# Patient Record
Sex: FEMALE | Race: WHITE | NOT HISPANIC OR LATINO | Employment: OTHER | URBAN - METROPOLITAN AREA
[De-identification: names, ages, dates, MRNs, and addresses within clinical notes are randomized per-mention and may not be internally consistent; named-entity substitution may affect disease eponyms.]

---

## 2017-06-01 ENCOUNTER — APPOINTMENT (OUTPATIENT)
Dept: ULTRASOUND IMAGING | Facility: HOSPITAL | Age: 67
End: 2017-06-01
Attending: INTERNAL MEDICINE

## 2017-06-05 ENCOUNTER — HOSPITAL ENCOUNTER (OUTPATIENT)
Dept: GENERAL RADIOLOGY | Facility: HOSPITAL | Age: 67
Discharge: HOME | End: 2017-06-05
Attending: ORTHOPAEDIC SURGERY | Admitting: ORTHOPAEDIC SURGERY

## 2017-06-05 DIAGNOSIS — M48.061 LUMBAR STENOSIS: ICD-10-CM

## 2017-06-05 PROCEDURE — 71020 HC CHEST PA AND LATERAL: CPT

## 2017-06-08 RX ORDER — CARVEDILOL 25 MG/1
25 TABLET ORAL
COMMUNITY

## 2017-06-08 RX ORDER — AMLODIPINE BESYLATE AND BENAZEPRIL HYDROCHLORIDE 10; 40 MG/1; MG/1
1 CAPSULE ORAL
COMMUNITY

## 2017-06-08 RX ORDER — HYDROCHLOROTHIAZIDE 25 MG/1
25 TABLET ORAL
COMMUNITY

## 2017-06-08 RX ORDER — HYDROCODONE BITARTRATE AND ACETAMINOPHEN 5; 325 MG/1; MG/1
1 TABLET ORAL
COMMUNITY
End: 2017-12-14 | Stop reason: SDUPTHER

## 2017-06-08 RX ORDER — GLIMEPIRIDE 2 MG/1
2 TABLET ORAL
COMMUNITY

## 2017-06-08 RX ORDER — GARLIC 400 MG
1 TABLET ORAL
COMMUNITY

## 2017-06-08 RX ORDER — SIMVASTATIN 20 MG
20 TABLET ORAL
COMMUNITY

## 2017-06-09 ENCOUNTER — APPOINTMENT (OUTPATIENT)
Dept: GENERAL RADIOLOGY | Facility: HOSPITAL | Age: 67
End: 2017-06-09

## 2017-06-09 ENCOUNTER — ANESTHESIA EVENT (OUTPATIENT)
Dept: PERIOP | Facility: HOSPITAL | Age: 67
End: 2017-06-09

## 2017-06-09 ENCOUNTER — HOSPITAL ENCOUNTER (OUTPATIENT)
Facility: HOSPITAL | Age: 67
Setting detail: HOSPITAL OUTPATIENT SURGERY
Discharge: HOME | End: 2017-06-09
Attending: ORTHOPAEDIC SURGERY | Admitting: ORTHOPAEDIC SURGERY

## 2017-06-09 ENCOUNTER — ANESTHESIA (OUTPATIENT)
Dept: PERIOP | Facility: HOSPITAL | Age: 67
End: 2017-06-09

## 2017-06-09 VITALS
TEMPERATURE: 98.1 F | OXYGEN SATURATION: 92 % | HEART RATE: 64 BPM | SYSTOLIC BLOOD PRESSURE: 129 MMHG | BODY MASS INDEX: 29.27 KG/M2 | RESPIRATION RATE: 16 BRPM | HEIGHT: 67 IN | DIASTOLIC BLOOD PRESSURE: 76 MMHG | WEIGHT: 186.5 LBS

## 2017-06-09 DIAGNOSIS — M71.38 SYNOVIAL CYST OF LUMBAR FACET JOINT: ICD-10-CM

## 2017-06-09 LAB
ABO GROUP BLD: NORMAL
BLD GP AB SCN SERPL QL: NEGATIVE
GLUCOSE BLDC GLUCOMTR-MCNC: 140 MG/DL (ref 70–130)
RH BLD: POSITIVE

## 2017-06-09 PROCEDURE — 25010000002 ONDANSETRON PER 1 MG: Performed by: ANESTHESIOLOGY

## 2017-06-09 PROCEDURE — 25010000002 MIDAZOLAM PER 1 MG: Performed by: ANESTHESIOLOGY

## 2017-06-09 PROCEDURE — 25010000003 CEFAZOLIN IN DEXTROSE 2-4 GM/100ML-% SOLUTION: Performed by: ORTHOPAEDIC SURGERY

## 2017-06-09 PROCEDURE — 25010000002 FENTANYL CITRATE (PF) 100 MCG/2ML SOLUTION: Performed by: NURSE ANESTHETIST, CERTIFIED REGISTERED

## 2017-06-09 PROCEDURE — 25010000002 HYDROMORPHONE PER 4 MG: Performed by: NURSE ANESTHETIST, CERTIFIED REGISTERED

## 2017-06-09 PROCEDURE — 82962 GLUCOSE BLOOD TEST: CPT

## 2017-06-09 PROCEDURE — 88304 TISSUE EXAM BY PATHOLOGIST: CPT | Performed by: ORTHOPAEDIC SURGERY

## 2017-06-09 PROCEDURE — 25010000002 DEXAMETHASONE PER 1 MG: Performed by: NURSE ANESTHETIST, CERTIFIED REGISTERED

## 2017-06-09 PROCEDURE — 25010000002 FENTANYL CITRATE (PF) 100 MCG/2ML SOLUTION: Performed by: ANESTHESIOLOGY

## 2017-06-09 PROCEDURE — 25010000002 NEOSTIGMINE PER 0.5 MG: Performed by: NURSE ANESTHETIST, CERTIFIED REGISTERED

## 2017-06-09 PROCEDURE — 86901 BLOOD TYPING SEROLOGIC RH(D): CPT | Performed by: ORTHOPAEDIC SURGERY

## 2017-06-09 PROCEDURE — 25010000002 PROPOFOL 10 MG/ML EMULSION: Performed by: NURSE ANESTHETIST, CERTIFIED REGISTERED

## 2017-06-09 PROCEDURE — 25010000002 ONDANSETRON PER 1 MG: Performed by: NURSE ANESTHETIST, CERTIFIED REGISTERED

## 2017-06-09 PROCEDURE — 86900 BLOOD TYPING SEROLOGIC ABO: CPT | Performed by: ORTHOPAEDIC SURGERY

## 2017-06-09 PROCEDURE — 76000 FLUOROSCOPY <1 HR PHYS/QHP: CPT

## 2017-06-09 PROCEDURE — 86850 RBC ANTIBODY SCREEN: CPT | Performed by: ORTHOPAEDIC SURGERY

## 2017-06-09 PROCEDURE — 72020 X-RAY EXAM OF SPINE 1 VIEW: CPT

## 2017-06-09 RX ORDER — OXYCODONE AND ACETAMINOPHEN 7.5; 325 MG/1; MG/1
1 TABLET ORAL ONCE AS NEEDED
Status: DISCONTINUED | OUTPATIENT
Start: 2017-06-09 | End: 2017-06-09 | Stop reason: HOSPADM

## 2017-06-09 RX ORDER — ONDANSETRON 2 MG/ML
4 INJECTION INTRAMUSCULAR; INTRAVENOUS ONCE AS NEEDED
Status: DISCONTINUED | OUTPATIENT
Start: 2017-06-09 | End: 2017-06-09 | Stop reason: HOSPADM

## 2017-06-09 RX ORDER — ONDANSETRON 2 MG/ML
4 INJECTION INTRAMUSCULAR; INTRAVENOUS ONCE AS NEEDED
Status: COMPLETED | OUTPATIENT
Start: 2017-06-09 | End: 2017-06-09

## 2017-06-09 RX ORDER — HYDROMORPHONE HYDROCHLORIDE 1 MG/ML
0.5 INJECTION, SOLUTION INTRAMUSCULAR; INTRAVENOUS; SUBCUTANEOUS
Status: DISCONTINUED | OUTPATIENT
Start: 2017-06-09 | End: 2017-06-09 | Stop reason: HOSPADM

## 2017-06-09 RX ORDER — FENTANYL CITRATE 50 UG/ML
50 INJECTION, SOLUTION INTRAMUSCULAR; INTRAVENOUS
Status: DISCONTINUED | OUTPATIENT
Start: 2017-06-09 | End: 2017-06-09 | Stop reason: HOSPADM

## 2017-06-09 RX ORDER — DIPHENHYDRAMINE HYDROCHLORIDE 50 MG/ML
12.5 INJECTION INTRAMUSCULAR; INTRAVENOUS
Status: DISCONTINUED | OUTPATIENT
Start: 2017-06-09 | End: 2017-06-09 | Stop reason: HOSPADM

## 2017-06-09 RX ORDER — LABETALOL HYDROCHLORIDE 5 MG/ML
5 INJECTION, SOLUTION INTRAVENOUS
Status: DISCONTINUED | OUTPATIENT
Start: 2017-06-09 | End: 2017-06-09 | Stop reason: HOSPADM

## 2017-06-09 RX ORDER — GLYCOPYRROLATE 0.2 MG/ML
INJECTION INTRAMUSCULAR; INTRAVENOUS AS NEEDED
Status: DISCONTINUED | OUTPATIENT
Start: 2017-06-09 | End: 2017-06-09 | Stop reason: SURG

## 2017-06-09 RX ORDER — EPHEDRINE SULFATE 50 MG/ML
INJECTION, SOLUTION INTRAVENOUS AS NEEDED
Status: DISCONTINUED | OUTPATIENT
Start: 2017-06-09 | End: 2017-06-09 | Stop reason: SURG

## 2017-06-09 RX ORDER — SODIUM CHLORIDE 0.9 % (FLUSH) 0.9 %
1-10 SYRINGE (ML) INJECTION AS NEEDED
Status: DISCONTINUED | OUTPATIENT
Start: 2017-06-09 | End: 2017-06-09 | Stop reason: HOSPADM

## 2017-06-09 RX ORDER — FLUMAZENIL 0.1 MG/ML
0.2 INJECTION INTRAVENOUS AS NEEDED
Status: DISCONTINUED | OUTPATIENT
Start: 2017-06-09 | End: 2017-06-09 | Stop reason: HOSPADM

## 2017-06-09 RX ORDER — ROCURONIUM BROMIDE 10 MG/ML
INJECTION, SOLUTION INTRAVENOUS AS NEEDED
Status: DISCONTINUED | OUTPATIENT
Start: 2017-06-09 | End: 2017-06-09 | Stop reason: SURG

## 2017-06-09 RX ORDER — SCOLOPAMINE TRANSDERMAL SYSTEM 1 MG/1
1 PATCH, EXTENDED RELEASE TRANSDERMAL ONCE
Status: DISCONTINUED | OUTPATIENT
Start: 2017-06-09 | End: 2017-06-09 | Stop reason: HOSPADM

## 2017-06-09 RX ORDER — SODIUM CHLORIDE, SODIUM LACTATE, POTASSIUM CHLORIDE, CALCIUM CHLORIDE 600; 310; 30; 20 MG/100ML; MG/100ML; MG/100ML; MG/100ML
9 INJECTION, SOLUTION INTRAVENOUS CONTINUOUS
Status: DISCONTINUED | OUTPATIENT
Start: 2017-06-09 | End: 2017-06-09 | Stop reason: HOSPADM

## 2017-06-09 RX ORDER — ONDANSETRON 2 MG/ML
INJECTION INTRAMUSCULAR; INTRAVENOUS AS NEEDED
Status: DISCONTINUED | OUTPATIENT
Start: 2017-06-09 | End: 2017-06-09 | Stop reason: SURG

## 2017-06-09 RX ORDER — NALOXONE HCL 0.4 MG/ML
0.2 VIAL (ML) INJECTION AS NEEDED
Status: DISCONTINUED | OUTPATIENT
Start: 2017-06-09 | End: 2017-06-09 | Stop reason: HOSPADM

## 2017-06-09 RX ORDER — CEFAZOLIN SODIUM 2 G/100ML
2 INJECTION, SOLUTION INTRAVENOUS ONCE
Status: COMPLETED | OUTPATIENT
Start: 2017-06-09 | End: 2017-06-09

## 2017-06-09 RX ORDER — PROMETHAZINE HYDROCHLORIDE 25 MG/1
25 TABLET ORAL ONCE AS NEEDED
Status: DISCONTINUED | OUTPATIENT
Start: 2017-06-09 | End: 2017-06-09 | Stop reason: HOSPADM

## 2017-06-09 RX ORDER — MIDAZOLAM HYDROCHLORIDE 1 MG/ML
1 INJECTION INTRAMUSCULAR; INTRAVENOUS
Status: DISCONTINUED | OUTPATIENT
Start: 2017-06-09 | End: 2017-06-09 | Stop reason: HOSPADM

## 2017-06-09 RX ORDER — PROMETHAZINE HYDROCHLORIDE 25 MG/ML
12.5 INJECTION, SOLUTION INTRAMUSCULAR; INTRAVENOUS ONCE AS NEEDED
Status: DISCONTINUED | OUTPATIENT
Start: 2017-06-09 | End: 2017-06-09 | Stop reason: HOSPADM

## 2017-06-09 RX ORDER — PROPOFOL 10 MG/ML
VIAL (ML) INTRAVENOUS AS NEEDED
Status: DISCONTINUED | OUTPATIENT
Start: 2017-06-09 | End: 2017-06-09 | Stop reason: SURG

## 2017-06-09 RX ORDER — HYDROCODONE BITARTRATE AND ACETAMINOPHEN 5; 325 MG/1; MG/1
1 TABLET ORAL EVERY 6 HOURS PRN
Qty: 40 TABLET | Refills: 0 | Status: SHIPPED | OUTPATIENT
Start: 2017-06-09

## 2017-06-09 RX ORDER — HYDRALAZINE HYDROCHLORIDE 20 MG/ML
5 INJECTION INTRAMUSCULAR; INTRAVENOUS
Status: DISCONTINUED | OUTPATIENT
Start: 2017-06-09 | End: 2017-06-09 | Stop reason: HOSPADM

## 2017-06-09 RX ORDER — BUPIVACAINE HYDROCHLORIDE AND EPINEPHRINE 5; 5 MG/ML; UG/ML
INJECTION, SOLUTION PERINEURAL AS NEEDED
Status: DISCONTINUED | OUTPATIENT
Start: 2017-06-09 | End: 2017-06-09 | Stop reason: HOSPADM

## 2017-06-09 RX ORDER — PROMETHAZINE HYDROCHLORIDE 25 MG/1
25 SUPPOSITORY RECTAL ONCE AS NEEDED
Status: DISCONTINUED | OUTPATIENT
Start: 2017-06-09 | End: 2017-06-09 | Stop reason: HOSPADM

## 2017-06-09 RX ORDER — MIDAZOLAM HYDROCHLORIDE 1 MG/ML
2 INJECTION INTRAMUSCULAR; INTRAVENOUS
Status: DISCONTINUED | OUTPATIENT
Start: 2017-06-09 | End: 2017-06-09 | Stop reason: HOSPADM

## 2017-06-09 RX ORDER — HYDROCODONE BITARTRATE AND ACETAMINOPHEN 7.5; 325 MG/1; MG/1
1 TABLET ORAL ONCE AS NEEDED
Status: COMPLETED | OUTPATIENT
Start: 2017-06-09 | End: 2017-06-09

## 2017-06-09 RX ORDER — DEXAMETHASONE SODIUM PHOSPHATE 10 MG/ML
INJECTION INTRAMUSCULAR; INTRAVENOUS AS NEEDED
Status: DISCONTINUED | OUTPATIENT
Start: 2017-06-09 | End: 2017-06-09 | Stop reason: SURG

## 2017-06-09 RX ORDER — EPHEDRINE SULFATE 50 MG/ML
5 INJECTION, SOLUTION INTRAVENOUS ONCE AS NEEDED
Status: DISCONTINUED | OUTPATIENT
Start: 2017-06-09 | End: 2017-06-09 | Stop reason: HOSPADM

## 2017-06-09 RX ORDER — FAMOTIDINE 10 MG/ML
20 INJECTION, SOLUTION INTRAVENOUS ONCE
Status: COMPLETED | OUTPATIENT
Start: 2017-06-09 | End: 2017-06-09

## 2017-06-09 RX ORDER — PROMETHAZINE HYDROCHLORIDE 25 MG/1
12.5 TABLET ORAL ONCE AS NEEDED
Status: DISCONTINUED | OUTPATIENT
Start: 2017-06-09 | End: 2017-06-09 | Stop reason: HOSPADM

## 2017-06-09 RX ADMIN — FENTANYL CITRATE 100 MCG: 50 INJECTION INTRAMUSCULAR; INTRAVENOUS at 08:56

## 2017-06-09 RX ADMIN — ONDANSETRON 4 MG: 2 INJECTION INTRAMUSCULAR; INTRAVENOUS at 10:26

## 2017-06-09 RX ADMIN — HYDROCODONE BITARTRATE AND ACETAMINOPHEN 1 TABLET: 7.5; 325 TABLET ORAL at 11:43

## 2017-06-09 RX ADMIN — DEXAMETHASONE SODIUM PHOSPHATE 6 MG: 10 INJECTION INTRAMUSCULAR; INTRAVENOUS at 09:20

## 2017-06-09 RX ADMIN — FENTANYL CITRATE 50 MCG: 50 INJECTION INTRAMUSCULAR; INTRAVENOUS at 11:27

## 2017-06-09 RX ADMIN — EPHEDRINE SULFATE 10 MG: 50 INJECTION INTRAMUSCULAR; INTRAVENOUS; SUBCUTANEOUS at 09:38

## 2017-06-09 RX ADMIN — EPHEDRINE SULFATE 10 MG: 50 INJECTION INTRAMUSCULAR; INTRAVENOUS; SUBCUTANEOUS at 09:22

## 2017-06-09 RX ADMIN — NEOSTIGMINE METHYLSULFATE 1 MG: 1 INJECTION INTRAMUSCULAR; INTRAVENOUS; SUBCUTANEOUS at 10:38

## 2017-06-09 RX ADMIN — MIDAZOLAM 1 MG: 1 INJECTION INTRAMUSCULAR; INTRAVENOUS at 08:41

## 2017-06-09 RX ADMIN — CEFAZOLIN SODIUM 2 G: 2 INJECTION, SOLUTION INTRAVENOUS at 09:05

## 2017-06-09 RX ADMIN — ROCURONIUM BROMIDE 40 MG: 10 INJECTION INTRAVENOUS at 08:56

## 2017-06-09 RX ADMIN — SODIUM CHLORIDE, POTASSIUM CHLORIDE, SODIUM LACTATE AND CALCIUM CHLORIDE: 600; 310; 30; 20 INJECTION, SOLUTION INTRAVENOUS at 09:40

## 2017-06-09 RX ADMIN — SCOPALAMINE 1 PATCH: 1 PATCH, EXTENDED RELEASE TRANSDERMAL at 08:41

## 2017-06-09 RX ADMIN — FENTANYL CITRATE 50 MCG: 50 INJECTION INTRAMUSCULAR; INTRAVENOUS at 11:15

## 2017-06-09 RX ADMIN — SODIUM CHLORIDE, POTASSIUM CHLORIDE, SODIUM LACTATE AND CALCIUM CHLORIDE 9 ML/HR: 600; 310; 30; 20 INJECTION, SOLUTION INTRAVENOUS at 08:41

## 2017-06-09 RX ADMIN — PROPOFOL 150 MG: 10 INJECTION, EMULSION INTRAVENOUS at 08:56

## 2017-06-09 RX ADMIN — ONDANSETRON 4 MG: 2 INJECTION INTRAMUSCULAR; INTRAVENOUS at 08:41

## 2017-06-09 RX ADMIN — HYDROMORPHONE HYDROCHLORIDE 0.5 MG: 1 INJECTION, SOLUTION INTRAMUSCULAR; INTRAVENOUS; SUBCUTANEOUS at 11:52

## 2017-06-09 RX ADMIN — EPHEDRINE SULFATE 10 MG: 50 INJECTION INTRAMUSCULAR; INTRAVENOUS; SUBCUTANEOUS at 09:27

## 2017-06-09 RX ADMIN — GLYCOPYRROLATE 0.4 MG: 0.2 INJECTION INTRAMUSCULAR; INTRAVENOUS at 10:26

## 2017-06-09 RX ADMIN — FAMOTIDINE 20 MG: 10 INJECTION, SOLUTION INTRAVENOUS at 08:41

## 2017-06-09 RX ADMIN — EPHEDRINE SULFATE 10 MG: 50 INJECTION INTRAMUSCULAR; INTRAVENOUS; SUBCUTANEOUS at 09:11

## 2017-06-09 RX ADMIN — SUGAMMADEX 200 MG: 100 INJECTION, SOLUTION INTRAVENOUS at 10:42

## 2017-06-09 RX ADMIN — NEOSTIGMINE METHYLSULFATE 3 MG: 1 INJECTION INTRAMUSCULAR; INTRAVENOUS; SUBCUTANEOUS at 10:26

## 2017-06-09 NOTE — OP NOTE
PREOPERATIVE DIAGNOSES:   1. Lumbar synovial facet cyst, right L4-L5.   2. Severe spinal stenosis.   3. Lumbar radiculopathy.   POSTOPERATIVE DIAGNOSES:   1. Lumbar synovial facet cyst, right  L4-L5.   2. Severe spinal stenosis.   3. Right lumbar radiculopathy.   PROCEDURES PERFORMED:   1. Microlaminectomy for excision of facet cyst, right  L4-L5.   2. Use of operative microscope for microsurgical technique and dissection.   SURGEON: Negrito Oconnell DO  ASSISTANT: Shannan Morel PA-C. Please note as part of the procedure I utilized the services of an assistant, specifically, MYLES Arreguin. Shannan Morel participated in crucial portions of the operation. The use of Shannan Morel greatly reduced overall operative time, thereby reducing overall morbidity for the patient.   ANESTHESIA: General.   ESTIMATED BLOOD LOSS: Less than 50 mL.  COMPLICATIONS: None apparent.   SPECIMENS: Cyst was sent to pathology for permanent section.   DISPOSITION: Patient to the recovery room in stable condition.   INDICATIONS: The patient is a 67 y.o. year-old who presented with severe right  leg pain and had imaging showing a large synovial facet cyst occupying the spinal canal at L4-L5. The patient had tried and failed conservative treatment. I recommended microlaminectomy for excision of the cyst. I explained the risks of the surgery including, but not limited to, bleeding, infection, risk of anesthesia, blood clots, pulmonary embolus, myocardial infarction, stroke, nerve root damage causing complete or partial paralysis, dural tear causing spinal headache, risk of recurrent cyst formation, risk of postlaminectomy syndrome, need for further surgery, lack of guarantee, continued pain, and continued numbness.  The patient had many question about these risks, all of which are answered to the best of my ability in a language which she could understand. Informed consent was obtained. The patient presents today for microlaminectomy for  excision of facet cyst.   DESCRIPTION OF PROCEDURE: After I identified the patient in the preoperative holding area, all labs and consent were found to be in order. TRAN hose and SCDs were applied for thromboembolic prophylaxis.  The patient's back was marked with an indelible marker and    was transferred to the operative suite. In the OR, the patient   was given the benefit of general anesthesia. The patient was carefully positioned prone on the Hieu table. All bony prominences were padded.  The patient's back was then prepped and draped in the usual sterile fashion. Then 2 g of Kefzol were given prior to incision. A timeout was performed. This was followed by needle localization of the L4-L5 interspace. I then marked this on the Ioban and then made a 1.5 cm incision with a #10 blade. I then dissected with the Bovie the fascia, which I incised in line with the incision. I then placed METRx dilators and placed a 5 x 18 mm METRx tube and affixed it to the table. I then placed a Barnes probe for localization at L4-L5 and took an x-ray to confirm. I then brought in the operating microscope for microsurgical technique and dissection. I used a curette to release the ligamentum flavum from the lamina and then used a Kerrison punch to excise the lamina and the ligamentum. As I did this in the midline spinal canal the cyst came into view. I then carefully developed a plane between the cyst capsule and the dura and then started to remove the cyst and sent this as specimen to pathology. The cyst was quite large measuring over 1 cm in cranial cephalad dimension. It was occupying the lateral recess causing severe stenosis. It looked atypical with a yellow appearance.  I was carefulley dissected of the dura. Once the nerve sheath was completely decompressed I assessed freedom of the nerves with a Rafa probe. I then irrigated, achieved hemostasis, removed the retractor and closed the wound in a layered fashion. Sterile  dressings were applied. The patient was awakened from their  general anesthesia, transferred to the hospital bed and taken to recovery in stable condition.   DISPOSITION: The patient will be discharged home when she meets criteria. She will be given discharge instructions and a followup appointment in 2 weeks.

## 2017-06-09 NOTE — PLAN OF CARE
Problem: Patient Care Overview (Adult)  Goal: Plan of Care Review  Outcome: Outcome(s) achieved Date Met:  06/09/17 06/09/17 1300   Coping/Psychosocial Response Interventions   Plan Of Care Reviewed With patient;spouse   Patient Care Overview   Progress improving       Goal: Adult Individualization and Mutuality  Outcome: Outcome(s) achieved Date Met:  06/09/17  Goal: Discharge Needs Assessment  Outcome: Outcome(s) achieved Date Met:  06/09/17    Problem: Perioperative Period (Adult)  Goal: Signs and Symptoms of Listed Potential Problems Will be Absent or Manageable (Perioperative Period)  Outcome: Outcome(s) achieved Date Met:  06/09/17

## 2017-06-09 NOTE — ANESTHESIA POSTPROCEDURE EVALUATION
Patient: Chrissy Gutierrez    Procedure Summary     Date Anesthesia Start Anesthesia Stop Room / Location    06/09/17 0852 1051  [facility] /  [facility]       Procedure Diagnosis Surgeon Provider    1 LEVEL LAMINECTOMY DECOMPRESSION L4 5 EXCISION FACET CYST (N/A Spine Lumbar) No diagnosis on file. DO Clay Zamora MD          Anesthesia Type: general  Last vitals  /62 (06/09/17 1200)    Temp      Pulse 89 (06/09/17 1200)   Resp 16 (06/09/17 1200)    SpO2 93 % (06/09/17 1200)      Post Anesthesia Care and Evaluation    Patient location during evaluation: bedside  Patient participation: complete - patient participated  Level of consciousness: awake  Pain management: adequate  Airway patency: patent  Anesthetic complications: No anesthetic complications    Cardiovascular status: acceptable  Respiratory status: acceptable  Hydration status: acceptable    Comments:

## 2017-06-09 NOTE — ANESTHESIA PROCEDURE NOTES
Airway  Urgency: elective    Date/Time: 6/9/2017 8:59 AM  Airway not difficult    General Information and Staff    Patient location during procedure: OR  Anesthesiologist: TASHI LEGGETT  CRNA: VINCENT SHELDON    Indications and Patient Condition  Indications for airway management: airway protection    Preoxygenated: yes  Mask difficulty assessment: 1 - vent by mask    Final Airway Details  Final airway type: endotracheal airway      Successful airway: ETT  Cuffed: yes   Successful intubation technique: direct laryngoscopy  Endotracheal tube insertion site: oral  Blade: Livan  Blade size: #3  ETT size: 7.0 mm  Cormack-Lehane Classification: grade I - full view of glottis  Placement verified by: chest auscultation and capnometry   Cuff volume (mL): 5  Measured from: lips  ETT to lips (cm): 20  Number of attempts at approach: 1    Additional Comments  Smooth IV induction. Trachea intubated. Cuff up. Ett secured. BEBS. Dentition intact without injury.

## 2017-06-09 NOTE — DISCHARGE INSTRUCTIONS
*****You had Norco for pain at 11:53 am*****      SEDATION DISCHARGE INSTRUCTIONS.    IMPORTANT: The following information will help you return to your best level of health.  GENERAL ANESTHESIA.  You have had general anesthesia. You were given a medicine to help you go to sleep and not feel pain.    Follow these instructions:   Go right home. Rest quietly at home today, then you can be up and about.   Do not drink alcohol, drive or operate machinery for 24 hours.   Do not make any important decisions or sign any legal papers in the next 24 hours.   Have a RESPONSIBLE PERSON stay with you the rest of today and overnight for your protection and safety.   Start your diet with fluids and light foods (jello, soup, juice, toast). Then eat a normal diet if not nauseated.    Call your doctor if you have:   any blue or gray skin color.   repeated vomiting.   trouble breathing.   any new problems or concerns.

## 2017-06-09 NOTE — PERIOPERATIVE NURSING NOTE
"Patient reports difficulty ambulating and uses a walker for assistance. Patient states \"I have pain in lower back and its tightness across my buttocks area.\" Patient also, reported numbness down her right leg and foot. Patients push/ pullsreflex was strong and equal bilaterally as well as her hand  were strong and equal  "

## 2017-06-09 NOTE — BRIEF OP NOTE
LUMBAR DISCECTOMY POSTERIOR WITH METRIX  Procedure Note    Chrissy LAM Matt  6/9/2017    Pre-op Diagnosis:   Severe lumbar stenosis secondary to lumbar synovial facet cyst L4-5    Post-op Diagnosis:     Same    Procedure/CPT® Codes:      Procedure(s):  1 LEVEL LAMINECTOMY DECOMPRESSION L4 5 EXCISION FACET CYST    Surgeon(s):  Negrito Oconnell DO    Anesthesia: General    Staff:   Circulator: Fartun Rolle RN  Radiology Technologist: Venecia Coombs  Scrub Person: Jai Tinoco  Assistant: MYLES Vanessa    Estimated Blood Loss: *No blood loss documented*  Urine Voided: * No values recorded between 6/9/2017  8:52 AM and 6/9/2017 10:15 AM *    Specimens:                  ID Type Source Tests Collected by Time Destination   A : L4-5  FACET CYST Tissue Spine, Lumbar TISSUE EXAM Negrito Oconnell DO 6/9/2017 0939          Drains:           Findings: Severe spinal stenosis  Large yellow synovial facet cyst?    Complications: None apparent        Negrito Oconnell DO     Date: 6/9/2017  Time: 10:15 AM

## 2017-06-09 NOTE — PLAN OF CARE
"Problem: Patient Care Overview (Adult)  Goal: Plan of Care Review  Outcome: Ongoing (interventions implemented as appropriate)    06/09/17 0854   Coping/Psychosocial Response Interventions   Plan Of Care Reviewed With patient   Patient Care Overview   Progress progress toward functional goals as expected       Goal: Adult Individualization and Mutuality  Outcome: Ongoing (interventions implemented as appropriate)    06/09/17 0854   Individualization   Patient Specific Preferences PT PREFERS TO BE CALLED \"EN\"    Patient Specific Goals TO BE RELAXED FOR SX TODAY   Patient Specific Interventions TO GIVE RELAXING MED PER AA ORDER   Mutuality/Individual Preferences   What Anxieties, Fears or Concerns Do You Have About Your Health or Care? NONE    What Questions Do You Have About Your Health or Care? NONE    What Information Would Help Us Give You More Personalized Care? SEEE ABOVE       Goal: Discharge Needs Assessment  Outcome: Ongoing (interventions implemented as appropriate)    Problem: Perioperative Period (Adult)  Goal: Signs and Symptoms of Listed Potential Problems Will be Absent or Manageable (Perioperative Period)  Outcome: Ongoing (interventions implemented as appropriate)    06/09/17 0854   Perioperative Period   Problems Assessed (Perioperative Period) all   Problems Present (Perioperative Period) pain           "

## 2017-06-09 NOTE — ANESTHESIA PREPROCEDURE EVALUATION
Anesthesia Evaluation     Patient summary reviewed and Nursing notes reviewed   history of anesthetic complications: PONV  NPO Solid Status: > 8 hours  NPO Liquid Status: > 2 hours     Airway   Mallampati: II  TM distance: >3 FB  Neck ROM: full  no difficulty expected  Dental - normal exam     Pulmonary - negative pulmonary ROS and normal exam    breath sounds clear to auscultation  (-) rhonchi, decreased breath sounds, wheezes, rales, stridor  Cardiovascular - normal exam    Rhythm: regular  Rate: normal    (+) hypertension well controlled,   (-) murmur, weak pulses, friction rub, systolic click, carotid bruits, JVD, peripheral edema      Neuro/Psych- negative ROS  GI/Hepatic/Renal/Endo    (+)  diabetes mellitus type 2 well controlled,     Musculoskeletal (-) negative ROS    Abdominal  - normal exam    Abdomen: soft.   Substance History - negative use     OB/GYN negative ob/gyn ROS         Other - negative ROS                                       Anesthesia Plan    ASA 3     general     intravenous induction   Anesthetic plan and risks discussed with patient.

## 2017-06-10 LAB
CYTO UR: NORMAL
LAB AP CASE REPORT: NORMAL
Lab: NORMAL
PATH REPORT.FINAL DX SPEC: NORMAL
PATH REPORT.GROSS SPEC: NORMAL

## 2017-12-04 ENCOUNTER — TRANSCRIBE ORDERS (OUTPATIENT)
Dept: ADMINISTRATIVE | Facility: HOSPITAL | Age: 67
End: 2017-12-04

## 2017-12-04 DIAGNOSIS — M54.5 LOW BACK PAIN, UNSPECIFIED BACK PAIN LATERALITY, UNSPECIFIED CHRONICITY, WITH SCIATICA PRESENCE UNSPECIFIED: Primary | ICD-10-CM

## 2017-12-14 ENCOUNTER — HOSPITAL ENCOUNTER (OUTPATIENT)
Dept: PAIN MEDICINE | Facility: HOSPITAL | Age: 67
Discharge: HOME | End: 2017-12-14
Attending: ORTHOPAEDIC SURGERY | Admitting: ORTHOPAEDIC SURGERY

## 2017-12-14 ENCOUNTER — ANESTHESIA (OUTPATIENT)
Dept: PAIN MEDICINE | Facility: HOSPITAL | Age: 67
End: 2017-12-14

## 2017-12-14 ENCOUNTER — TRANSCRIBE ORDERS (OUTPATIENT)
Dept: PAIN MEDICINE | Facility: HOSPITAL | Age: 67
End: 2017-12-14

## 2017-12-14 ENCOUNTER — HOSPITAL ENCOUNTER (OUTPATIENT)
Dept: GENERAL RADIOLOGY | Facility: HOSPITAL | Age: 67
Discharge: HOME | End: 2017-12-14

## 2017-12-14 ENCOUNTER — ANESTHESIA EVENT (OUTPATIENT)
Dept: PAIN MEDICINE | Facility: HOSPITAL | Age: 67
End: 2017-12-14

## 2017-12-14 VITALS
OXYGEN SATURATION: 98 % | DIASTOLIC BLOOD PRESSURE: 88 MMHG | SYSTOLIC BLOOD PRESSURE: 134 MMHG | RESPIRATION RATE: 16 BRPM | HEIGHT: 67 IN | WEIGHT: 200 LBS | BODY MASS INDEX: 31.39 KG/M2 | HEART RATE: 88 BPM | TEMPERATURE: 97.9 F

## 2017-12-14 DIAGNOSIS — R52 PAIN: ICD-10-CM

## 2017-12-14 DIAGNOSIS — M54.5 LOW BACK PAIN, UNSPECIFIED BACK PAIN LATERALITY, UNSPECIFIED CHRONICITY, WITH SCIATICA PRESENCE UNSPECIFIED: ICD-10-CM

## 2017-12-14 DIAGNOSIS — M54.5 LOW BACK PAIN, UNSPECIFIED BACK PAIN LATERALITY, UNSPECIFIED CHRONICITY, WITH SCIATICA PRESENCE UNSPECIFIED: Primary | ICD-10-CM

## 2017-12-14 LAB — GLUCOSE BLDC GLUCOMTR-MCNC: 115 MG/DL (ref 70–130)

## 2017-12-14 PROCEDURE — 25010000002 METHYLPREDNISOLONE PER 80 MG: Performed by: ANESTHESIOLOGY

## 2017-12-14 PROCEDURE — 82962 GLUCOSE BLOOD TEST: CPT

## 2017-12-14 PROCEDURE — 77003 FLUOROGUIDE FOR SPINE INJECT: CPT

## 2017-12-14 PROCEDURE — C1755 CATHETER, INTRASPINAL: HCPCS

## 2017-12-14 RX ORDER — MIDAZOLAM HYDROCHLORIDE 1 MG/ML
1 INJECTION INTRAMUSCULAR; INTRAVENOUS
Status: DISCONTINUED | OUTPATIENT
Start: 2017-12-14 | End: 2017-12-15 | Stop reason: HOSPADM

## 2017-12-14 RX ORDER — SODIUM CHLORIDE 0.9 % (FLUSH) 0.9 %
1-10 SYRINGE (ML) INJECTION AS NEEDED
Status: DISCONTINUED | OUTPATIENT
Start: 2017-12-14 | End: 2017-12-15 | Stop reason: HOSPADM

## 2017-12-14 RX ORDER — LIDOCAINE HYDROCHLORIDE 10 MG/ML
1 INJECTION, SOLUTION INFILTRATION; PERINEURAL ONCE
Status: DISCONTINUED | OUTPATIENT
Start: 2017-12-14 | End: 2017-12-15 | Stop reason: HOSPADM

## 2017-12-14 RX ORDER — ACETAMINOPHEN 500 MG
500 TABLET ORAL EVERY 6 HOURS PRN
COMMUNITY

## 2017-12-14 RX ORDER — FENTANYL CITRATE 50 UG/ML
50 INJECTION, SOLUTION INTRAMUSCULAR; INTRAVENOUS
Status: DISCONTINUED | OUTPATIENT
Start: 2017-12-14 | End: 2017-12-15 | Stop reason: HOSPADM

## 2017-12-14 RX ORDER — METHYLPREDNISOLONE ACETATE 80 MG/ML
80 INJECTION, SUSPENSION INTRA-ARTICULAR; INTRALESIONAL; INTRAMUSCULAR; SOFT TISSUE ONCE
Status: COMPLETED | OUTPATIENT
Start: 2017-12-14 | End: 2017-12-14

## 2017-12-14 RX ADMIN — METHYLPREDNISOLONE ACETATE 80 MG: 80 INJECTION, SUSPENSION INTRA-ARTICULAR; INTRALESIONAL; INTRAMUSCULAR; SOFT TISSUE at 14:12

## 2017-12-14 NOTE — ANESTHESIA PROCEDURE NOTES
PAIN Epidural block    Patient location during procedure: pain clinic  Indication:procedure for pain  Performed By  Anesthesiologist: DESI CROWDER  Preanesthetic Checklist  Completed: patient identified, site marked, surgical consent, pre-op evaluation, timeout performed, risks and benefits discussed and monitors and equipment checked  Additional Notes  Depomedrol - 80mg    Needle position confirmed by fluoroscopy. No contrast due to renal function.    Diagnosis  Post-Op Diagnosis Codes:     * Lumbar degenerative disc disease (M51.36)     * Lumbar radiculopathy (M54.16)    Prep:  Pt Position:prone  Sterile Tech:cap, gloves, mask and sterile barrier  Prep:chlorhexidine gluconate and isopropyl alcohol  Monitoring:blood pressure monitoring, continuous pulse oximetry and EKG  Procedure:  Sedation: no   Approach:right paramedian  Guidance: fluoroscopy  Location:lumbar  Level:3-4  Needle Type:Tuohy  Needle Gauge:20  Aspiration:negative  Medications:  Depomedrol:80 mg  Preservative Free Saline:2mL  Isovue:0mL    Post Assessment:  Post-procedure: bandaide.  Pt Tolerance:patient tolerated the procedure well with no apparent complications  Complications:no

## 2017-12-14 NOTE — H&P
[facility]    History and Physical    Patient Name: Chrissy Gutierrez  :  1950  MRN:  [id number]  Date of Admission: 2017    Subjective     Patient is a 67 y.o. female presents with chief complaint of chronic, intermitent, moderate low back and leg: bilateral pain.  Onset of symptoms was gradual starting several months ago.  Symptoms are associated/aggravated by activity, exercise or twisting. Symptoms improve with rest    The following portions of the patients history were reviewed and updated as appropriate: current medications, allergies, past medical history, past surgical history, past family history, past social history and problem list                Objective     Past Medical History:   Past Medical History:   Diagnosis Date   • Cancer    • Hypertension      Past Surgical History:   Past Surgical History:   Procedure Laterality Date   • KIDNEY SURGERY Right 2017    REMOVED RIGHT KIDNEY   • LUMBAR DISCECTOMY FUSION INSTRUMENTATION N/A 2017    Procedure: 1 LEVEL LAMINECTOMY DECOMPRESSION L4 5 EXCISION FACET CYST;  Surgeon: Negrito Oconnell DO;  Location: [facility];  Service:    • WISDOM TOOTH EXTRACTION       Family History: No family history on file.  Social History:   Social History   Substance Use Topics   • Smoking status: Never Smoker   • Smokeless tobacco: None   • Alcohol use Yes      Comment: socially       Vital Signs Range for the last 24 hours  Temperature:     Temp Source:     BP:     Pulse:     Respirations:     SPO2:     O2 Amount (l/min):     O2 Devices     Weight:           --------------------------------------------------------------------------------    Current Outpatient Prescriptions   Medication Sig Dispense Refill   • acetaminophen (TYLENOL) 500 MG tablet Take 500 mg by mouth Every 6 (Six) Hours As Needed for Mild Pain .     • amLODIPine-benazepril (LOTREL) 10-40 MG per capsule Take 1 capsule by mouth.     • carvedilol (COREG) 25 MG tablet Take 25 mg by mouth.      • Garlic 400 MG tablet delayed-release Take 1 tablet by mouth. On hold for sx.     • glimepiride (AMARYL) 2 MG tablet Take 2 mg by mouth.     • hydrochlorothiazide (HYDRODIURIL) 25 MG tablet Take 25 mg by mouth.     • metFORMIN (GLUCOPHAGE) 500 MG tablet Take 500 mg by mouth 2 (Two) Times a Day With Meals.     • Misc Natural Products (OSTEO BI-FLEX ADV DOUBLE ST PO) Take 2 tablets by mouth Daily.     • Multiple Vitamins-Minerals (MULTIVITAMIN ADULT PO) Take 1 tablet by mouth Daily.     • NIACIN PO Take 1 tablet by mouth Daily.     • Omega-3 Fatty Acids (FISH OIL CONCENTRATE PO) Take 2,000 mg by mouth Daily.     • simvastatin (ZOCOR) 20 MG tablet Take 20 mg by mouth.     • HYDROcodone-acetaminophen (NORCO) 5-325 MG per tablet Take 1 tablet by mouth Every 6 (Six) Hours As Needed (Pain). 40 tablet 0     No current facility-administered medications for this encounter.        --------------------------------------------------------------------------------  Assessment/Plan      Anesthesia Evaluation     Patient summary reviewed and Nursing notes reviewed          Airway   Mallampati: II  TM distance: >3 FB  Neck ROM: full  Dental - normal exam     Pulmonary - negative pulmonary ROS and normal exam   Cardiovascular - normal exam    (+) hypertension,       Neuro/Psych- negative ROS and neuro exam normal  GI/Hepatic/Renal/Endo - negative ROS     Musculoskeletal (-) normal exam    Abdominal  - normal exam   Substance History - negative use     OB/GYN negative ob/gyn ROS         Other   (+) arthritis                                Diagnosis and Plan    Treatment Plan  ASA 2      Procedures: Lumbar Epidural Steroid Injection(LESI), With fluoroscopy,       Anesthetic plan and risks discussed with patient.          Diagnosis     * Lumbar degenerative disc disease [M51.36]     * Lumbar radiculopathy [M54.16]

## 2018-01-11 ENCOUNTER — ANESTHESIA (OUTPATIENT)
Dept: PAIN MEDICINE | Facility: HOSPITAL | Age: 68
End: 2018-01-11

## 2018-01-11 ENCOUNTER — TRANSCRIBE ORDERS (OUTPATIENT)
Dept: PAIN MEDICINE | Facility: HOSPITAL | Age: 68
End: 2018-01-11

## 2018-01-11 ENCOUNTER — ANESTHESIA EVENT (OUTPATIENT)
Dept: PAIN MEDICINE | Facility: HOSPITAL | Age: 68
End: 2018-01-11

## 2018-01-11 ENCOUNTER — HOSPITAL ENCOUNTER (OUTPATIENT)
Dept: PAIN MEDICINE | Facility: HOSPITAL | Age: 68
Discharge: HOME | End: 2018-01-11
Attending: ORTHOPAEDIC SURGERY | Admitting: ORTHOPAEDIC SURGERY

## 2018-01-11 ENCOUNTER — HOSPITAL ENCOUNTER (OUTPATIENT)
Dept: GENERAL RADIOLOGY | Facility: HOSPITAL | Age: 68
Discharge: HOME | End: 2018-01-11

## 2018-01-11 VITALS
HEART RATE: 93 BPM | DIASTOLIC BLOOD PRESSURE: 80 MMHG | SYSTOLIC BLOOD PRESSURE: 131 MMHG | OXYGEN SATURATION: 97 % | HEIGHT: 67 IN | BODY MASS INDEX: 31.39 KG/M2 | WEIGHT: 200 LBS | TEMPERATURE: 98.4 F | RESPIRATION RATE: 16 BRPM

## 2018-01-11 DIAGNOSIS — M54.5 LOW BACK PAIN, UNSPECIFIED BACK PAIN LATERALITY, UNSPECIFIED CHRONICITY, WITH SCIATICA PRESENCE UNSPECIFIED: ICD-10-CM

## 2018-01-11 DIAGNOSIS — R52 PAIN: ICD-10-CM

## 2018-01-11 DIAGNOSIS — M54.5 LOW BACK PAIN, UNSPECIFIED BACK PAIN LATERALITY, UNSPECIFIED CHRONICITY, WITH SCIATICA PRESENCE UNSPECIFIED: Primary | ICD-10-CM

## 2018-01-11 PROCEDURE — 77003 FLUOROGUIDE FOR SPINE INJECT: CPT

## 2018-01-11 PROCEDURE — C1755 CATHETER, INTRASPINAL: HCPCS

## 2018-01-11 PROCEDURE — 25010000002 METHYLPREDNISOLONE PER 80 MG: Performed by: ANESTHESIOLOGY

## 2018-01-11 RX ORDER — MIDAZOLAM HYDROCHLORIDE 1 MG/ML
1 INJECTION INTRAMUSCULAR; INTRAVENOUS AS NEEDED
Status: DISCONTINUED | OUTPATIENT
Start: 2018-01-11 | End: 2018-01-12 | Stop reason: HOSPADM

## 2018-01-11 RX ORDER — SODIUM CHLORIDE 0.9 % (FLUSH) 0.9 %
1-10 SYRINGE (ML) INJECTION AS NEEDED
Status: DISCONTINUED | OUTPATIENT
Start: 2018-01-11 | End: 2018-01-12 | Stop reason: HOSPADM

## 2018-01-11 RX ORDER — LIDOCAINE HYDROCHLORIDE 10 MG/ML
1 INJECTION, SOLUTION INFILTRATION; PERINEURAL ONCE AS NEEDED
Status: DISCONTINUED | OUTPATIENT
Start: 2018-01-11 | End: 2018-01-12 | Stop reason: HOSPADM

## 2018-01-11 RX ORDER — FENTANYL CITRATE 50 UG/ML
50 INJECTION, SOLUTION INTRAMUSCULAR; INTRAVENOUS AS NEEDED
Status: DISCONTINUED | OUTPATIENT
Start: 2018-01-11 | End: 2018-01-12 | Stop reason: HOSPADM

## 2018-01-11 RX ORDER — METHYLPREDNISOLONE ACETATE 80 MG/ML
80 INJECTION, SUSPENSION INTRA-ARTICULAR; INTRALESIONAL; INTRAMUSCULAR; SOFT TISSUE ONCE
Status: COMPLETED | OUTPATIENT
Start: 2018-01-11 | End: 2018-01-11

## 2018-01-11 RX ADMIN — METHYLPREDNISOLONE ACETATE 80 MG: 80 INJECTION, SUSPENSION INTRA-ARTICULAR; INTRALESIONAL; INTRAMUSCULAR; SOFT TISSUE at 13:23

## 2018-01-11 NOTE — INTERVAL H&P NOTE
[facility]  H&P reviewed. No changes since last visit.  Patient states   25-50% improvement since the last procedure/injection.  Relief lasted about 2 weeks.  Pain today 6/10.      Diagnosis     * Lumbar radiculopathy [M54.16]     * Degeneration of lumbar intervertebral disc [M51.36]      Airway assessed since last visit. Airway class equals: 2.    Plan:  1. Epidural with fluoroscopy +/- epidurogram (if needed)  2. Physical therapy exercises at home as prescribed by physical therapy or from the pain clinic handout (given to the patient). Continuation of these exercises every day, or multiple times per week, even when the patient has good pain relief, was stressed to the patient as a preventative measure to decrease the frequency and severity of future pain episodes.  3. Continue pain medicines as already prescribed. If patient not currently taking any, it is recommended to begin Acetaminophen 1000 mg po q 8 hours. If other medicines containing Acetaminophen are currently prescribed, maintain daily dose at 3000mg.   4. If they can tolerate NSAIDS, it is recommended to take Ibuprofen 600 mg po q 6 hours for 7 days during pain exacerbations. Alternatively, they may substitute an NSAID of their choice (e.g. Aleve)  5. Heat and ice to the affected area as tolerated for pain control. It was discussed that heating pads can cause burns.  6. Low impact exercise such as walking or water exercise was recommended to maintain overall health and aid in weight control.   7. Follow up as needed for subsequent injections.  8. Patient was counseled to abstain from tobacco products.

## 2018-01-11 NOTE — H&P (VIEW-ONLY)
[facility]    History and Physical    Patient Name: Chrissy Gutierrez  :  1950  MRN:  [id number]  Date of Admission: 2017    Subjective     Patient is a 67 y.o. female presents with chief complaint of chronic, intermitent, moderate low back and leg: bilateral pain.  Onset of symptoms was gradual starting several months ago.  Symptoms are associated/aggravated by activity, exercise or twisting. Symptoms improve with rest    The following portions of the patients history were reviewed and updated as appropriate: current medications, allergies, past medical history, past surgical history, past family history, past social history and problem list                Objective     Past Medical History:   Past Medical History:   Diagnosis Date   • Cancer    • Hypertension      Past Surgical History:   Past Surgical History:   Procedure Laterality Date   • KIDNEY SURGERY Right 2017    REMOVED RIGHT KIDNEY   • LUMBAR DISCECTOMY FUSION INSTRUMENTATION N/A 2017    Procedure: 1 LEVEL LAMINECTOMY DECOMPRESSION L4 5 EXCISION FACET CYST;  Surgeon: Negrito Oconnell DO;  Location: [facility];  Service:    • WISDOM TOOTH EXTRACTION       Family History: No family history on file.  Social History:   Social History   Substance Use Topics   • Smoking status: Never Smoker   • Smokeless tobacco: None   • Alcohol use Yes      Comment: socially       Vital Signs Range for the last 24 hours  Temperature:     Temp Source:     BP:     Pulse:     Respirations:     SPO2:     O2 Amount (l/min):     O2 Devices     Weight:           --------------------------------------------------------------------------------    Current Outpatient Prescriptions   Medication Sig Dispense Refill   • acetaminophen (TYLENOL) 500 MG tablet Take 500 mg by mouth Every 6 (Six) Hours As Needed for Mild Pain .     • amLODIPine-benazepril (LOTREL) 10-40 MG per capsule Take 1 capsule by mouth.     • carvedilol (COREG) 25 MG tablet Take 25 mg by mouth.      • Garlic 400 MG tablet delayed-release Take 1 tablet by mouth. On hold for sx.     • glimepiride (AMARYL) 2 MG tablet Take 2 mg by mouth.     • hydrochlorothiazide (HYDRODIURIL) 25 MG tablet Take 25 mg by mouth.     • metFORMIN (GLUCOPHAGE) 500 MG tablet Take 500 mg by mouth 2 (Two) Times a Day With Meals.     • Misc Natural Products (OSTEO BI-FLEX ADV DOUBLE ST PO) Take 2 tablets by mouth Daily.     • Multiple Vitamins-Minerals (MULTIVITAMIN ADULT PO) Take 1 tablet by mouth Daily.     • NIACIN PO Take 1 tablet by mouth Daily.     • Omega-3 Fatty Acids (FISH OIL CONCENTRATE PO) Take 2,000 mg by mouth Daily.     • simvastatin (ZOCOR) 20 MG tablet Take 20 mg by mouth.     • HYDROcodone-acetaminophen (NORCO) 5-325 MG per tablet Take 1 tablet by mouth Every 6 (Six) Hours As Needed (Pain). 40 tablet 0     No current facility-administered medications for this encounter.        --------------------------------------------------------------------------------  Assessment/Plan      Anesthesia Evaluation     Patient summary reviewed and Nursing notes reviewed          Airway   Mallampati: II  TM distance: >3 FB  Neck ROM: full  Dental - normal exam     Pulmonary - negative pulmonary ROS and normal exam   Cardiovascular - normal exam    (+) hypertension,       Neuro/Psych- negative ROS and neuro exam normal  GI/Hepatic/Renal/Endo - negative ROS     Musculoskeletal (-) normal exam    Abdominal  - normal exam   Substance History - negative use     OB/GYN negative ob/gyn ROS         Other   (+) arthritis                                Diagnosis and Plan    Treatment Plan  ASA 2      Procedures: Lumbar Epidural Steroid Injection(LESI), With fluoroscopy,       Anesthetic plan and risks discussed with patient.          Diagnosis     * Lumbar degenerative disc disease [M51.36]     * Lumbar radiculopathy [M54.16]

## 2018-02-22 ENCOUNTER — ANESTHESIA (OUTPATIENT)
Dept: PAIN MEDICINE | Facility: HOSPITAL | Age: 68
End: 2018-02-22

## 2018-02-22 ENCOUNTER — ANESTHESIA EVENT (OUTPATIENT)
Dept: PAIN MEDICINE | Facility: HOSPITAL | Age: 68
End: 2018-02-22

## 2018-02-22 ENCOUNTER — HOSPITAL ENCOUNTER (OUTPATIENT)
Dept: PAIN MEDICINE | Facility: HOSPITAL | Age: 68
Discharge: HOME | End: 2018-02-22
Attending: ORTHOPAEDIC SURGERY | Admitting: ORTHOPAEDIC SURGERY

## 2018-02-22 ENCOUNTER — HOSPITAL ENCOUNTER (OUTPATIENT)
Dept: GENERAL RADIOLOGY | Facility: HOSPITAL | Age: 68
Discharge: HOME | End: 2018-02-22

## 2018-02-22 VITALS
OXYGEN SATURATION: 96 % | DIASTOLIC BLOOD PRESSURE: 69 MMHG | SYSTOLIC BLOOD PRESSURE: 112 MMHG | RESPIRATION RATE: 16 BRPM | HEART RATE: 81 BPM

## 2018-02-22 DIAGNOSIS — M54.5 LOW BACK PAIN, UNSPECIFIED BACK PAIN LATERALITY, UNSPECIFIED CHRONICITY, WITH SCIATICA PRESENCE UNSPECIFIED: ICD-10-CM

## 2018-02-22 DIAGNOSIS — R52 PAIN: ICD-10-CM

## 2018-02-22 PROCEDURE — 25010000002 METHYLPREDNISOLONE PER 80 MG: Performed by: ANESTHESIOLOGY

## 2018-02-22 PROCEDURE — 77003 FLUOROGUIDE FOR SPINE INJECT: CPT

## 2018-02-22 PROCEDURE — C1755 CATHETER, INTRASPINAL: HCPCS

## 2018-02-22 RX ORDER — METHYLPREDNISOLONE ACETATE 80 MG/ML
80 INJECTION, SUSPENSION INTRA-ARTICULAR; INTRALESIONAL; INTRAMUSCULAR; SOFT TISSUE ONCE
Status: COMPLETED | OUTPATIENT
Start: 2018-02-22 | End: 2018-02-22

## 2018-02-22 RX ORDER — LIDOCAINE HYDROCHLORIDE 10 MG/ML
1 INJECTION, SOLUTION INFILTRATION; PERINEURAL ONCE AS NEEDED
Status: DISCONTINUED | OUTPATIENT
Start: 2018-02-22 | End: 2018-02-23 | Stop reason: HOSPADM

## 2018-02-22 RX ADMIN — METHYLPREDNISOLONE ACETATE 80 MG: 80 INJECTION, SUSPENSION INTRA-ARTICULAR; INTRALESIONAL; INTRAMUSCULAR; SOFT TISSUE at 13:00

## 2018-02-22 NOTE — H&P
[facility]    History and Physical    Patient Name: Chrissy Gutierrez  :  1950  MRN:  [id number]  Date of Admission: 2018    Subjective     Patient is a 67 y.o. female presents with chief complaint of chronic low back pain.  Onset of symptoms was gradual starting a few months ago.  Symptoms are associated/aggravated by activity. Symptoms improve with injection.  Pain 50% improved, 4/10, equally painful on both sides.    The following portions of the patients history were reviewed and updated as appropriate: current medications, allergies, past medical history, past surgical history, past family history, past social history and problem list                Objective     Past Medical History:   Past Medical History:   Diagnosis Date   • Cancer     right kidney   • Chronic kidney disease    • Hypertension    • Low back pain    • Osteoarthritis    • Peripheral neuropathy      Past Surgical History:   Past Surgical History:   Procedure Laterality Date   • EPIDURAL BLOCK     • KIDNEY SURGERY Right 2017    REMOVED RIGHT KIDNEY   • LUMBAR DISCECTOMY FUSION INSTRUMENTATION N/A 2017    Procedure: 1 LEVEL LAMINECTOMY DECOMPRESSION L4 5 EXCISION FACET CYST;  Surgeon: Negrito Oconnell DO;  Location: [facility];  Service:    • TUBAL ABDOMINAL LIGATION     • WISDOM TOOTH EXTRACTION       Family History: No family history on file.  Social History:   Social History   Substance Use Topics   • Smoking status: Never Smoker   • Smokeless tobacco: Never Used   • Alcohol use Yes      Comment: socially       Vital Signs Range for the last 24 hours  Temperature:     Temp Source:     BP:     Pulse:     Respirations:     SPO2:     O2 Amount (l/min):     O2 Devices     Weight:           --------------------------------------------------------------------------------    Current Outpatient Prescriptions   Medication Sig Dispense Refill   • acetaminophen (TYLENOL) 500 MG tablet Take 500 mg by mouth Every 6 (Six) Hours As  Needed for Mild Pain .     • amLODIPine-benazepril (LOTREL) 10-40 MG per capsule Take 1 capsule by mouth.     • carvedilol (COREG) 25 MG tablet Take 25 mg by mouth.     • glimepiride (AMARYL) 2 MG tablet Take 2 mg by mouth.     • hydrochlorothiazide (HYDRODIURIL) 25 MG tablet Take 25 mg by mouth.     • HYDROcodone-acetaminophen (NORCO) 5-325 MG per tablet Take 1 tablet by mouth Every 6 (Six) Hours As Needed (Pain). 40 tablet 0   • metFORMIN (GLUCOPHAGE) 500 MG tablet Take 500 mg by mouth 2 (Two) Times a Day With Meals.     • Misc Natural Products (OSTEO BI-FLEX ADV DOUBLE ST PO) Take 2 tablets by mouth Daily.     • Multiple Vitamins-Minerals (MULTIVITAMIN ADULT PO) Take 1 tablet by mouth Daily.     • NIACIN PO Take 1 tablet by mouth Daily.     • simvastatin (ZOCOR) 20 MG tablet Take 20 mg by mouth.     • Garlic 400 MG tablet delayed-release Take 1 tablet by mouth. On hold for sx.     • Omega-3 Fatty Acids (FISH OIL CONCENTRATE PO) Take 2,000 mg by mouth Daily.       No current facility-administered medications for this encounter.        --------------------------------------------------------------------------------  Assessment/Plan      Anesthesia Evaluation     Patient summary reviewed and Nursing notes reviewed                Airway   Mallampati: II  TM distance: >3 FB  Neck ROM: full  no difficulty expected  Dental - normal exam     Pulmonary - negative pulmonary ROS    breath sounds clear to auscultation  Cardiovascular - normal exam  Exercise tolerance: good (4-7 METS)    Rhythm: regular  Rate: normal    (+) hypertension,       Neuro/Psych- negative ROS and neuro exam normal  GI/Hepatic/Renal/Endo      Musculoskeletal (-) normal exam    (+) back pain,   Abdominal  - normal exam   Substance History - negative use     OB/GYN          Other   (+) arthritis   history of cancer               Diagnosis and Plan    Treatment Plan  ASA 2   Patient has had previous injection/procedure with 50-75% improvement.    Procedures: Lumbar Epidural Steroid Injection(LESI), With fluoroscopy,       Anesthetic plan and risks discussed with patient.          Diagnosis     * DDD (degenerative disc disease), lumbar [M51.36]     * Lumbar radicular pain [M54.16]

## 2018-02-22 NOTE — ANESTHESIA PROCEDURE NOTES
PAIN Epidural block    Patient location during procedure: pain clinic  Indication:procedure for pain  Performed By  Anesthesiologist: DOMINIC POSEY  Preanesthetic Checklist  Completed: patient identified, site marked, surgical consent, pre-op evaluation, timeout performed, IV checked, risks and benefits discussed and monitors and equipment checked  Additional Notes  LDDD/LRAD- NO DYE DUE TO SINGLE KIDNEY  Prep:  Pt Position:prone  Sterile Tech:cap, gloves, mask and sterile barrier  Prep:chlorhexidine gluconate and isopropyl alcohol  Monitoring:blood pressure monitoring, continuous pulse oximetry and EKG  Procedure:  Sedation: no   Approach:midline  Guidance: fluoroscopy  Location:lumbar  Level:3-4  Needle Type:Tuohy  Needle Gauge:20  Aspiration:negative  Medications:  Depomedrol:80  Preservative Free Saline:3mL    Post Assessment:  Dressing:secured with tape  Pt Tolerance:patient tolerated the procedure well with no apparent complications  Complications:no

## 2018-12-06 ENCOUNTER — HOSPITAL ENCOUNTER (OUTPATIENT)
Dept: GENERAL RADIOLOGY | Facility: HOSPITAL | Age: 68
Discharge: HOME | End: 2018-12-06
Attending: UROLOGY | Admitting: UROLOGY

## 2018-12-06 DIAGNOSIS — C64.1 MALIGNANT NEOPLASM OF RIGHT KIDNEY, EXCEPT RENAL PELVIS (HCC): ICD-10-CM

## 2018-12-06 PROCEDURE — 71046 X-RAY EXAM CHEST 2 VIEWS: CPT

## 2021-03-09 DIAGNOSIS — Z23 IMMUNIZATION DUE: ICD-10-CM

## 2021-04-27 ENCOUNTER — IMMUNIZATION (OUTPATIENT)
Dept: VACCINE CLINIC | Facility: HOSPITAL | Age: 71
End: 2021-04-27

## 2021-04-27 PROCEDURE — 91300 HC SARSCOV02 VAC 30MCG/0.3ML IM: CPT | Performed by: INTERNAL MEDICINE

## 2021-04-27 PROCEDURE — 0001A: CPT | Performed by: INTERNAL MEDICINE

## 2021-05-18 ENCOUNTER — IMMUNIZATION (OUTPATIENT)
Dept: VACCINE CLINIC | Facility: HOSPITAL | Age: 71
End: 2021-05-18

## 2021-05-18 PROCEDURE — 0002A: CPT | Performed by: INTERNAL MEDICINE

## 2021-05-18 PROCEDURE — 91300 HC SARSCOV02 VAC 30MCG/0.3ML IM: CPT | Performed by: INTERNAL MEDICINE

## 2023-09-16 ENCOUNTER — APPOINTMENT (OUTPATIENT)
Dept: GENERAL RADIOLOGY | Facility: HOSPITAL | Age: 73
End: 2023-09-16
Payer: MEDICARE

## 2023-09-16 ENCOUNTER — HOSPITAL ENCOUNTER (EMERGENCY)
Facility: HOSPITAL | Age: 73
Discharge: HOME | End: 2023-09-16
Attending: EMERGENCY MEDICINE
Payer: MEDICARE

## 2023-09-16 VITALS
SYSTOLIC BLOOD PRESSURE: 125 MMHG | TEMPERATURE: 98.4 F | RESPIRATION RATE: 16 BRPM | DIASTOLIC BLOOD PRESSURE: 66 MMHG | OXYGEN SATURATION: 95 % | HEART RATE: 88 BPM

## 2023-09-16 DIAGNOSIS — M51.34 THORACIC DEGENERATIVE DISC DISEASE: ICD-10-CM

## 2023-09-16 DIAGNOSIS — M54.6 ACUTE THORACIC BACK PAIN, UNSPECIFIED BACK PAIN LATERALITY: Primary | ICD-10-CM

## 2023-09-16 PROCEDURE — 71046 X-RAY EXAM CHEST 2 VIEWS: CPT

## 2023-09-16 PROCEDURE — 99282 EMERGENCY DEPT VISIT SF MDM: CPT

## 2023-09-16 RX ORDER — ASPIRIN 81 MG/1
81 TABLET ORAL DAILY
COMMUNITY

## 2023-09-16 RX ORDER — SODIUM BICARBONATE 650 MG/1
650 TABLET ORAL DAILY
COMMUNITY

## 2023-09-16 RX ORDER — ROSUVASTATIN CALCIUM 10 MG/1
10 TABLET, COATED ORAL DAILY
COMMUNITY

## 2023-09-16 RX ORDER — FUROSEMIDE 20 MG/1
20 TABLET ORAL DAILY
COMMUNITY

## 2023-09-16 RX ORDER — PREDNISONE 20 MG/1
TABLET ORAL
Qty: 10 TABLET | Refills: 0 | Status: SHIPPED | OUTPATIENT
Start: 2023-09-16

## 2023-09-16 RX ORDER — METHOCARBAMOL 500 MG/1
500 TABLET, FILM COATED ORAL 4 TIMES DAILY PRN
Qty: 15 TABLET | Refills: 0 | Status: SHIPPED | OUTPATIENT
Start: 2023-09-16

## 2023-09-16 NOTE — ED PROVIDER NOTES
EMERGENCY DEPARTMENT ENCOUNTER    Room Number:  19/19  Date of encounter:  9/16/2023  PCP: Jai Steven MD  Historian: Patient    Patient was placed in face mask during triage process. Patient was wearing facemask when I entered the room and throughout our encounter. I wore full protective equipment throughout this patient encounter including a face mask, eye protection, and gloves. Hand hygiene was performed before donning protective equipment and again following doffing of PPE after leaving the room.    HPI:  Chief Complaint: Thoracic back pain  A complete HPI/ROS/PMH/PSH/SH/FH are unobtainable due to: N/A   Context: Chrissy Gutierrez is a 73 y.o. female who presents to the ED c/o thoracic back pain for the last 3 to 4 days.  Worse with rolling over in bed, deep inspirations or coughs.  No new or worsening cough.  No hemoptysis.  No known trauma.  No anterior chest pain though sometimes the pain does radiate down towards her breast line on each side.  No abdominal pain nausea or vomiting.  No new focal numbness or weakness of the upper or lower extremities.  No ongoing dyspnea or anterior chest pain reported.  No reported fevers, black or bloody stools, nausea vomiting or diarrhea.  Patient has been trying muscle relaxers at home which transiently improve the discomfort but do not resolve it.      MEDICAL HISTORY REVIEW  Additional sources:  - Discussed/ obtained information from independent historians:      - External (non-ED) record review:   PCP office note 8/31/2023 reviewed:  Wellness follow-up.  Patient followed for type 2 diabetes with diabetic neuropathy, CKD stage III, mixed hyperlipidemia, hypertension, and onychomycosis    - Chronic or social conditions impacting care:       PAST MEDICAL HISTORY  Active Ambulatory Problems     Diagnosis Date Noted    No Active Ambulatory Problems     Resolved Ambulatory Problems     Diagnosis Date Noted    No Resolved Ambulatory Problems     Past Medical  History:   Diagnosis Date    Cancer     Chronic kidney disease     Hypertension     Low back pain     Osteoarthritis     Peripheral neuropathy          PAST SURGICAL HISTORY  Past Surgical History:   Procedure Laterality Date    BACK SURGERY  2021    EPIDURAL BLOCK      KIDNEY SURGERY Right 03/08/2017    REMOVED RIGHT KIDNEY    LUMBAR DISCECTOMY FUSION INSTRUMENTATION N/A 06/09/2017    Procedure: 1 LEVEL LAMINECTOMY DECOMPRESSION L4 5 EXCISION FACET CYST;  Surgeon: Negrito Oconnell DO;  Location: [facility] OR;  Service:     TUBAL ABDOMINAL LIGATION      WISDOM TOOTH EXTRACTION           FAMILY HISTORY  History reviewed. No pertinent family history.      SOCIAL HISTORY  Social History     Socioeconomic History    Marital status:    Tobacco Use    Smoking status: Never    Smokeless tobacco: Never   Substance and Sexual Activity    Alcohol use: Yes     Comment: socially    Drug use: No     Types: Hydrocodone    Sexual activity: Defer         ALLERGIES  Ibuprofen        REVIEW OF SYSTEMS  Review of Systems     All systems reviewed and negative except for those discussed in HPI.       PHYSICAL EXAM    I have reviewed the triage vital signs and nursing notes.    ED Triage Vitals   Temp Heart Rate Resp BP SpO2   09/16/23 1432 09/16/23 1432 09/16/23 1432 09/16/23 1434 09/16/23 1432   98.4 °F (36.9 °C) 107 18 154/91 97 %      Temp src Heart Rate Source Patient Position BP Location FiO2 (%)   -- -- -- -- --              Physical Exam    Physical Exam   Constitutional: No distress.   HENT:  Head: Normocephalic and atraumatic.   Oropharynx: Mucous membranes are moist.   Eyes: No scleral icterus.   Neck: Neck supple.   Cardiovascular: Pink, warm and well-perfused throughout  Pulmonary/Chest: No respiratory distress.  No tachypnea or increased work of breathing.  Atraumatic nontender posterior thoracic examination.  Abdominal: Soft.  No rebound or rigidity  Musculoskeletal: Moves all extremities equally.   Neurological:  Alert.  Speech fluent and easily intelligible  Skin: Skin is pink, warm, and dry. No pallor.   Psychiatric: Mood and affect normal.   Nursing note and vitals reviewed.    LAB RESULTS  No results found for this or any previous visit (from the past 24 hour(s)).    Ordered the above labs and independently reviewed the results.        RADIOLOGY  XR Chest 2 View    Result Date: 9/16/2023  Clinical: Thoracic back pain  COMPARISON 12/6/2018  FINDINGS: Cardiac size within normal limits. No mediastinal or hilar abnormality has developed. The right lung is clear. No pleural effusion or pulmonary edema is demonstrated. Minimal chronic parenchymal change noted at the left lung base as before. No acute airspace disease has developed within the left lung.  There is again demonstrated multilevel T-spine disc degeneration with spurring and endplate hypertrophy. The remainder is unremarkable.  This report was finalized on 9/16/2023 4:09 PM by Dr. Anuj Reyes M.D.       I ordered the above noted radiological studies. Reviewed by me and discussed with radiologist.  See dictation for official radiology interpretation.      PROCEDURES    Procedures        MEDICATIONS GIVEN IN ER    Medications - No data to display      PROGRESS, DATA ANALYSIS, CONSULTS, AND MEDICAL DECISION MAKING    My differential diagnosis for back pain includes but is not limited to:  Musculoskeletal strain, contusion, retroperitoneal hematoma, disc protrusion, vertebral fracture, transverse process fracture, rib fracture, facet syndrome, sacroiliac joint strain, sciatica, renal injury, splenic injury, pancreatic injury, osteoarthritis, lumbar spondylosis, spinal stenosis, ankylosing spondylitis, sacroiliac joint inflammation, pancreatitis, perforated peptic ulcer, diverticulitis, endometriosis, chronic PID, epidural abscess, osteomyelitis, retroperitoneal abscess, pyelonephritis, pneumonia, subphrenic abscess, tuberculosis, neurofibroma, meningioma, multiple  myeloma, lymphoma, metastatic cancer, primary cancer, AAA, aortic dissection, spinal ischemia, referred pain, ureterolithiasis      All labs have been independently reviewed by me.  All radiology studies have been reviewed by me and discussed with radiologist dictating the report.   EKG's independently viewed and interpreted by me.  Discussion below represents my analysis of pertinent findings related to patient's condition, differential diagnosis, treatment plan and final disposition.      ED Course as of 09/16/23 1623   Sat Sep 16, 2023   1512 Shared decision making:   I have discussed with the patient possibility of deep evaluation including CT angiography chest to ensure that pathology of the aorta is not causing her discomfort or less invasive studies such as chest x-ray, EKG and labs to evaluate for the possibility of atypical presenting angina.  Patient's preference at this time would just be to simply have a two-view chest x-ray.   [RS]   1526 Bilateral lower extremity blood pressures:  Right 115/78; left 132/78 [RS]   1552 RADIOLOGY      Study: Two-view chest  Findings: No pneumothorax or focal infiltrate appreciated  I independently viewed and interpreted these images contemporaneously with treatment.    [RS]   1622 I have reviewed findings with the patient and her son.  We again discussed differential diagnosis.  We talked about further testing including labs, EKG and potentially CTA of the chest.  Patient would like to move forward with treating this as musculoskeletal/degenerative disease of the back but is agreeable to return with any worsening symptoms or red flags as discussed. [RS]      ED Course User Index  [RS] Fermin Gutierrez MD       AS OF 16:23 EDT VITALS:    BP - 125/66  HR - 88  TEMP - 98.4 °F (36.9 °C)  O2 SATS - 95%        DIAGNOSIS  Final diagnoses:   Acute thoracic back pain, unspecified back pain laterality   Thoracic degenerative disc disease         DISPOSITION  DISCHARGE    Patient  discharged in stable condition.    Reviewed implications of results, diagnosis, meds, responsibility to follow up, warning signs and symptoms of possible worsening, potential complications and reasons to return to ER.    Patient/Family voiced understanding of above instructions.    Discussed plan for discharge, as there is no emergent indication for admission. Patient referred to primary care provider for regular health maintenance. Pt/family is agreeable and understands need for follow up and possible repeat testing.  Pt is aware that discharge does not mean that nothing is wrong but it indicates no emergency is present that requires admission and they must continue care with follow-up as given below or physician of their choice.     FOLLOW-UP  Jai Steven MD  [address]  [facility] [address] [phone]    Call in 2 days  Schedule close outpatient follow-up for further evaluation and treatment    [facility] EMERGENCY DEPARTMENT  [address]  [facility] [address] [phone]  Go to   As needed, If symptoms worsen         Medication List        New Prescriptions      methocarbamol 500 MG tablet  Commonly known as: ROBAXIN  Take 1 tablet by mouth 4 (Four) Times a Day As Needed for Muscle Spasms.     predniSONE 20 MG tablet  Commonly known as: DELTASONE  2 tablets by mouth daily for 5 days               Where to Get Your Medications        These medications were sent to [facility] [address] [place] [address] - [address] [facility] - [phone]  - [phone]   [address] [place] [id number]      Phone: [phone]   methocarbamol 500 MG tablet  predniSONE 20 MG tablet           Please note that portions of this were completed with a voice recognition program.       Note Disclaimer: At [facility], we believe that sharing information builds trust and better relationships. You are receiving this note because you are receiving care at  [facility] or recently visited. It is possible you will see health information before a provider has talked with you about it. This kind of information can be easy to misunderstand. To help you fully understand what it means for your health, we urge you to discuss this note with your provider.     Fermin Gutierrez MD  09/16/23 [id number]

## 2025-02-10 ENCOUNTER — APPOINTMENT (OUTPATIENT)
Dept: GENERAL RADIOLOGY | Facility: HOSPITAL | Age: 75
DRG: 853 | End: 2025-02-10
Payer: MEDICARE

## 2025-02-10 ENCOUNTER — HOSPITAL ENCOUNTER (INPATIENT)
Facility: HOSPITAL | Age: 75
LOS: 15 days | Discharge: NURSING, REHAB OR LONG-TERM CARE | DRG: 853 | End: 2025-02-25
Attending: EMERGENCY MEDICINE | Admitting: HOSPITALIST
Payer: MEDICARE

## 2025-02-10 DIAGNOSIS — J18.9 PNEUMONIA OF RIGHT LOWER LOBE DUE TO INFECTIOUS ORGANISM: ICD-10-CM

## 2025-02-10 DIAGNOSIS — J96.01 ACUTE RESPIRATORY FAILURE WITH HYPOXIA: Primary | ICD-10-CM

## 2025-02-10 DIAGNOSIS — N17.9 AKI (ACUTE KIDNEY INJURY): ICD-10-CM

## 2025-02-10 DIAGNOSIS — M25.562 ACUTE PAIN OF BOTH KNEES: ICD-10-CM

## 2025-02-10 DIAGNOSIS — R53.1 GENERALIZED WEAKNESS: ICD-10-CM

## 2025-02-10 DIAGNOSIS — M25.561 ACUTE PAIN OF BOTH KNEES: ICD-10-CM

## 2025-02-10 DIAGNOSIS — Z09 FOLLOW-UP EXAM: ICD-10-CM

## 2025-02-10 DIAGNOSIS — M00.9 SEPTIC ARTHRITIS OF KNEE, LEFT: ICD-10-CM

## 2025-02-10 DIAGNOSIS — N39.0 ACUTE UTI: ICD-10-CM

## 2025-02-10 PROBLEM — A41.9 SEPSIS, UNSPECIFIED ORGANISM: Status: ACTIVE | Noted: 2025-02-10

## 2025-02-10 PROBLEM — J15.9 BACTERIAL PNEUMONIA: Status: ACTIVE | Noted: 2025-02-10

## 2025-02-10 PROBLEM — E11.65 TYPE 2 DIABETES MELLITUS WITH HYPERGLYCEMIA, WITH LONG-TERM CURRENT USE OF INSULIN: Status: ACTIVE | Noted: 2025-02-10

## 2025-02-10 PROBLEM — N18.30 CKD (CHRONIC KIDNEY DISEASE) STAGE 3, GFR 30-59 ML/MIN: Status: ACTIVE | Noted: 2025-02-10

## 2025-02-10 PROBLEM — Z79.4 TYPE 2 DIABETES MELLITUS WITH HYPERGLYCEMIA, WITH LONG-TERM CURRENT USE OF INSULIN: Status: ACTIVE | Noted: 2025-02-10

## 2025-02-10 PROBLEM — J10.1 INFLUENZA A: Status: ACTIVE | Noted: 2025-02-10

## 2025-02-10 LAB
ALBUMIN SERPL-MCNC: 3.2 G/DL (ref 3.5–5.2)
ALBUMIN/GLOB SERPL: 0.8 G/DL
ALP SERPL-CCNC: 84 U/L (ref 39–117)
ALT SERPL W P-5'-P-CCNC: 108 U/L (ref 1–33)
ANION GAP SERPL CALCULATED.3IONS-SCNC: 16.8 MMOL/L (ref 5–15)
ANION GAP SERPL CALCULATED.3IONS-SCNC: 17.5 MMOL/L (ref 5–15)
AST SERPL-CCNC: 77 U/L (ref 1–32)
B PARAPERT DNA SPEC QL NAA+PROBE: NOT DETECTED
B PERT DNA SPEC QL NAA+PROBE: NOT DETECTED
BACTERIA UR QL AUTO: ABNORMAL /HPF
BASOPHILS # BLD AUTO: 0.03 10*3/MM3 (ref 0–0.2)
BASOPHILS NFR BLD AUTO: 0.2 % (ref 0–1.5)
BILIRUB SERPL-MCNC: 0.3 MG/DL (ref 0–1.2)
BILIRUB UR QL STRIP: NEGATIVE
BUN SERPL-MCNC: 53 MG/DL (ref 8–23)
BUN SERPL-MCNC: 56 MG/DL (ref 8–23)
BUN/CREAT SERPL: 32.4 (ref 7–25)
BUN/CREAT SERPL: 33.5 (ref 7–25)
C PNEUM DNA NPH QL NAA+NON-PROBE: NOT DETECTED
CALCIUM SPEC-SCNC: 8.6 MG/DL (ref 8.6–10.5)
CALCIUM SPEC-SCNC: 8.8 MG/DL (ref 8.6–10.5)
CHLORIDE SERPL-SCNC: 104 MMOL/L (ref 98–107)
CHLORIDE SERPL-SCNC: 105 MMOL/L (ref 98–107)
CLARITY UR: ABNORMAL
CO2 SERPL-SCNC: 14.5 MMOL/L (ref 22–29)
CO2 SERPL-SCNC: 15.2 MMOL/L (ref 22–29)
COLOR UR: YELLOW
CREAT SERPL-MCNC: 1.58 MG/DL (ref 0.57–1)
CREAT SERPL-MCNC: 1.73 MG/DL (ref 0.57–1)
CRP SERPL-MCNC: 35.05 MG/DL (ref 0–0.5)
D-LACTATE SERPL-SCNC: 1.2 MMOL/L (ref 0.5–2)
DEPRECATED RDW RBC AUTO: 38.6 FL (ref 37–54)
DEPRECATED RDW RBC AUTO: 39.4 FL (ref 37–54)
EGFRCR SERPLBLD CKD-EPI 2021: 30.7 ML/MIN/1.73
EGFRCR SERPLBLD CKD-EPI 2021: 34.2 ML/MIN/1.73
EOSINOPHIL # BLD AUTO: 0.01 10*3/MM3 (ref 0–0.4)
EOSINOPHIL NFR BLD AUTO: 0.1 % (ref 0.3–6.2)
ERYTHROCYTE [DISTWIDTH] IN BLOOD BY AUTOMATED COUNT: 12.2 % (ref 12.3–15.4)
ERYTHROCYTE [DISTWIDTH] IN BLOOD BY AUTOMATED COUNT: 12.2 % (ref 12.3–15.4)
FLUAV SUBTYP SPEC NAA+PROBE: ABNORMAL
FLUBV RNA ISLT QL NAA+PROBE: NOT DETECTED
GEN 5 1HR TROPONIN T REFLEX: 59 NG/L
GLOBULIN UR ELPH-MCNC: 4 GM/DL
GLUCOSE BLDC GLUCOMTR-MCNC: 158 MG/DL (ref 70–130)
GLUCOSE BLDC GLUCOMTR-MCNC: 181 MG/DL (ref 70–130)
GLUCOSE BLDC GLUCOMTR-MCNC: 199 MG/DL (ref 70–130)
GLUCOSE BLDC GLUCOMTR-MCNC: 229 MG/DL (ref 70–130)
GLUCOSE SERPL-MCNC: 209 MG/DL (ref 65–99)
GLUCOSE SERPL-MCNC: 234 MG/DL (ref 65–99)
GLUCOSE UR STRIP-MCNC: ABNORMAL MG/DL
HADV DNA SPEC NAA+PROBE: NOT DETECTED
HCOV 229E RNA SPEC QL NAA+PROBE: NOT DETECTED
HCOV HKU1 RNA SPEC QL NAA+PROBE: NOT DETECTED
HCOV NL63 RNA SPEC QL NAA+PROBE: NOT DETECTED
HCOV OC43 RNA SPEC QL NAA+PROBE: NOT DETECTED
HCT VFR BLD AUTO: 31 % (ref 34–46.6)
HCT VFR BLD AUTO: 31 % (ref 34–46.6)
HGB BLD-MCNC: 10.3 G/DL (ref 12–15.9)
HGB BLD-MCNC: 10.4 G/DL (ref 12–15.9)
HGB UR QL STRIP.AUTO: ABNORMAL
HMPV RNA NPH QL NAA+NON-PROBE: NOT DETECTED
HPIV1 RNA ISLT QL NAA+PROBE: NOT DETECTED
HPIV2 RNA SPEC QL NAA+PROBE: NOT DETECTED
HPIV3 RNA NPH QL NAA+PROBE: NOT DETECTED
HPIV4 P GENE NPH QL NAA+PROBE: NOT DETECTED
HYALINE CASTS UR QL AUTO: ABNORMAL /LPF
IMM GRANULOCYTES # BLD AUTO: 0.16 10*3/MM3 (ref 0–0.05)
IMM GRANULOCYTES NFR BLD AUTO: 0.9 % (ref 0–0.5)
KETONES UR QL STRIP: ABNORMAL
LEUKOCYTE ESTERASE UR QL STRIP.AUTO: ABNORMAL
LYMPHOCYTES # BLD AUTO: 0.84 10*3/MM3 (ref 0.7–3.1)
LYMPHOCYTES NFR BLD AUTO: 4.9 % (ref 19.6–45.3)
M PNEUMO IGG SER IA-ACNC: NOT DETECTED
MCH RBC QN AUTO: 29.4 PG (ref 26.6–33)
MCH RBC QN AUTO: 29.8 PG (ref 26.6–33)
MCHC RBC AUTO-ENTMCNC: 33.2 G/DL (ref 31.5–35.7)
MCHC RBC AUTO-ENTMCNC: 33.5 G/DL (ref 31.5–35.7)
MCV RBC AUTO: 88.6 FL (ref 79–97)
MCV RBC AUTO: 88.8 FL (ref 79–97)
MONOCYTES # BLD AUTO: 1.56 10*3/MM3 (ref 0.1–0.9)
MONOCYTES NFR BLD AUTO: 9.1 % (ref 5–12)
NEUTROPHILS NFR BLD AUTO: 14.5 10*3/MM3 (ref 1.7–7)
NEUTROPHILS NFR BLD AUTO: 84.8 % (ref 42.7–76)
NITRITE UR QL STRIP: NEGATIVE
NRBC BLD AUTO-RTO: 0 /100 WBC (ref 0–0.2)
NT-PROBNP SERPL-MCNC: 310 PG/ML (ref 0–900)
PH UR STRIP.AUTO: 5.5 [PH] (ref 5–8)
PLATELET # BLD AUTO: 411 10*3/MM3 (ref 140–450)
PLATELET # BLD AUTO: 420 10*3/MM3 (ref 140–450)
PMV BLD AUTO: 8.8 FL (ref 6–12)
PMV BLD AUTO: 8.9 FL (ref 6–12)
POTASSIUM SERPL-SCNC: 4.5 MMOL/L (ref 3.5–5.2)
POTASSIUM SERPL-SCNC: 4.8 MMOL/L (ref 3.5–5.2)
PROCALCITONIN SERPL-MCNC: 0.95 NG/ML (ref 0–0.25)
PROT SERPL-MCNC: 7.2 G/DL (ref 6–8.5)
PROT UR QL STRIP: ABNORMAL
QT INTERVAL: 370 MS
QTC INTERVAL: 447 MS
RBC # BLD AUTO: 3.49 10*6/MM3 (ref 3.77–5.28)
RBC # BLD AUTO: 3.5 10*6/MM3 (ref 3.77–5.28)
RBC # UR STRIP: ABNORMAL /HPF
REF LAB TEST METHOD: ABNORMAL
RHINOVIRUS RNA SPEC NAA+PROBE: NOT DETECTED
RSV RNA NPH QL NAA+NON-PROBE: NOT DETECTED
SARS-COV-2 RNA NPH QL NAA+NON-PROBE: NOT DETECTED
SODIUM SERPL-SCNC: 136 MMOL/L (ref 136–145)
SODIUM SERPL-SCNC: 137 MMOL/L (ref 136–145)
SP GR UR STRIP: 1.02 (ref 1–1.03)
SQUAMOUS #/AREA URNS HPF: ABNORMAL /HPF
TROPONIN T % DELTA: 4
TROPONIN T NUMERIC DELTA: 2 NG/L
TROPONIN T SERPL HS-MCNC: 57 NG/L
TROPONIN T SERPL HS-MCNC: 62 NG/L
URATE SERPL-MCNC: 11.2 MG/DL (ref 2.4–5.7)
UROBILINOGEN UR QL STRIP: ABNORMAL
WBC # UR STRIP: ABNORMAL /HPF
WBC NRBC COR # BLD AUTO: 16.07 10*3/MM3 (ref 3.4–10.8)
WBC NRBC COR # BLD AUTO: 17.1 10*3/MM3 (ref 3.4–10.8)

## 2025-02-10 PROCEDURE — 73562 X-RAY EXAM OF KNEE 3: CPT

## 2025-02-10 PROCEDURE — 85025 COMPLETE CBC W/AUTO DIFF WBC: CPT | Performed by: PHYSICIAN ASSISTANT

## 2025-02-10 PROCEDURE — 86140 C-REACTIVE PROTEIN: CPT | Performed by: HOSPITALIST

## 2025-02-10 PROCEDURE — 99291 CRITICAL CARE FIRST HOUR: CPT

## 2025-02-10 PROCEDURE — 25810000003 LACTATED RINGERS SOLUTION: Performed by: PHYSICIAN ASSISTANT

## 2025-02-10 PROCEDURE — 85027 COMPLETE CBC AUTOMATED: CPT | Performed by: NURSE PRACTITIONER

## 2025-02-10 PROCEDURE — 81001 URINALYSIS AUTO W/SCOPE: CPT | Performed by: PHYSICIAN ASSISTANT

## 2025-02-10 PROCEDURE — 87040 BLOOD CULTURE FOR BACTERIA: CPT | Performed by: PHYSICIAN ASSISTANT

## 2025-02-10 PROCEDURE — 94760 N-INVAS EAR/PLS OXIMETRY 1: CPT

## 2025-02-10 PROCEDURE — 93010 ELECTROCARDIOGRAM REPORT: CPT | Performed by: INTERNAL MEDICINE

## 2025-02-10 PROCEDURE — 63710000001 INSULIN LISPRO (HUMAN) PER 5 UNITS: Performed by: HOSPITALIST

## 2025-02-10 PROCEDURE — 63710000001 INSULIN GLARGINE PER 5 UNITS: Performed by: HOSPITALIST

## 2025-02-10 PROCEDURE — 71045 X-RAY EXAM CHEST 1 VIEW: CPT

## 2025-02-10 PROCEDURE — 0202U NFCT DS 22 TRGT SARS-COV-2: CPT | Performed by: PHYSICIAN ASSISTANT

## 2025-02-10 PROCEDURE — 94761 N-INVAS EAR/PLS OXIMETRY MLT: CPT

## 2025-02-10 PROCEDURE — 94640 AIRWAY INHALATION TREATMENT: CPT

## 2025-02-10 PROCEDURE — 94799 UNLISTED PULMONARY SVC/PX: CPT

## 2025-02-10 PROCEDURE — 87077 CULTURE AEROBIC IDENTIFY: CPT | Performed by: PHYSICIAN ASSISTANT

## 2025-02-10 PROCEDURE — 25810000003 SODIUM CHLORIDE 0.9 % SOLUTION: Performed by: NURSE PRACTITIONER

## 2025-02-10 PROCEDURE — 82948 REAGENT STRIP/BLOOD GLUCOSE: CPT

## 2025-02-10 PROCEDURE — 84484 ASSAY OF TROPONIN QUANT: CPT | Performed by: PHYSICIAN ASSISTANT

## 2025-02-10 PROCEDURE — 84484 ASSAY OF TROPONIN QUANT: CPT | Performed by: NURSE PRACTITIONER

## 2025-02-10 PROCEDURE — 36415 COLL VENOUS BLD VENIPUNCTURE: CPT | Performed by: NURSE PRACTITIONER

## 2025-02-10 PROCEDURE — 25010000002 CEFTRIAXONE PER 250 MG: Performed by: PHYSICIAN ASSISTANT

## 2025-02-10 PROCEDURE — 87086 URINE CULTURE/COLONY COUNT: CPT | Performed by: PHYSICIAN ASSISTANT

## 2025-02-10 PROCEDURE — 83880 ASSAY OF NATRIURETIC PEPTIDE: CPT | Performed by: PHYSICIAN ASSISTANT

## 2025-02-10 PROCEDURE — 87186 SC STD MICRODIL/AGAR DIL: CPT | Performed by: PHYSICIAN ASSISTANT

## 2025-02-10 PROCEDURE — 83605 ASSAY OF LACTIC ACID: CPT | Performed by: PHYSICIAN ASSISTANT

## 2025-02-10 PROCEDURE — 84550 ASSAY OF BLOOD/URIC ACID: CPT | Performed by: HOSPITALIST

## 2025-02-10 PROCEDURE — 25010000002 ENOXAPARIN PER 10 MG: Performed by: HOSPITALIST

## 2025-02-10 PROCEDURE — 84145 PROCALCITONIN (PCT): CPT | Performed by: PHYSICIAN ASSISTANT

## 2025-02-10 PROCEDURE — 93005 ELECTROCARDIOGRAM TRACING: CPT | Performed by: PHYSICIAN ASSISTANT

## 2025-02-10 PROCEDURE — 80053 COMPREHEN METABOLIC PANEL: CPT | Performed by: PHYSICIAN ASSISTANT

## 2025-02-10 RX ORDER — NICOTINE POLACRILEX 4 MG
15 LOZENGE BUCCAL
Status: DISCONTINUED | OUTPATIENT
Start: 2025-02-10 | End: 2025-02-25 | Stop reason: HOSPADM

## 2025-02-10 RX ORDER — SODIUM CHLORIDE 9 MG/ML
100 INJECTION, SOLUTION INTRAVENOUS CONTINUOUS
Status: ACTIVE | OUTPATIENT
Start: 2025-02-10 | End: 2025-02-11

## 2025-02-10 RX ORDER — ENOXAPARIN SODIUM 100 MG/ML
40 INJECTION SUBCUTANEOUS EVERY 24 HOURS
Status: DISCONTINUED | OUTPATIENT
Start: 2025-02-10 | End: 2025-02-18

## 2025-02-10 RX ORDER — AMOXICILLIN 250 MG
2 CAPSULE ORAL 2 TIMES DAILY PRN
Status: DISCONTINUED | OUTPATIENT
Start: 2025-02-10 | End: 2025-02-25 | Stop reason: HOSPADM

## 2025-02-10 RX ORDER — SODIUM CHLORIDE 9 MG/ML
40 INJECTION, SOLUTION INTRAVENOUS AS NEEDED
Status: DISCONTINUED | OUTPATIENT
Start: 2025-02-10 | End: 2025-02-25

## 2025-02-10 RX ORDER — OSELTAMIVIR PHOSPHATE 30 MG/1
30 CAPSULE ORAL EVERY 12 HOURS SCHEDULED
Status: DISCONTINUED | OUTPATIENT
Start: 2025-02-10 | End: 2025-02-10

## 2025-02-10 RX ORDER — ROSUVASTATIN CALCIUM 10 MG/1
10 TABLET, COATED ORAL DAILY
Status: DISCONTINUED | OUTPATIENT
Start: 2025-02-10 | End: 2025-02-11

## 2025-02-10 RX ORDER — AZITHROMYCIN 250 MG/1
500 TABLET, FILM COATED ORAL
Status: DISCONTINUED | OUTPATIENT
Start: 2025-02-10 | End: 2025-02-10

## 2025-02-10 RX ORDER — ONDANSETRON 4 MG/1
4 TABLET, ORALLY DISINTEGRATING ORAL EVERY 6 HOURS PRN
Status: DISCONTINUED | OUTPATIENT
Start: 2025-02-10 | End: 2025-02-25 | Stop reason: HOSPADM

## 2025-02-10 RX ORDER — NITROGLYCERIN 0.4 MG/1
0.4 TABLET SUBLINGUAL
Status: DISCONTINUED | OUTPATIENT
Start: 2025-02-10 | End: 2025-02-25 | Stop reason: HOSPADM

## 2025-02-10 RX ORDER — SODIUM CHLORIDE 0.9 % (FLUSH) 0.9 %
10 SYRINGE (ML) INJECTION EVERY 12 HOURS SCHEDULED
Status: DISCONTINUED | OUTPATIENT
Start: 2025-02-10 | End: 2025-02-25 | Stop reason: HOSPADM

## 2025-02-10 RX ORDER — ACETAMINOPHEN 160 MG/5ML
650 SOLUTION ORAL EVERY 4 HOURS PRN
Status: DISCONTINUED | OUTPATIENT
Start: 2025-02-10 | End: 2025-02-14

## 2025-02-10 RX ORDER — ACETAMINOPHEN 325 MG/1
650 TABLET ORAL EVERY 4 HOURS PRN
Status: DISCONTINUED | OUTPATIENT
Start: 2025-02-10 | End: 2025-02-14

## 2025-02-10 RX ORDER — IPRATROPIUM BROMIDE AND ALBUTEROL SULFATE 2.5; .5 MG/3ML; MG/3ML
3 SOLUTION RESPIRATORY (INHALATION)
Status: DISCONTINUED | OUTPATIENT
Start: 2025-02-10 | End: 2025-02-25 | Stop reason: HOSPADM

## 2025-02-10 RX ORDER — ASPIRIN 81 MG/1
81 TABLET ORAL DAILY
Status: DISCONTINUED | OUTPATIENT
Start: 2025-02-10 | End: 2025-02-18

## 2025-02-10 RX ORDER — GLIPIZIDE 5 MG/1
5 TABLET ORAL
Status: DISCONTINUED | OUTPATIENT
Start: 2025-02-10 | End: 2025-02-25 | Stop reason: HOSPADM

## 2025-02-10 RX ORDER — IPRATROPIUM BROMIDE AND ALBUTEROL SULFATE 2.5; .5 MG/3ML; MG/3ML
3 SOLUTION RESPIRATORY (INHALATION) EVERY 4 HOURS PRN
Status: DISCONTINUED | OUTPATIENT
Start: 2025-02-10 | End: 2025-02-25 | Stop reason: HOSPADM

## 2025-02-10 RX ORDER — ACETAMINOPHEN 650 MG/1
650 SUPPOSITORY RECTAL EVERY 4 HOURS PRN
Status: DISCONTINUED | OUTPATIENT
Start: 2025-02-10 | End: 2025-02-14

## 2025-02-10 RX ORDER — CALCIUM CARBONATE 500 MG/1
2 TABLET, CHEWABLE ORAL 2 TIMES DAILY PRN
Status: DISCONTINUED | OUTPATIENT
Start: 2025-02-10 | End: 2025-02-25 | Stop reason: HOSPADM

## 2025-02-10 RX ORDER — ONDANSETRON 2 MG/ML
4 INJECTION INTRAMUSCULAR; INTRAVENOUS EVERY 6 HOURS PRN
Status: DISCONTINUED | OUTPATIENT
Start: 2025-02-10 | End: 2025-02-25 | Stop reason: HOSPADM

## 2025-02-10 RX ORDER — CARVEDILOL 12.5 MG/1
12.5 TABLET ORAL 2 TIMES DAILY WITH MEALS
Status: DISCONTINUED | OUTPATIENT
Start: 2025-02-10 | End: 2025-02-25 | Stop reason: HOSPADM

## 2025-02-10 RX ORDER — SODIUM ZIRCONIUM CYCLOSILICATE 5 G/5G
5 POWDER, FOR SUSPENSION ORAL EVERY OTHER DAY
COMMUNITY
End: 2025-02-25 | Stop reason: HOSPADM

## 2025-02-10 RX ORDER — DAPAGLIFLOZIN 10 MG/1
10 TABLET, FILM COATED ORAL DAILY
COMMUNITY
End: 2025-02-25 | Stop reason: HOSPADM

## 2025-02-10 RX ORDER — IBUPROFEN 600 MG/1
1 TABLET ORAL
Status: DISCONTINUED | OUTPATIENT
Start: 2025-02-10 | End: 2025-02-25 | Stop reason: HOSPADM

## 2025-02-10 RX ORDER — LISINOPRIL 40 MG/1
40 TABLET ORAL
Status: DISCONTINUED | OUTPATIENT
Start: 2025-02-10 | End: 2025-02-12

## 2025-02-10 RX ORDER — CODEINE PHOSPHATE AND GUAIFENESIN 10; 100 MG/5ML; MG/5ML
5 SOLUTION ORAL ONCE
Status: COMPLETED | OUTPATIENT
Start: 2025-02-10 | End: 2025-02-10

## 2025-02-10 RX ORDER — BISACODYL 10 MG
10 SUPPOSITORY, RECTAL RECTAL DAILY PRN
Status: DISCONTINUED | OUTPATIENT
Start: 2025-02-10 | End: 2025-02-25 | Stop reason: HOSPADM

## 2025-02-10 RX ORDER — DEXTROSE MONOHYDRATE 25 G/50ML
25 INJECTION, SOLUTION INTRAVENOUS
Status: DISCONTINUED | OUTPATIENT
Start: 2025-02-10 | End: 2025-02-25 | Stop reason: HOSPADM

## 2025-02-10 RX ORDER — BISACODYL 5 MG/1
5 TABLET, DELAYED RELEASE ORAL DAILY PRN
Status: DISCONTINUED | OUTPATIENT
Start: 2025-02-10 | End: 2025-02-25 | Stop reason: HOSPADM

## 2025-02-10 RX ORDER — SODIUM BICARBONATE 650 MG/1
650 TABLET ORAL DAILY
Status: DISCONTINUED | OUTPATIENT
Start: 2025-02-10 | End: 2025-02-11

## 2025-02-10 RX ORDER — SODIUM CHLORIDE 0.9 % (FLUSH) 0.9 %
10 SYRINGE (ML) INJECTION AS NEEDED
Status: DISCONTINUED | OUTPATIENT
Start: 2025-02-10 | End: 2025-02-25 | Stop reason: HOSPADM

## 2025-02-10 RX ORDER — AMLODIPINE BESYLATE 5 MG/1
10 TABLET ORAL
Status: DISCONTINUED | OUTPATIENT
Start: 2025-02-10 | End: 2025-02-12

## 2025-02-10 RX ORDER — INSULIN LISPRO 100 [IU]/ML
2-9 INJECTION, SOLUTION INTRAVENOUS; SUBCUTANEOUS
Status: DISCONTINUED | OUTPATIENT
Start: 2025-02-10 | End: 2025-02-18

## 2025-02-10 RX ORDER — METHOCARBAMOL 500 MG/1
500 TABLET, FILM COATED ORAL 4 TIMES DAILY PRN
Status: DISCONTINUED | OUTPATIENT
Start: 2025-02-10 | End: 2025-02-25 | Stop reason: HOSPADM

## 2025-02-10 RX ORDER — POLYETHYLENE GLYCOL 3350 17 G/17G
17 POWDER, FOR SOLUTION ORAL DAILY PRN
Status: DISCONTINUED | OUTPATIENT
Start: 2025-02-10 | End: 2025-02-25 | Stop reason: HOSPADM

## 2025-02-10 RX ADMIN — ROSUVASTATIN CALCIUM 10 MG: 10 TABLET, FILM COATED ORAL at 13:24

## 2025-02-10 RX ADMIN — CEFTRIAXONE 2000 MG: 2 INJECTION, POWDER, FOR SOLUTION INTRAMUSCULAR; INTRAVENOUS at 05:39

## 2025-02-10 RX ADMIN — IPRATROPIUM BROMIDE AND ALBUTEROL SULFATE 3 ML: 2.5; .5 SOLUTION RESPIRATORY (INHALATION) at 19:55

## 2025-02-10 RX ADMIN — INSULIN LISPRO 2 UNITS: 100 INJECTION, SOLUTION INTRAVENOUS; SUBCUTANEOUS at 18:47

## 2025-02-10 RX ADMIN — ASPIRIN 81 MG: 81 TABLET, COATED ORAL at 13:23

## 2025-02-10 RX ADMIN — GUAIFENESIN AND CODEINE PHOSPHATE 5 ML: 100; 10 SOLUTION ORAL at 06:02

## 2025-02-10 RX ADMIN — CARVEDILOL 12.5 MG: 12.5 TABLET, FILM COATED ORAL at 13:23

## 2025-02-10 RX ADMIN — INSULIN GLARGINE 10 UNITS: 100 INJECTION, SOLUTION SUBCUTANEOUS at 13:23

## 2025-02-10 RX ADMIN — AZITHROMYCIN DIHYDRATE 500 MG: 250 TABLET ORAL at 08:45

## 2025-02-10 RX ADMIN — GLIPIZIDE 5 MG: 5 TABLET ORAL at 13:24

## 2025-02-10 RX ADMIN — INSULIN LISPRO 4 UNITS: 100 INJECTION, SOLUTION INTRAVENOUS; SUBCUTANEOUS at 13:23

## 2025-02-10 RX ADMIN — SODIUM BICARBONATE 650 MG TABLET 650 MG: at 13:23

## 2025-02-10 RX ADMIN — ACETAMINOPHEN 650 MG: 325 TABLET, FILM COATED ORAL at 09:04

## 2025-02-10 RX ADMIN — CARVEDILOL 12.5 MG: 12.5 TABLET, FILM COATED ORAL at 22:43

## 2025-02-10 RX ADMIN — Medication 10 ML: at 22:43

## 2025-02-10 RX ADMIN — EMPAGLIFLOZIN 25 MG: 25 TABLET, FILM COATED ORAL at 13:24

## 2025-02-10 RX ADMIN — SODIUM CHLORIDE, SODIUM LACTATE, POTASSIUM CHLORIDE, CALCIUM CHLORIDE 1000 ML: 20; 30; 600; 310 INJECTION, SOLUTION INTRAVENOUS at 05:33

## 2025-02-10 RX ADMIN — ENOXAPARIN SODIUM 40 MG: 100 INJECTION SUBCUTANEOUS at 13:23

## 2025-02-10 RX ADMIN — SODIUM CHLORIDE 100 ML/HR: 9 INJECTION, SOLUTION INTRAVENOUS at 17:49

## 2025-02-10 RX ADMIN — Medication 10 ML: at 13:24

## 2025-02-10 RX ADMIN — OSELTAMIVIR PHOSPHATE 30 MG: 30 CAPSULE ORAL at 09:04

## 2025-02-10 RX ADMIN — SODIUM CHLORIDE 100 ML/HR: 9 INJECTION, SOLUTION INTRAVENOUS at 07:37

## 2025-02-10 NOTE — CONSULTS
Orthopaedic Surgery  Consult Note  Arvind Ibarra MD    [phone]    HPI:  Patient is a 74 y.o.female who presents with past history of hypertension, diabetes mellitus type 2, CKD 3 who presented to hospital with primarily weakness and left knee pain. She says that she fell onto her butt 3 days ago however denies falling directly onto the knee. She has had increased pain and stiffness in the knee since that time and this also coincides with her having a severe upper respiratory infection with shortness of air, congestion, and cough. She required O2 with her oxygen sats remaining in the upper 80s but improving today to mid 90s she has been on antibiotics blood cultures have been negative. She has been febrile. Knee pain is severe with any ROM. Additionally she is exquisitely tender to the touch about the knee. Typically uses a walker at baseline. Son is at bedside tonight. Denies pain elsewhere. In other extremities.     MEDICAL HISTORY  Past Medical History:   Diagnosis Date    Cancer     right kidney    Chronic kidney disease     Hypertension     Low back pain     Osteoarthritis     Peripheral neuropathy      Past Surgical History:   Procedure Laterality Date    BACK SURGERY  2021    EPIDURAL BLOCK      KIDNEY SURGERY Right 03/08/2017    REMOVED RIGHT KIDNEY    LUMBAR DISCECTOMY FUSION INSTRUMENTATION N/A 06/09/2017    Procedure: 1 LEVEL LAMINECTOMY DECOMPRESSION L4 5 EXCISION FACET CYST;  Surgeon: Negrito Oconnell DO;  Location: [facility] OR;  Service:     TUBAL ABDOMINAL LIGATION      WISDOM TOOTH EXTRACTION       Prior to Admission medications    Medication Sig Start Date End Date Taking? Authorizing Provider   Alcohol Swabs (Pharmacist Choice Alcohol) pads 1 each 2 (Two) Times a Day. 5/4/22   Danna Cabral MD   amLODIPine (NORVASC) 5 MG tablet Take 1 tablet by mouth Daily. 2/21/24   Provider, MD Jeni   atorvastatin (LIPITOR) 10 MG tablet TAKE 1 TABLET EVERY DAY 9/4/24   Danna Cabral MD  "  Cholecalciferol (VITAMIN D3) 5000 UNITS tablet Take 1 tablet by mouth Daily.    Jeni Coates MD   DULoxetine (CYMBALTA) 60 MG capsule TAKE 1 CAPSULE EVERY DAY 1/15/24   Danna Cabral MD   EPINEPHrine (EPIPEN) 0.3 MG/0.3ML solution auto-injector injection 0.3 mL Daily As Needed. 2/12/16   Jeni Coates MD   glucose blood (Pharmacist Choice Autocode) test strip 1 each by Other route 2 (Two) Times a Day. Use as instructed 12/6/23   Danna Cabral MD   Immune Globulin, Human, 40 GM/400ML solution 40 g Every 30 (Thirty) Days. 9/3/15   Jeni Coates MD   metoprolol succinate XL (TOPROL-XL) 25 MG 24 hr tablet TAKE 1 TABLET EVERY DAY 9/30/24   Danna Cabral MD   olmesartan (BENICAR) 40 MG tablet Take 1 tablet by mouth Daily. 9/23/24   Danna Cabral MD   omeprazole (priLOSEC) 40 MG capsule TAKE 1 CAPSULE EVERY DAY 9/16/24   Danna Cabral MD   Pharmacist Choice Lancets misc Use 1 each 2 (Two) Times a Day. 12/6/23   Danna Cabral MD   Tirzepatide (Mounjaro) 15 MG/0.5ML solution pen-injector pen Inject 0.5 mL under the skin into the appropriate area as directed 1 (One) Time Per Week. 8/29/24   Danna Cabral MD   traMADol (ULTRAM) 50 MG tablet TAKE ONE TABLET BY MOUTH EVERY 8 HOURS AS NEEDED FOR MODERATE PAIN 11/21/23   Danna Cabral MD   Xarelto 20 MG tablet Take 1 tablet by mouth Daily. 5/23/23   Marty Nava MD     Allergies   Allergen Reactions    Ibuprofen Itching and Other (See Comments)     tachycardia     Most Recent Immunizations   Administered Date(s) Administered    COVID-19 (PFIZER) Purple Cap Monovalent 11/26/2021     Social History     Tobacco Use    Smoking status: Never    Smokeless tobacco: Never   Substance Use Topics    Alcohol use: Not Currently     Comment: socially      Social History     Substance and Sexual Activity   Drug Use No    Types: Hydrocodone       VITALS: /62 (BP Location: Right arm)   Pulse 92   Temp 98.3 °F (36.8 °C)   Resp 15   Ht 170.2 cm (67\")   Wt 92.5 kg (204 " lb)   SpO2 93%   BMI 31.95 kg/m²  Body mass index is 31.95 kg/m².    PHYSICAL EXAM:   CONSTITUTIONAL: No acute distress  LUNGS: Equal chest rise, no shortness of air  CARDIOVASCULAR: palpable peripheral pulses  SKIN: no skin lesions in the area examined  LYMPH: no lymphadenopathy in the area examined  Musculoskeletal:   Left knee   Moderate effusion   Globally tender to palpation   SLR is intact   ROM 20 short of full to 35 painful.   Able to DF PF at the ankle   Sensation is intact distally to dense palpation   Foot is wwp     Ranges all other joints through their physiologic ROM with no pain.     RADIOLOGY REVIEW:   Knee radiographs reviewed with severe degenerative tricompartmental osteoarthritis. Patella height appropriate. No obvious fracture, no dislocation.     XR Knee 3 View Bilateral    Result Date: 2/10/2025  1. Moderately severe degenerative arthritis of both knees. 2. No fractures are seen.   This report was finalized on 2/10/2025 7:16 AM by Dr. Ramiro Groves M.D on Workstation: [email or web address]      XR Chest 1 View    Result Date: 2/10/2025  1. Mild atelectasis or pneumonia of the right lung base.   This report was finalized on 2/10/2025 7:12 AM by Dr. Ramiro Groves M.D on Workstation: [email or web address]       LABS:   Results for the past 24 hours:   Recent Results (from the past 24 hours)   Comprehensive Metabolic Panel    Collection Time: 02/10/25  3:57 AM    Specimen: Arm, Right; Blood   Result Value Ref Range    Glucose 234 (H) 65 - 99 mg/dL    BUN 56 (H) 8 - 23 mg/dL    Creatinine 1.73 (H) 0.57 - 1.00 mg/dL    Sodium 136 136 - 145 mmol/L    Potassium 4.8 3.5 - 5.2 mmol/L    Chloride 104 98 - 107 mmol/L    CO2 14.5 (L) 22.0 - 29.0 mmol/L    Calcium 8.8 8.6 - 10.5 mg/dL    Total Protein 7.2 6.0 - 8.5 g/dL    Albumin 3.2 (L) 3.5 - 5.2 g/dL    ALT (SGPT) 108 (H) 1 - 33 U/L    AST (SGOT) 77 (H) 1 - 32 U/L    Alkaline Phosphatase 84 39 - 117 U/L    Total Bilirubin 0.3 0.0 - 1.2 mg/dL    Globulin 4.0 gm/dL     A/G Ratio 0.8 g/dL    BUN/Creatinine Ratio 32.4 (H) 7.0 - 25.0    Anion Gap 17.5 (H) 5.0 - 15.0 mmol/L    eGFR 30.7 (L) >60.0 mL/min/1.73   BNP    Collection Time: 02/10/25  3:57 AM    Specimen: Arm, Right; Blood   Result Value Ref Range    proBNP 310.0 0.0 - 900.0 pg/mL   High Sensitivity Troponin T    Collection Time: 02/10/25  3:57 AM    Specimen: Arm, Right; Blood   Result Value Ref Range    HS Troponin T 57 (C) <14 ng/L   CBC Auto Differential    Collection Time: 02/10/25  3:57 AM    Specimen: Arm, Right; Blood   Result Value Ref Range    WBC 17.10 (H) 3.40 - 10.80 10*3/mm3    RBC 3.49 (L) 3.77 - 5.28 10*6/mm3    Hemoglobin 10.4 (L) 12.0 - 15.9 g/dL    Hematocrit 31.0 (L) 34.0 - 46.6 %    MCV 88.8 79.0 - 97.0 fL    MCH 29.8 26.6 - 33.0 pg    MCHC 33.5 31.5 - 35.7 g/dL    RDW 12.2 (L) 12.3 - 15.4 %    RDW-SD 39.4 37.0 - 54.0 fl    MPV 8.9 6.0 - 12.0 fL    Platelets 420 140 - 450 10*3/mm3    Neutrophil % 84.8 (H) 42.7 - 76.0 %    Lymphocyte % 4.9 (L) 19.6 - 45.3 %    Monocyte % 9.1 5.0 - 12.0 %    Eosinophil % 0.1 (L) 0.3 - 6.2 %    Basophil % 0.2 0.0 - 1.5 %    Immature Grans % 0.9 (H) 0.0 - 0.5 %    Neutrophils, Absolute 14.50 (H) 1.70 - 7.00 10*3/mm3    Lymphocytes, Absolute 0.84 0.70 - 3.10 10*3/mm3    Monocytes, Absolute 1.56 (H) 0.10 - 0.90 10*3/mm3    Eosinophils, Absolute 0.01 0.00 - 0.40 10*3/mm3    Basophils, Absolute 0.03 0.00 - 0.20 10*3/mm3    Immature Grans, Absolute 0.16 (H) 0.00 - 0.05 10*3/mm3    nRBC 0.0 0.0 - 0.2 /100 WBC   Lactic Acid, Plasma    Collection Time: 02/10/25  3:57 AM    Specimen: Arm, Right; Blood   Result Value Ref Range    Lactate 1.2 0.5 - 2.0 mmol/L   Procalcitonin    Collection Time: 02/10/25  3:57 AM    Specimen: Arm, Right; Blood   Result Value Ref Range    Procalcitonin 0.95 (H) 0.00 - 0.25 ng/mL   Respiratory Panel PCR w/COVID-19(SARS-CoV-2) [facility] In-House, NP Swab in [facility], 2 HR TAT - Swab, Nasopharynx    Collection Time: 02/10/25  4:01 AM     Specimen: Nasopharynx; Swab   Result Value Ref Range    ADENOVIRUS, PCR Not Detected Not Detected    Coronavirus 229E Not Detected Not Detected    Coronavirus HKU1 Not Detected Not Detected    Coronavirus NL63 Not Detected Not Detected    Coronavirus OC43 Not Detected Not Detected    COVID19 Not Detected Not Detected - Ref. Range    Human Metapneumovirus Not Detected Not Detected    Human Rhinovirus/Enterovirus Not Detected Not Detected    Influenza A PCR Equivocal (A) Not Detected    Influenza B PCR Not Detected Not Detected    Parainfluenza Virus 1 Not Detected Not Detected    Parainfluenza Virus 2 Not Detected Not Detected    Parainfluenza Virus 3 Not Detected Not Detected    Parainfluenza Virus 4 Not Detected Not Detected    RSV, PCR Not Detected Not Detected    Bordetella pertussis pcr Not Detected Not Detected    Bordetella parapertussis PCR Not Detected Not Detected    Chlamydophila pneumoniae PCR Not Detected Not Detected    Mycoplasma pneumo by PCR Not Detected Not Detected   ECG 12 Lead Dyspnea    Collection Time: 02/10/25  4:11 AM   Result Value Ref Range    QT Interval 370 ms    QTC Interval 447 ms   High Sensitivity Troponin T 1Hr    Collection Time: 02/10/25  5:00 AM    Specimen: Arm, Left; Blood   Result Value Ref Range    HS Troponin T 59 (C) <14 ng/L    Troponin T Numeric Delta 2 ng/L    Troponin T % Delta 4 Abnormal if >/= 20%   Urinalysis With Microscopic If Indicated (No Culture) - Urine, Clean Catch    Collection Time: 02/10/25  5:02 AM    Specimen: Urine, Clean Catch   Result Value Ref Range    Color, UA Yellow Yellow, Straw    Appearance, UA Cloudy (A) Clear    pH, UA 5.5 5.0 - 8.0    Specific Gravity, UA 1.021 1.005 - 1.030    Glucose, UA >=1000 mg/dL (3+) (A) Negative    Ketones, UA 15 mg/dL (1+) (A) Negative    Bilirubin, UA Negative Negative    Blood, UA Trace (A) Negative    Protein, UA 30 mg/dL (1+) (A) Negative    Leuk Esterase, UA Small (1+) (A) Negative    Nitrite, UA Negative  Negative    Urobilinogen, UA 1.0 E.U./dL 0.2 - 1.0 E.U./dL   Urinalysis, Microscopic Only - Urine, Clean Catch    Collection Time: 02/10/25  5:02 AM    Specimen: Urine, Clean Catch   Result Value Ref Range    RBC, UA 3-5 (A) None Seen, 0-2 /HPF    WBC, UA 21-50 (A) None Seen, 0-2 /HPF    Bacteria, UA 4+ (A) None Seen /HPF    Squamous Epithelial Cells, UA 7-12 (A) None Seen, 0-2 /HPF    Hyaline Casts, UA 0-2 None Seen /LPF    Methodology Automated Microscopy    POC Glucose Once    Collection Time: 02/10/25  7:40 AM    Specimen: Blood   Result Value Ref Range    Glucose 199 (H) 70 - 130 mg/dL   Basic Metabolic Panel    Collection Time: 02/10/25  7:48 AM    Specimen: Blood   Result Value Ref Range    Glucose 209 (H) 65 - 99 mg/dL    BUN 53 (H) 8 - 23 mg/dL    Creatinine 1.58 (H) 0.57 - 1.00 mg/dL    Sodium 137 136 - 145 mmol/L    Potassium 4.5 3.5 - 5.2 mmol/L    Chloride 105 98 - 107 mmol/L    CO2 15.2 (L) 22.0 - 29.0 mmol/L    Calcium 8.6 8.6 - 10.5 mg/dL    BUN/Creatinine Ratio 33.5 (H) 7.0 - 25.0    Anion Gap 16.8 (H) 5.0 - 15.0 mmol/L    eGFR 34.2 (L) >60.0 mL/min/1.73   CBC (No Diff)    Collection Time: 02/10/25  7:48 AM    Specimen: Blood   Result Value Ref Range    WBC 16.07 (H) 3.40 - 10.80 10*3/mm3    RBC 3.50 (L) 3.77 - 5.28 10*6/mm3    Hemoglobin 10.3 (L) 12.0 - 15.9 g/dL    Hematocrit 31.0 (L) 34.0 - 46.6 %    MCV 88.6 79.0 - 97.0 fL    MCH 29.4 26.6 - 33.0 pg    MCHC 33.2 31.5 - 35.7 g/dL    RDW 12.2 (L) 12.3 - 15.4 %    RDW-SD 38.6 37.0 - 54.0 fl    MPV 8.8 6.0 - 12.0 fL    Platelets 411 140 - 450 10*3/mm3   High Sensitivity Troponin T    Collection Time: 02/10/25  7:48 AM    Specimen: Blood   Result Value Ref Range    HS Troponin T 62 (C) <14 ng/L   POC Glucose Once    Collection Time: 02/10/25 11:43 AM    Specimen: Blood   Result Value Ref Range    Glucose 229 (H) 70 - 130 mg/dL       IMPRESSION:  Patient is a 74 y.o. female with suspected left knee septic arthritis in the setting of likely bacterial  pneumonia and UTI     PLAN:   Admited to: Jed Magdaleno MD  NWB  NPO @[place]   Hold anticoagulation   Pain control   Ice and elevation as needed     --left knee aspirated under semi sterile conditions with prep and use of superolateral aspiration site. 30 cc of purulent fluid was aspirated from the knee and sent to the lab for cultures, crystals, cell count with diff.     --cultures have yet to result but she has been on antibiotics. She does have uric acid crystals in the joint with 59k nucleated cells. I feel that in the setting of bacterial pneumonia and UTI that this knee is more than likely infected. I have placed her on the OR schedule for irrigation and debridement of the left knee     Discussed risks and benefits with her and her son. Risks of surgery and conservative antibiotic treatment discussed. She has been on antibiotics and she has not improved regarding her knee pain. Risks of surgery bleeding, infection, continued chronic pain, chronic infection, wound healing issues, knee stiffness, damage to surrounding structures, need for further surgery. Risks of anesthesia discussed as well including heart attack, stroke, and even death. She wishes to proceed with left knee irrigation and debridement. All questions answered no guarantees given.     please call/chat  with any questions or concerns.    Arvind Ibarra MD   [facility]   [phone] - [place] Office  [phone] - [place] Office

## 2025-02-10 NOTE — ED PROVIDER NOTES
MD ATTESTATION NOTE    SHARED VISIT: This visit was performed by BOTH a physician and an APC. The substantive portion of the medical decision making was performed by this attesting physician who made or approved the management plan and takes responsibility for patient management. All studies documented in the APC note (if performed) were independently interpreted by me.    The SARI and I have discussed this patient's history, physical exam, MDM, and treatment plan.  I have reviewed the documentation and personally had a face to face interaction with the patient. The attached note describes my personal findings.      hCrissy Gutierrez is a 74 y.o. female who presents to the ED c/o acute generalized weakness and recent flu exposure.  Patient reports she has had some cough and congestion.  Also states that she had a fall a few days ago and has had bilateral knee pain since then.  Patient was apparently hypoxic for EMS.  Generally does not require oxygen.    On exam:  GENERAL: not distressed  HENT: nares patent  EYES: no scleral icterus  CV: regular rhythm, regular rate  RESPIRATORY: normal effort  ABDOMEN: soft  MUSCULOSKELETAL: no deformity  NEURO: alert, moves all extremities, follows commands  SKIN: warm, dry    Labs  Recent Results (from the past 24 hours)   Comprehensive Metabolic Panel    Collection Time: 02/10/25  3:57 AM    Specimen: Arm, Right; Blood   Result Value Ref Range    Glucose 234 (H) 65 - 99 mg/dL    BUN 56 (H) 8 - 23 mg/dL    Creatinine 1.73 (H) 0.57 - 1.00 mg/dL    Sodium 136 136 - 145 mmol/L    Potassium 4.8 3.5 - 5.2 mmol/L    Chloride 104 98 - 107 mmol/L    CO2 14.5 (L) 22.0 - 29.0 mmol/L    Calcium 8.8 8.6 - 10.5 mg/dL    Total Protein 7.2 6.0 - 8.5 g/dL    Albumin 3.2 (L) 3.5 - 5.2 g/dL    ALT (SGPT) 108 (H) 1 - 33 U/L    AST (SGOT) 77 (H) 1 - 32 U/L    Alkaline Phosphatase 84 39 - 117 U/L    Total Bilirubin 0.3 0.0 - 1.2 mg/dL    Globulin 4.0 gm/dL    A/G Ratio 0.8 g/dL    BUN/Creatinine Ratio  32.4 (H) 7.0 - 25.0    Anion Gap 17.5 (H) 5.0 - 15.0 mmol/L    eGFR 30.7 (L) >60.0 mL/min/1.73   BNP    Collection Time: 02/10/25  3:57 AM    Specimen: Arm, Right; Blood   Result Value Ref Range    proBNP 310.0 0.0 - 900.0 pg/mL   High Sensitivity Troponin T    Collection Time: 02/10/25  3:57 AM    Specimen: Arm, Right; Blood   Result Value Ref Range    HS Troponin T 57 (C) <14 ng/L   CBC Auto Differential    Collection Time: 02/10/25  3:57 AM    Specimen: Arm, Right; Blood   Result Value Ref Range    WBC 17.10 (H) 3.40 - 10.80 10*3/mm3    RBC 3.49 (L) 3.77 - 5.28 10*6/mm3    Hemoglobin 10.4 (L) 12.0 - 15.9 g/dL    Hematocrit 31.0 (L) 34.0 - 46.6 %    MCV 88.8 79.0 - 97.0 fL    MCH 29.8 26.6 - 33.0 pg    MCHC 33.5 31.5 - 35.7 g/dL    RDW 12.2 (L) 12.3 - 15.4 %    RDW-SD 39.4 37.0 - 54.0 fl    MPV 8.9 6.0 - 12.0 fL    Platelets 420 140 - 450 10*3/mm3    Neutrophil % 84.8 (H) 42.7 - 76.0 %    Lymphocyte % 4.9 (L) 19.6 - 45.3 %    Monocyte % 9.1 5.0 - 12.0 %    Eosinophil % 0.1 (L) 0.3 - 6.2 %    Basophil % 0.2 0.0 - 1.5 %    Immature Grans % 0.9 (H) 0.0 - 0.5 %    Neutrophils, Absolute 14.50 (H) 1.70 - 7.00 10*3/mm3    Lymphocytes, Absolute 0.84 0.70 - 3.10 10*3/mm3    Monocytes, Absolute 1.56 (H) 0.10 - 0.90 10*3/mm3    Eosinophils, Absolute 0.01 0.00 - 0.40 10*3/mm3    Basophils, Absolute 0.03 0.00 - 0.20 10*3/mm3    Immature Grans, Absolute 0.16 (H) 0.00 - 0.05 10*3/mm3    nRBC 0.0 0.0 - 0.2 /100 WBC   Lactic Acid, Plasma    Collection Time: 02/10/25  3:57 AM    Specimen: Arm, Right; Blood   Result Value Ref Range    Lactate 1.2 0.5 - 2.0 mmol/L   Procalcitonin    Collection Time: 02/10/25  3:57 AM    Specimen: Arm, Right; Blood   Result Value Ref Range    Procalcitonin 0.95 (H) 0.00 - 0.25 ng/mL   Respiratory Panel PCR w/COVID-19(SARS-CoV-2) [facility]-House, NP Swab in UTM/VTM, 2 HR TAT - Swab, Nasopharynx    Collection Time: 02/10/25  4:01 AM    Specimen: Nasopharynx; Swab   Result Value Ref  Range    ADENOVIRUS, PCR Not Detected Not Detected    Coronavirus 229E Not Detected Not Detected    Coronavirus HKU1 Not Detected Not Detected    Coronavirus NL63 Not Detected Not Detected    Coronavirus OC43 Not Detected Not Detected    COVID19 Not Detected Not Detected - Ref. Range    Human Metapneumovirus Not Detected Not Detected    Human Rhinovirus/Enterovirus Not Detected Not Detected    Influenza B PCR Not Detected Not Detected    Parainfluenza Virus 1 Not Detected Not Detected    Parainfluenza Virus 2 Not Detected Not Detected    Parainfluenza Virus 3 Not Detected Not Detected    Parainfluenza Virus 4 Not Detected Not Detected    RSV, PCR Not Detected Not Detected    Bordetella pertussis pcr Not Detected Not Detected    Bordetella parapertussis PCR Not Detected Not Detected    Chlamydophila pneumoniae PCR Not Detected Not Detected    Mycoplasma pneumo by PCR Not Detected Not Detected   ECG 12 Lead Dyspnea    Collection Time: 02/10/25  4:11 AM   Result Value Ref Range    QT Interval 370 ms    QTC Interval 447 ms   High Sensitivity Troponin T 1Hr    Collection Time: 02/10/25  5:00 AM    Specimen: Arm, Left; Blood   Result Value Ref Range    HS Troponin T 59 (C) <14 ng/L    Troponin T Numeric Delta 2 ng/L    Troponin T % Delta 4 Abnormal if >/= 20%   Urinalysis With Microscopic If Indicated (No Culture) - Urine, Clean Catch    Collection Time: 02/10/25  5:02 AM    Specimen: Urine, Clean Catch   Result Value Ref Range    Color, UA Yellow Yellow, Straw    Appearance, UA Cloudy (A) Clear    pH, UA 5.5 5.0 - 8.0    Specific Gravity, UA 1.021 1.005 - 1.030    Glucose, UA >=1000 mg/dL (3+) (A) Negative    Ketones, UA 15 mg/dL (1+) (A) Negative    Bilirubin, UA Negative Negative    Blood, UA Trace (A) Negative    Protein, UA 30 mg/dL (1+) (A) Negative    Leuk Esterase, UA Small (1+) (A) Negative    Nitrite, UA Negative Negative    Urobilinogen, UA 1.0 E.U./dL 0.2 - 1.0 E.U./dL   Urinalysis, Microscopic Only - Urine,  Clean Catch    Collection Time: 02/10/25  5:02 AM    Specimen: Urine, Clean Catch   Result Value Ref Range    RBC, UA 3-5 (A) None Seen, 0-2 /HPF    WBC, UA 21-50 (A) None Seen, 0-2 /HPF    Bacteria, UA 4+ (A) None Seen /HPF    Squamous Epithelial Cells, UA 7-12 (A) None Seen, 0-2 /HPF    Hyaline Casts, UA 0-2 None Seen /LPF    Methodology Automated Microscopy        Radiology  XR Knee 3 View Bilateral    Result Date: 2/10/2025  XR KNEE 3 VW BILATERAL-2/10/2025  HISTORY: Fell, bilateral knee pain.  There is bilateral tibiofemoral joint space narrowing most severe on the left. Hypertrophic changes are seen in both knees. Bilateral patellofemoral joint space narrowing is seen.  No fractures or other acute bony abnormalities are seen. Soft tissues are unremarkable.      1. Moderately severe degenerative arthritis of both knees. 2. No fractures are seen.   This report was finalized on 2/10/2025 7:16 AM by Dr. Ramiro Groves M.D on Workstation: [id number]      XR Chest 1 View    Result Date: 2/10/2025  XR CHEST 1 VW-2/10/2025  HISTORY: Hypoxia.  Heart size is within normal limits. There is some mild atelectasis or pneumonia of the right lung base. Right upper lung and left lung appear clear. Bony structures appear unremarkable.      1. Mild atelectasis or pneumonia of the right lung base.   This report was finalized on 2/10/2025 7:12 AM by Dr. Ramiro Groves M.D on Workstation: [id number]       Medications given in the ED:  Medications   azithromycin (ZITHROMAX) tablet 500 mg (has no administration in time range)   sodium chloride 0.9 % flush 10 mL (has no administration in time range)   sodium chloride 0.9 % flush 10 mL (has no administration in time range)   sodium chloride 0.9 % infusion 40 mL (has no administration in time range)   nitroglycerin (NITROSTAT) SL tablet 0.4 mg (has no administration in time range)   acetaminophen (TYLENOL) tablet 650 mg (has no administration in time range)     Or   acetaminophen  (TYLENOL) 160 MG/5ML oral solution 650 mg (has no administration in time range)     Or   acetaminophen (TYLENOL) suppository 650 mg (has no administration in time range)   sennosides-docusate (PERICOLACE) 8.6-50 MG per tablet 2 tablet (has no administration in time range)     And   polyethylene glycol (MIRALAX) packet 17 g (has no administration in time range)     And   bisacodyl (DULCOLAX) EC tablet 5 mg (has no administration in time range)     And   bisacodyl (DULCOLAX) suppository 10 mg (has no administration in time range)   ondansetron ODT (ZOFRAN-ODT) disintegrating tablet 4 mg (has no administration in time range)     Or   ondansetron (ZOFRAN) injection 4 mg (has no administration in time range)   calcium carbonate (TUMS) chewable tablet 500 mg (200 mg elemental) (has no administration in time range)   sodium chloride 0.9 % infusion (100 mL/hr Intravenous New Bag 2/10/25 0737)   ipratropium-albuterol (DUO-NEB) nebulizer solution 3 mL (has no administration in time range)   ipratropium-albuterol (DUO-NEB) nebulizer solution 3 mL (has no administration in time range)   cefTRIAXone (ROCEPHIN) 2,000 mg in sodium chloride 0.9 % 100 mL MBP (has no administration in time range)   lactated ringers bolus 1,000 mL (1,000 mL Intravenous New Bag 2/10/25 0533)   cefTRIAXone (ROCEPHIN) 2,000 mg in sodium chloride 0.9 % 100 mL MBP (0 mg Intravenous Stopped 2/10/25 0610)   guaiFENesin-codeine (ROMILAR-AC) solution 5 mL (5 mL Oral Given 2/10/25 0602)       Orders placed during this visit:  Orders Placed This Encounter   Procedures    Respiratory Panel PCR w/COVID-19(SARS-CoV-2) [facility] In-[facility], NP Swab in UTM/VTM, 2 HR TAT - Swab, Nasopharynx    Blood Culture - Blood,    Blood Culture - Blood,    XR Chest 1 View    XR Knee 3 View Bilateral    Comprehensive Metabolic Panel    BNP    High Sensitivity Troponin T    Urinalysis With Microscopic If Indicated (No Culture) - Urine, Clean Catch    CBC Auto  Differential    Lactic Acid, Plasma    Procalcitonin    High Sensitivity Troponin T 1Hr    Urinalysis, Microscopic Only - Urine, Clean Catch    Urinalysis With Culture If Indicated -    Basic Metabolic Panel    CBC (No Diff)    High Sensitivity Troponin T    Diet: Cardiac; Healthy Heart (2-3 Na+); Fluid Consistency: Thin (IDDSI 0)    Vital Signs    Intake & Output    Weigh Patient    Oral Care    Place Sequential Compression Device    Maintain Sequential Compression Device    Maintain IV Access    Telemetry - Place Orders & Notify Provider of Results When Patient Experiences Acute Chest Pain, Dysrhythmia or Respiratory Distress    May Be Off Telemetry for Tests    Code Status and Medical Interventions: CPR (Attempt to Resuscitate); Full Support    [facility] (on-call MD unless specified) Details    Incentive Spirometry    ECG 12 Lead Dyspnea    Insert Peripheral IV    Inpatient Admission    CBC & Differential       Medical Decision Making:  ED Course as of 02/10/25 0738   Mon Feb 10, 2025   0335 I discussed the case with Dr. Ledesma and he agrees to evaluate the patient at the bedside.    [CC]   0418 WBC(!): 17.10 [CC]   0442 Creatinine(!): 1.73  Will hydrate [CC]   0442 Anion Gap(!): 17.5 [CC]   0442 ALT (SGPT)(!): 108 [CC]   0442 AST (SGOT)(!): 77 [CC]   0442 Lactate: 1.2 [CC]   0458 Procalcitonin(!): 0.95 [CC]   0458 HS Troponin T(!!): 57 [CC]   0458 XR Chest 1 View  Independent interpretation of the chest x-ray is right lower lobe pneumonia [CC]   0529 Spoke with KAREN Butcher with [facility].  Reviewed history, exam, results, treatments.  She agrees admit the patient to Dr. Centeno. Chest XR, knee XR, and respiratory panel pending at the time of admission    [CC]   0537 Bacteria, UA(!): 4+ [CC]   0537 WBC, UA(!): 21-50 [CC]   0547 Troponin T Numeric Delta: 2  Flat, patient is not having any chest pain.  [CC]      ED Course User Index  [CC] Edelmira Siddiqi, MELISSA       Clinical Scores:                                    Differential diagnosis:  My differential diagnosis for dyspnea includes but is not limited to:  Asthma, COPD, pneumonia, pulmonary embolus, acute respiratory distress syndrome, pneumothorax, pleural effusion, pulmonary fibrosis, congestive heart failure, myocardial infarction, DKA, uremia, acidosis, sepsis, anemia, drug related, hyperventilation, CNS disease      Diagnosis  Final diagnoses:   Pneumonia of right lower lobe due to infectious organism   Acute respiratory failure with hypoxia   Acute pain of both knees   Generalized weakness   SONI (acute kidney injury)   Acute UTI          Jai Ledesma MD  02/10/25 0537       Jai Ledesma MD  02/10/25 0745

## 2025-02-10 NOTE — H&P
Patient Name:  Chrissy Gutierrez  YOB: 1950  MRN:  [id number]  Admit Date:  2/10/2025  Patient Care Team:  Jai Steven MD as PCP - General (Emergency Medicine)      Subjective   History Present Illness     Chief Complaint   Patient presents with   • Weakness - Generalized   • Flu Exposure       Ms. Gutierrez is a 74 y.o. female with past history of hypertension, diabetes mellitus type 2, CKD 3 who presented to hospital with primarily weakness and left knee pain.  She gets around with a walker at baseline.  She fell several days ago but did not fall onto her knee, more of a sitting fall down onto her buttock.  However since that time she has had increased pain in her left knee to the point where she cannot really walk well even while using her walker.  Over the same time she has been dealing with what she thought was a bad cold although she admits that she had a family member diagnosed with the flu about a week ago.  She has been sick at least that long, primarily with just upper respiratory congestion and some nonproductive cough.  She is not sure about any fevers.  Her oxygen sats here were in the mid to high 80s on room air and since then she has been titrated upward to 4 L to maintain sats only in the low 90s when I saw her.  She does not endorse any particular shortness of breath.  She denies any chest pain.  She is primarily asking for something to be done about her knee and is requesting a cortisone injection.      Review of Systems   Constitutional:  Negative for chills, fatigue and fever.   HENT:  Positive for congestion, rhinorrhea and sinus pressure. Negative for trouble swallowing.    Eyes:  Negative for visual disturbance.   Respiratory:  Positive for cough. Negative for chest tightness and shortness of breath.    Cardiovascular:  Negative for chest pain, palpitations and leg swelling.   Gastrointestinal:  Negative for abdominal distention, abdominal pain, constipation, diarrhea,  nausea and vomiting.   Genitourinary:  Negative for difficulty urinating, dysuria and frequency.   Musculoskeletal:  Positive for arthralgias (Left knee and to a lesser extent the right knee).   Skin:  Negative for rash.   Neurological:  Negative for dizziness and headaches.   Psychiatric/Behavioral:  Negative for confusion.         Personal History     Past Medical History:   Diagnosis Date   • Cancer     right kidney   • Chronic kidney disease    • Hypertension    • Low back pain    • Osteoarthritis    • Peripheral neuropathy      Past Surgical History:   Procedure Laterality Date   • BACK SURGERY  2021   • EPIDURAL BLOCK     • KIDNEY SURGERY Right 03/08/2017    REMOVED RIGHT KIDNEY   • LUMBAR DISCECTOMY FUSION INSTRUMENTATION N/A 06/09/2017    Procedure: 1 LEVEL LAMINECTOMY DECOMPRESSION L4 5 EXCISION FACET CYST;  Surgeon: Negrito Oconnell DO;  Location: [facility];  Service:    • TUBAL ABDOMINAL LIGATION     • WISDOM TOOTH EXTRACTION       History reviewed. No pertinent family history.  Social History     Tobacco Use   • Smoking status: Never   • Smokeless tobacco: Never   Vaping Use   • Vaping status: Never Used   Substance Use Topics   • Alcohol use: Not Currently     Comment: socially   • Drug use: No     Types: Hydrocodone     No current facility-administered medications on file prior to encounter.     Current Outpatient Medications on File Prior to Encounter   Medication Sig Dispense Refill   • acetaminophen (TYLENOL) 500 MG tablet Take 1 tablet by mouth Every 6 (Six) Hours As Needed for Mild Pain.     • amLODIPine-benazepril (LOTREL) 10-40 MG per capsule Take 1 capsule by mouth.     • aspirin 81 MG EC tablet Take 1 tablet by mouth Daily.     • carvedilol (COREG) 25 MG tablet Take 1 tablet by mouth.     • dapagliflozin Propanediol (Farxiga) 10 MG tablet Take 10 mg by mouth Daily.     • furosemide (LASIX) 20 MG tablet Take 1 tablet by mouth Daily.     • Garlic 400 MG tablet delayed-release Take 1 tablet by  mouth. On hold for sx.     • glimepiride (AMARYL) 2 MG tablet Take 1 tablet by mouth.     • hydrochlorothiazide (HYDRODIURIL) 25 MG tablet Take 1 tablet by mouth.     • insulin glargine (LANTUS, SEMGLEE) 100 UNIT/ML injection Inject 10 Units under the skin into the appropriate area as directed Daily.     • methocarbamol (ROBAXIN) 500 MG tablet Take 1 tablet by mouth 4 (Four) Times a Day As Needed for Muscle Spasms. 15 tablet 0   • Misc Natural Products (OSTEO BI-FLEX ADV DOUBLE ST PO) Take 2 tablets by mouth Daily.     • NIACIN PO Take 1 tablet by mouth Daily.     • Omega-3 Fatty Acids (FISH OIL CONCENTRATE PO) Take 2,000 mg by mouth Daily.     • rosuvastatin (CRESTOR) 10 MG tablet Take 1 tablet by mouth Daily.     • sodium bicarbonate 650 MG tablet Take 1 tablet by mouth Daily.     • sodium zirconium cyclosilicate (Lokelma) 5 g packet Take 5 g by mouth Every Other Day. Empty entire contents of the packet(s) into a glass with at least 3 tablespoons (45 mL) of water. Stir well and drink immediately; if powder remains in the glass, add water, stir and drink immediately; repeat until no powder remains. Administer other oral medications at least 2 hours before or 2 hours after dose.     • HYDROcodone-acetaminophen (NORCO) 5-325 MG per tablet Take 1 tablet by mouth Every 6 (Six) Hours As Needed (Pain). 40 tablet 0   • metFORMIN (GLUCOPHAGE) 500 MG tablet Take 1 tablet by mouth 2 (Two) Times a Day With Meals.     • Multiple Vitamins-Minerals (MULTIVITAMIN ADULT PO) Take 1 tablet by mouth Daily.     • simvastatin (ZOCOR) 20 MG tablet Take 1 tablet by mouth.     • [DISCONTINUED] predniSONE (DELTASONE) 20 MG tablet 2 tablets by mouth daily for 5 days 10 tablet 0     Allergies   Allergen Reactions   • Ibuprofen Itching and Other (See Comments)     tachycardia       Objective    Objective     Vital Signs  Temp:  [98.3 °F (36.8 °C)-98.7 °F (37.1 °C)] 98.6 °F (37 °C)  Heart Rate:  [92-94] 94  Resp:  [14-15] 15  BP:  (106-110)/(55-62) 106/55  SpO2:  [87 %-94 %] 90 %  on  Flow (L/min) (Oxygen Therapy):  [4] 4;   Device (Oxygen Therapy): nasal cannula  Body mass index is 31.95 kg/m².    Physical Exam  Vitals reviewed.   Constitutional:       General: She is not in acute distress.     Appearance: She is well-developed. She is obese. She is not ill-appearing.   HENT:      Head: Normocephalic and atraumatic.      Mouth/Throat:      Pharynx: No oropharyngeal exudate.   Eyes:      General: No scleral icterus.  Neck:      Vascular: No JVD.   Cardiovascular:      Rate and Rhythm: Normal rate and regular rhythm.      Heart sounds: Normal heart sounds. No murmur heard.  Pulmonary:      Effort: Pulmonary effort is normal. No respiratory distress.      Comments: Faint rhonchi right base  Abdominal:      General: Bowel sounds are normal. There is no distension.      Palpations: Abdomen is soft.      Tenderness: There is no abdominal tenderness.   Musculoskeletal:      Cervical back: Neck supple.      Right lower leg: No edema.      Left lower leg: No edema.      Comments: Left knee is tender to palpation with limited range of motion.  Slightly warm although she was under 6 blankets when I saw her so difficult to interpret.   Lymphadenopathy:      Cervical: No cervical adenopathy.   Skin:     General: Skin is warm and dry.      Coloration: Skin is not pale.   Neurological:      Mental Status: She is alert and oriented to person, place, and time.   Psychiatric:         Mood and Affect: Mood normal.         Results Review:  I reviewed the patient's new clinical results.  I reviewed the patient's new imaging results and agree with the interpretation.  I reviewed the patient's other test results and agree with the interpretation  I personally viewed and interpreted the patient's EKG/Telemetry data  Discussed with ED provider.    Lab Results (last 24 hours)       Procedure Component Value Units Date/Time    CBC & Differential [[id number]]   (Abnormal) Collected: 02/10/25 0357    Specimen: Blood from Arm, Right Updated: 02/10/25 0416    Narrative:      The following orders were created for panel order CBC & Differential.  Procedure                               Abnormality         Status                     ---------                               -----------         ------                     CBC Auto Differential[[id number]]        Abnormal            Final result                 Please view results for these tests on the individual orders.    Comprehensive Metabolic Panel [[id number]]  (Abnormal) Collected: 02/10/25 0357    Specimen: Blood from Arm, Right Updated: 02/10/25 0438     Glucose 234 mg/dL      BUN 56 mg/dL      Creatinine 1.73 mg/dL      Sodium 136 mmol/L      Potassium 4.8 mmol/L      Chloride 104 mmol/L      CO2 14.5 mmol/L      Calcium 8.8 mg/dL      Total Protein 7.2 g/dL      Albumin 3.2 g/dL      ALT (SGPT) 108 U/L      AST (SGOT) 77 U/L      Alkaline Phosphatase 84 U/L      Total Bilirubin 0.3 mg/dL      Globulin 4.0 gm/dL      A/G Ratio 0.8 g/dL      BUN/Creatinine Ratio 32.4     Anion Gap 17.5 mmol/L      eGFR 30.7 mL/min/1.73     Narrative:      GFR Categories in Chronic Kidney Disease (CKD)      GFR Category          GFR (mL/min/1.73)    Interpretation  G1                     90 or greater         Normal or high (1)  G2                      60-89                Mild decrease (1)  G3a                   45-59                Mild to moderate decrease  G3b                   30-44                Moderate to severe decrease  G4                    15-29                Severe decrease  G5                    14 or less           Kidney failure          (1)In the absence of evidence of kidney disease, neither GFR category G1 or G2 fulfill the criteria for CKD.    eGFR calculation 2021 CKD-EPI creatinine equation, which does not include race as a factor    BNP [[id number]]  (Normal) Collected: 02/10/25 0357    Specimen: Blood from Arm, Right  Updated: 02/10/25 0443     proBNP 310.0 pg/mL     Narrative:      This assay is used as an aid in the diagnosis of individuals suspected of having heart failure. It can be used as an aid in the diagnosis of acute decompensated heart failure (ADHF) in patients presenting with signs and symptoms of ADHF to the emergency department (ED). In addition, NT-proBNP of <300 pg/mL indicates ADHF is not likely.    Age Range Result Interpretation  NT-proBNP Concentration (pg/mL:      <50             Positive            >450                   Gray                 300-450                    Negative             <300    50-75           Positive            >900                  Gray                300-900                  Negative            <300      >75             Positive            >1800                  Gray                300-1800                  Negative            <300    High Sensitivity Troponin T [[id number]]  (Abnormal) Collected: 02/10/25 0357    Specimen: Blood from Arm, Right Updated: 02/10/25 0446     HS Troponin T 57 ng/L     Narrative:      High Sensitive Troponin T Reference Range:  <14.0 ng/L- Negative Female for AMI  <22.0 ng/L- Negative Male for AMI  >=14 - Abnormal Female indicating possible myocardial injury.  >=22 - Abnormal Male indicating possible myocardial injury.   Clinicians would have to utilize clinical acumen, EKG, Troponin, and serial changes to determine if it is an Acute Myocardial Infarction or myocardial injury due to an underlying chronic condition.         CBC Auto Differential [[id number]]  (Abnormal) Collected: 02/10/25 0357    Specimen: Blood from Arm, Right Updated: 02/10/25 0416     WBC 17.10 10*3/mm3      RBC 3.49 10*6/mm3      Hemoglobin 10.4 g/dL      Hematocrit 31.0 %      MCV 88.8 fL      MCH 29.8 pg      MCHC 33.5 g/dL      RDW 12.2 %      RDW-SD 39.4 fl      MPV 8.9 fL      Platelets 420 10*3/mm3      Neutrophil % 84.8 %      Lymphocyte % 4.9 %      Monocyte % 9.1 %       "Eosinophil % 0.1 %      Basophil % 0.2 %      Immature Grans % 0.9 %      Neutrophils, Absolute 14.50 10*3/mm3      Lymphocytes, Absolute 0.84 10*3/mm3      Monocytes, Absolute 1.56 10*3/mm3      Eosinophils, Absolute 0.01 10*3/mm3      Basophils, Absolute 0.03 10*3/mm3      Immature Grans, Absolute 0.16 10*3/mm3      nRBC 0.0 /100 WBC     Lactic Acid, Plasma [[id number]]  (Normal) Collected: 02/10/25 0357    Specimen: Blood from Arm, Right Updated: 02/10/25 0433     Lactate 1.2 mmol/L     Procalcitonin [[id number]]  (Abnormal) Collected: 02/10/25 0357    Specimen: Blood from Arm, Right Updated: 02/10/25 0443     Procalcitonin 0.95 ng/mL     Narrative:      As a Marker for Sepsis (Non-Neonates):    1. <0.5 ng/mL represents a low risk of severe sepsis and/or septic shock.  2. >2 ng/mL represents a high risk of severe sepsis and/or septic shock.    As a Marker for Lower Respiratory Tract Infections that require antibiotic therapy:    PCT on Admission    Antibiotic Therapy       6-12 Hrs later    >0.5                Strongly Recommended  >0.25 - <0.5        Recommended   0.1 - 0.25          Discouraged              Remeasure/reassess PCT  <0.1                Strongly Discouraged     Remeasure/reassess PCT    As 28 day mortality risk marker: \"Change in Procalcitonin Result\" (>80% or <=80%) if Day 0 (or Day 1) and Day 4 values are available. Refer to http://www.bras-pct-calculator.com    Change in PCT <=80%  A decrease of PCT levels below or equal to 80% defines a positive change in PCT test result representing a higher risk for 28-day all-cause mortality of patients diagnosed with severe sepsis for septic shock.    Change in PCT >80%  A decrease of PCT levels of more than 80% defines a negative change in PCT result representing a lower risk for 28-day all-cause mortality of patients diagnosed with severe sepsis or septic shock.       Respiratory Panel PCR w/COVID-19(SARS-CoV-2) [facility] In-House, NP Swab " in UTM/VTM, 2 HR TAT - Swab, Nasopharynx [[id number]]  (Abnormal) Collected: 02/10/25 0401    Specimen: Swab from Nasopharynx Updated: 02/10/25 0824     ADENOVIRUS, PCR Not Detected     Coronavirus 229E Not Detected     Coronavirus HKU1 Not Detected     Coronavirus NL63 Not Detected     Coronavirus OC43 Not Detected     COVID19 Not Detected     Human Metapneumovirus Not Detected     Human Rhinovirus/Enterovirus Not Detected     Influenza A PCR Equivocal     Comment: Appended report. These results have been appended to a previously preliminary verified report.        Influenza B PCR Not Detected     Parainfluenza Virus 1 Not Detected     Parainfluenza Virus 2 Not Detected     Parainfluenza Virus 3 Not Detected     Parainfluenza Virus 4 Not Detected     RSV, PCR Not Detected     Bordetella pertussis pcr Not Detected     Bordetella parapertussis PCR Not Detected     Chlamydophila pneumoniae PCR Not Detected     Mycoplasma pneumo by PCR Not Detected    Narrative:      In the setting of a positive respiratory panel with a viral infection PLUS a negative procalcitonin without other underlying concern for bacterial infection, consider observing off antibiotics or discontinuation of antibiotics and continue supportive care. If the respiratory panel is positive for atypical bacterial infection (Bordetella pertussis, Chlamydophila pneumoniae, or Mycoplasma pneumoniae), consider antibiotic de-escalation to target atypical bacterial infection.    Blood Culture - Blood, Arm, Left [[id number]] Collected: 02/10/25 0500    Specimen: Blood from Arm, Left Updated: 02/10/25 0512    High Sensitivity Troponin T 1Hr [[id number]]  (Abnormal) Collected: 02/10/25 0500    Specimen: Blood from Arm, Left Updated: 02/10/25 0541     HS Troponin T 59 ng/L      Troponin T Numeric Delta 2 ng/L      Troponin T % Delta 4    Narrative:      High Sensitive Troponin T Reference Range:  <14.0 ng/L- Negative Female for AMI  <22.0 ng/L- Negative Male for  AMI  >=14 - Abnormal Female indicating possible myocardial injury.  >=22 - Abnormal Male indicating possible myocardial injury.   Clinicians would have to utilize clinical acumen, EKG, Troponin, and serial changes to determine if it is an Acute Myocardial Infarction or myocardial injury due to an underlying chronic condition.         Blood Culture - Blood, Arm, Left [[id number]] Collected: 02/10/25 0501    Specimen: Blood from Arm, Left Updated: 02/10/25 0512    Urinalysis With Microscopic If Indicated (No Culture) - Urine, Clean Catch [[id number]]  (Abnormal) Collected: 02/10/25 0502    Specimen: Urine, Clean Catch Updated: 02/10/25 0531     Color, UA Yellow     Appearance, UA Cloudy     pH, UA 5.5     Specific Gravity, UA 1.021     Glucose, UA >=1000 mg/dL (3+)     Ketones, UA 15 mg/dL (1+)     Bilirubin, UA Negative     Blood, UA Trace     Protein, UA 30 mg/dL (1+)     Leuk Esterase, UA Small (1+)     Nitrite, UA Negative     Urobilinogen, UA 1.0 E.U./dL    Urinalysis, Microscopic Only - Urine, Clean Catch [[id number]]  (Abnormal) Collected: 02/10/25 0502    Specimen: Urine, Clean Catch Updated: 02/10/25 0531     RBC, UA 3-5 /HPF      WBC, UA 21-50 /HPF      Bacteria, UA 4+ /HPF      Squamous Epithelial Cells, UA 7-12 /HPF      Hyaline Casts, UA 0-2 /LPF      Methodology Automated Microscopy    POC Glucose Once [[id number]]  (Abnormal) Collected: 02/10/25 0740    Specimen: Blood Updated: 02/10/25 0743     Glucose 199 mg/dL     Basic Metabolic Panel [[id number]]  (Abnormal) Collected: 02/10/25 0748    Specimen: Blood Updated: 02/10/25 0821     Glucose 209 mg/dL      BUN 53 mg/dL      Creatinine 1.58 mg/dL      Sodium 137 mmol/L      Potassium 4.5 mmol/L      Chloride 105 mmol/L      CO2 15.2 mmol/L      Calcium 8.6 mg/dL      BUN/Creatinine Ratio 33.5     Anion Gap 16.8 mmol/L      eGFR 34.2 mL/min/1.73     Narrative:      GFR Categories in Chronic Kidney Disease (CKD)      GFR Category          GFR (mL/min/1.73)     Interpretation  G1                     90 or greater         Normal or high (1)  G2                      60-89                Mild decrease (1)  G3a                   45-59                Mild to moderate decrease  G3b                   30-44                Moderate to severe decrease  G4                    15-29                Severe decrease  G5                    14 or less           Kidney failure          (1)In the absence of evidence of kidney disease, neither GFR category G1 or G2 fulfill the criteria for CKD.    eGFR calculation 2021 CKD-EPI creatinine equation, which does not include race as a factor    CBC (No Diff) [[id number]]  (Abnormal) Collected: 02/10/25 0748    Specimen: Blood Updated: 02/10/25 0801     WBC 16.07 10*3/mm3      RBC 3.50 10*6/mm3      Hemoglobin 10.3 g/dL      Hematocrit 31.0 %      MCV 88.6 fL      MCH 29.4 pg      MCHC 33.2 g/dL      RDW 12.2 %      RDW-SD 38.6 fl      MPV 8.8 fL      Platelets 411 10*3/mm3     High Sensitivity Troponin T [[id number]]  (Abnormal) Collected: 02/10/25 0748    Specimen: Blood Updated: 02/10/25 0823     HS Troponin T 62 ng/L     Narrative:      High Sensitive Troponin T Reference Range:  <14.0 ng/L- Negative Female for AMI  <22.0 ng/L- Negative Male for AMI  >=14 - Abnormal Female indicating possible myocardial injury.  >=22 - Abnormal Male indicating possible myocardial injury.   Clinicians would have to utilize clinical acumen, EKG, Troponin, and serial changes to determine if it is an Acute Myocardial Infarction or myocardial injury due to an underlying chronic condition.         POC Glucose Once [[id number]]  (Abnormal) Collected: 02/10/25 1143    Specimen: Blood Updated: 02/10/25 1145     Glucose 229 mg/dL             Imaging Results (Last 24 Hours)       Procedure Component Value Units Date/Time    XR Knee 3 View Bilateral [[id number]] Collected: 02/10/25 0715     Updated: 02/10/25 0719    Narrative:      XR KNEE 3 VW BILATERAL-2/10/2025     HISTORY:  Fell, bilateral knee pain.     There is bilateral tibiofemoral joint space narrowing most severe on the  left. Hypertrophic changes are seen in both knees. Bilateral  patellofemoral joint space narrowing is seen.     No fractures or other acute bony abnormalities are seen. Soft tissues  are unremarkable.       Impression:      1. Moderately severe degenerative arthritis of both knees.  2. No fractures are seen.        This report was finalized on 2/10/2025 7:16 AM by Dr. Ramiro Groves M.D on Workstation: [id number]       XR Chest 1 View [[id number]] Collected: 02/10/25 0712     Updated: 02/10/25 0715    Narrative:      XR CHEST 1 VW-2/10/2025     HISTORY: Hypoxia.     Heart size is within normal limits. There is some mild atelectasis or  pneumonia of the right lung base. Right upper lung and left lung appear  clear. Bony structures appear unremarkable.       Impression:      1. Mild atelectasis or pneumonia of the right lung base.        This report was finalized on 2/10/2025 7:12 AM by Dr. Ramiro Groves M.D on Workstation: [id number]                   ECG 12 Lead Dyspnea   Final Result   HEART RATE=88  bpm   RR Fgsmomiy=161  ms   HI Eytmmpud=160  ms   P Horizontal Axis=16  deg   P Front Axis=73  deg   QRSD Interval=94  ms   QT Kuyfmhxh=569  ms   PRfB=428  ms   QRS Axis=-40  deg   T Wave Axis=55  deg   - OTHERWISE NORMAL ECG -   Sinus rhythm   Left axis deviation   Abnormal R-wave progression, early transition   NO PRIOR TRACING AVAILABLE FOR COMPARISON   Electronically Signed By: Gómez Robbins ([facility]) 2025-02-10 11:19:26   Date and Time of Study:2025-02-10 04:11:46           Assessment/Plan     Active Hospital Problems    Diagnosis  POA   • **Bacterial pneumonia [J15.9]  Yes   • Sepsis, unspecified organism [A41.9]  Yes   • Influenza A [J10.1]  Yes   • Acute pain of left knee [M25.562]  Yes   • Type 2 diabetes mellitus with hyperglycemia, with long-term current use of insulin [E11.65, Z79.4]  Not Applicable   •  CKD (chronic kidney disease) stage 3, GFR 30-59 ml/min [N18.30]  Yes      Resolved Hospital Problems   No resolved problems to display.       Ms. Gutierrez is a 74 y.o. female diabetic with history of CKD 3 who presents to the hospital with acute left knee pain of uncertain etiology along with hypoxia due to influenza with suspected secondary bacterial pneumonia    Suspected secondary bacterial pneumonia: Meets sepsis criteria with mild tachycardia and leukocytosis along with some pretty impressive hypoxia.  In fact hypoxia is a little out of proportion to what I might expect from the x-ray and had like to do a CT to follow-up further.  In the meantime continue antibiotics.  I do not think Tamiflu will do us any good since her influenza exposure was well over a week ago and she has been sick for that entire time.  Wean O2 as tolerated    Left knee pain: Will ask orthopedics to evaluate.  She does have arthritis on imaging.  Will check some inflammatory markers and uric acid.    For diabetes will resume her oral meds as well as Lantus.  Correctional insulin ordered as needed    Disposition/length of stay anticipated 2 to 3 days      VTE Prophylaxis -Lovenox.  Code Status - Full code.       Jed Magdaleno MD  [facility]  02/10/25  12:53 EST

## 2025-02-10 NOTE — ED NOTES
Patient to ED via EMS from home for the initial report of generalized weakness. Patient's son tested positive for the flu. Patient has a cough. Patient denies fever. When EMS arrived, patient was 88% on room air. Patient was also hypotensive (80's/40's) with EMS prior to receiving fluids.    Patient had a fall 2 days ago. Did not hit her head, no thinners, no LOC. Patient complains of bilateral knee pain (especially the left one).

## 2025-02-10 NOTE — ED PROVIDER NOTES
EMERGENCY DEPARTMENT ENCOUNTER  Room Number:  F51/1  PCP: Jai Steven MD  Independent Historians: Patient and EMS      HPI:  Chief Complaint: had concerns including Weakness - Generalized and Flu Exposure.     A complete HPI/ROS/PMH/PSH/SH/FH are unobtainable due to: Poor historian    Chronic or social conditions impacting patient care (Social Determinants of Health): None      Context: Chrissy Gutierrez is a 74 y.o. female with a medical history of hypertension and CKD presents emergency department today complaining of bilateral knee pain.  Apparently EMS was called today because she could not walk because her knees hurt.  She has been sick with an upper respiratory infection over the last week and apparently had a fall a few days ago.  She says she did not believe she injured her knees during the fall but tonight could not walk.  On EMS arrival she was found to be both hypotensive and hypoxic.  She does not typically require oxygen.  She was given IV fluids en route and blood pressure improved.  She does report some generalized weakness.  Apparently she was exposed to the flu but has not been tested.  She does have a cough.  She denies fever or chills.  She denies nausea, vomiting or diarrhea.  She denies urinary symptoms.      Review of prior external notes (non-ED) -and- Review of prior external test results outside of this encounter:   I reviewed labs from 9/26/2024, hemoglobin 13.2,Creatinine 1.63, A1c 8.3      PAST MEDICAL HISTORY  Active Ambulatory Problems     Diagnosis Date Noted    Right lower lobe pneumonia 02/10/2025     Resolved Ambulatory Problems     Diagnosis Date Noted    No Resolved Ambulatory Problems     Past Medical History:   Diagnosis Date    Cancer     Chronic kidney disease     Hypertension     Low back pain     Osteoarthritis     Peripheral neuropathy          PAST SURGICAL HISTORY  Past Surgical History:   Procedure Laterality Date    BACK SURGERY  2021    EPIDURAL BLOCK       KIDNEY SURGERY Right 03/08/2017    REMOVED RIGHT KIDNEY    LUMBAR DISCECTOMY FUSION INSTRUMENTATION N/A 06/09/2017    Procedure: 1 LEVEL LAMINECTOMY DECOMPRESSION L4 5 EXCISION FACET CYST;  Surgeon: Negrito Oconnell DO;  Location: [facility];  Service:     TUBAL ABDOMINAL LIGATION      WISDOM TOOTH EXTRACTION           FAMILY HISTORY  History reviewed. No pertinent family history.      SOCIAL HISTORY  Social History     Socioeconomic History    Marital status:    Tobacco Use    Smoking status: Never    Smokeless tobacco: Never   Vaping Use    Vaping status: Never Used   Substance and Sexual Activity    Alcohol use: Not Currently     Comment: socially    Drug use: No     Types: Hydrocodone    Sexual activity: Defer         ALLERGIES  Ibuprofen      REVIEW OF SYSTEMS  Included in HPI  All systems reviewed and negative except for those discussed in HPI.      PHYSICAL EXAM    I have reviewed the triage vital signs and nursing notes.    ED Triage Vitals [02/10/25 0330]   Temp Heart Rate Resp BP SpO2   98.7 °F (37.1 °C) 92 14 109/58 94 %      Temp src Heart Rate Source Patient Position BP Location FiO2 (%)   Tympanic -- -- -- --       Physical Exam  Constitutional:       Appearance: Normal appearance. She is obese. She is ill-appearing.   HENT:      Head: Normocephalic and atraumatic.      Nose: Nose normal.      Mouth/Throat:      Mouth: Mucous membranes are moist.   Eyes:      Extraocular Movements: Extraocular movements intact.      Pupils: Pupils are equal, round, and reactive to light.   Cardiovascular:      Rate and Rhythm: Normal rate and regular rhythm.      Pulses: Normal pulses.      Heart sounds: Normal heart sounds.   Pulmonary:      Effort: Pulmonary effort is normal. No respiratory distress.      Breath sounds: Rales (bilateral lung bases) present.      Comments: Requiring 4 L of oxygen per nasal cannula  Abdominal:      General: Abdomen is flat. There is no distension.      Palpations: Abdomen is  soft.      Tenderness: There is no abdominal tenderness. There is no guarding or rebound.   Musculoskeletal:         General: Normal range of motion.      Cervical back: Normal range of motion and neck supple.      Comments: No obvious swelling or deformity to the bilateral knees but she does have pain with flexion of both the knees.  She is mildly tender to palpation around the patella.  No overlying erythema or warmth.  Calves are supple and nontender.  2+ DP pulse.  Light touch grossly intact distally.   Skin:     General: Skin is warm and dry.      Capillary Refill: Capillary refill takes less than 2 seconds.   Neurological:      General: No focal deficit present.      Mental Status: She is alert and oriented to person, place, and time.   Psychiatric:         Mood and Affect: Mood normal.         Behavior: Behavior normal.         LAB RESULTS  Recent Results (from the past 24 hours)   Comprehensive Metabolic Panel    Collection Time: 02/10/25  3:57 AM    Specimen: Arm, Right; Blood   Result Value Ref Range    Glucose 234 (H) 65 - 99 mg/dL    BUN 56 (H) 8 - 23 mg/dL    Creatinine 1.73 (H) 0.57 - 1.00 mg/dL    Sodium 136 136 - 145 mmol/L    Potassium 4.8 3.5 - 5.2 mmol/L    Chloride 104 98 - 107 mmol/L    CO2 14.5 (L) 22.0 - 29.0 mmol/L    Calcium 8.8 8.6 - 10.5 mg/dL    Total Protein 7.2 6.0 - 8.5 g/dL    Albumin 3.2 (L) 3.5 - 5.2 g/dL    ALT (SGPT) 108 (H) 1 - 33 U/L    AST (SGOT) 77 (H) 1 - 32 U/L    Alkaline Phosphatase 84 39 - 117 U/L    Total Bilirubin 0.3 0.0 - 1.2 mg/dL    Globulin 4.0 gm/dL    A/G Ratio 0.8 g/dL    BUN/Creatinine Ratio 32.4 (H) 7.0 - 25.0    Anion Gap 17.5 (H) 5.0 - 15.0 mmol/L    eGFR 30.7 (L) >60.0 mL/min/1.73   BNP    Collection Time: 02/10/25  3:57 AM    Specimen: Arm, Right; Blood   Result Value Ref Range    proBNP 310.0 0.0 - 900.0 pg/mL   High Sensitivity Troponin T    Collection Time: 02/10/25  3:57 AM    Specimen: Arm, Right; Blood   Result Value Ref Range    HS Troponin T 57 (C)  <14 ng/L   CBC Auto Differential    Collection Time: 02/10/25  3:57 AM    Specimen: Arm, Right; Blood   Result Value Ref Range    WBC 17.10 (H) 3.40 - 10.80 10*3/mm3    RBC 3.49 (L) 3.77 - 5.28 10*6/mm3    Hemoglobin 10.4 (L) 12.0 - 15.9 g/dL    Hematocrit 31.0 (L) 34.0 - 46.6 %    MCV 88.8 79.0 - 97.0 fL    MCH 29.8 26.6 - 33.0 pg    MCHC 33.5 31.5 - 35.7 g/dL    RDW 12.2 (L) 12.3 - 15.4 %    RDW-SD 39.4 37.0 - 54.0 fl    MPV 8.9 6.0 - 12.0 fL    Platelets 420 140 - 450 10*3/mm3    Neutrophil % 84.8 (H) 42.7 - 76.0 %    Lymphocyte % 4.9 (L) 19.6 - 45.3 %    Monocyte % 9.1 5.0 - 12.0 %    Eosinophil % 0.1 (L) 0.3 - 6.2 %    Basophil % 0.2 0.0 - 1.5 %    Immature Grans % 0.9 (H) 0.0 - 0.5 %    Neutrophils, Absolute 14.50 (H) 1.70 - 7.00 10*3/mm3    Lymphocytes, Absolute 0.84 0.70 - 3.10 10*3/mm3    Monocytes, Absolute 1.56 (H) 0.10 - 0.90 10*3/mm3    Eosinophils, Absolute 0.01 0.00 - 0.40 10*3/mm3    Basophils, Absolute 0.03 0.00 - 0.20 10*3/mm3    Immature Grans, Absolute 0.16 (H) 0.00 - 0.05 10*3/mm3    nRBC 0.0 0.0 - 0.2 /100 WBC   Lactic Acid, Plasma    Collection Time: 02/10/25  3:57 AM    Specimen: Arm, Right; Blood   Result Value Ref Range    Lactate 1.2 0.5 - 2.0 mmol/L   Procalcitonin    Collection Time: 02/10/25  3:57 AM    Specimen: Arm, Right; Blood   Result Value Ref Range    Procalcitonin 0.95 (H) 0.00 - 0.25 ng/mL   Respiratory Panel PCR w/COVID-19(SARS-CoV-2) [facility] In-House, NP Swab in UTM/VTM, 2 HR TAT - Swab, Nasopharynx    Collection Time: 02/10/25  4:01 AM    Specimen: Nasopharynx; Swab   Result Value Ref Range    ADENOVIRUS, PCR Not Detected Not Detected    Coronavirus 229E Not Detected Not Detected    Coronavirus HKU1 Not Detected Not Detected    Coronavirus NL63 Not Detected Not Detected    Coronavirus OC43 Not Detected Not Detected    COVID19 Not Detected Not Detected - Ref. Range    Human Metapneumovirus Not Detected Not Detected    Human Rhinovirus/Enterovirus Not Detected  Not Detected    Influenza A PCR Equivocal (A) Not Detected    Influenza B PCR Not Detected Not Detected    Parainfluenza Virus 1 Not Detected Not Detected    Parainfluenza Virus 2 Not Detected Not Detected    Parainfluenza Virus 3 Not Detected Not Detected    Parainfluenza Virus 4 Not Detected Not Detected    RSV, PCR Not Detected Not Detected    Bordetella pertussis pcr Not Detected Not Detected    Bordetella parapertussis PCR Not Detected Not Detected    Chlamydophila pneumoniae PCR Not Detected Not Detected    Mycoplasma pneumo by PCR Not Detected Not Detected   ECG 12 Lead Dyspnea    Collection Time: 02/10/25  4:11 AM   Result Value Ref Range    QT Interval 370 ms    QTC Interval 447 ms   High Sensitivity Troponin T 1Hr    Collection Time: 02/10/25  5:00 AM    Specimen: Arm, Left; Blood   Result Value Ref Range    HS Troponin T 59 (C) <14 ng/L    Troponin T Numeric Delta 2 ng/L    Troponin T % Delta 4 Abnormal if >/= 20%   Urinalysis With Microscopic If Indicated (No Culture) - Urine, Clean Catch    Collection Time: 02/10/25  5:02 AM    Specimen: Urine, Clean Catch   Result Value Ref Range    Color, UA Yellow Yellow, Straw    Appearance, UA Cloudy (A) Clear    pH, UA 5.5 5.0 - 8.0    Specific Gravity, UA 1.021 1.005 - 1.030    Glucose, UA >=1000 mg/dL (3+) (A) Negative    Ketones, UA 15 mg/dL (1+) (A) Negative    Bilirubin, UA Negative Negative    Blood, UA Trace (A) Negative    Protein, UA 30 mg/dL (1+) (A) Negative    Leuk Esterase, UA Small (1+) (A) Negative    Nitrite, UA Negative Negative    Urobilinogen, UA 1.0 E.U./dL 0.2 - 1.0 E.U./dL   Urinalysis, Microscopic Only - Urine, Clean Catch    Collection Time: 02/10/25  5:02 AM    Specimen: Urine, Clean Catch   Result Value Ref Range    RBC, UA 3-5 (A) None Seen, 0-2 /HPF    WBC, UA 21-50 (A) None Seen, 0-2 /HPF    Bacteria, UA 4+ (A) None Seen /HPF    Squamous Epithelial Cells, UA 7-12 (A) None Seen, 0-2 /HPF    Hyaline Casts, UA 0-2 None Seen /LPF     Methodology Automated Microscopy    POC Glucose Once    Collection Time: 02/10/25  7:40 AM    Specimen: Blood   Result Value Ref Range    Glucose 199 (H) 70 - 130 mg/dL   Basic Metabolic Panel    Collection Time: 02/10/25  7:48 AM    Specimen: Blood   Result Value Ref Range    Glucose 209 (H) 65 - 99 mg/dL    BUN 53 (H) 8 - 23 mg/dL    Creatinine 1.58 (H) 0.57 - 1.00 mg/dL    Sodium 137 136 - 145 mmol/L    Potassium 4.5 3.5 - 5.2 mmol/L    Chloride 105 98 - 107 mmol/L    CO2 15.2 (L) 22.0 - 29.0 mmol/L    Calcium 8.6 8.6 - 10.5 mg/dL    BUN/Creatinine Ratio 33.5 (H) 7.0 - 25.0    Anion Gap 16.8 (H) 5.0 - 15.0 mmol/L    eGFR 34.2 (L) >60.0 mL/min/1.73   CBC (No Diff)    Collection Time: 02/10/25  7:48 AM    Specimen: Blood   Result Value Ref Range    WBC 16.07 (H) 3.40 - 10.80 10*3/mm3    RBC 3.50 (L) 3.77 - 5.28 10*6/mm3    Hemoglobin 10.3 (L) 12.0 - 15.9 g/dL    Hematocrit 31.0 (L) 34.0 - 46.6 %    MCV 88.6 79.0 - 97.0 fL    MCH 29.4 26.6 - 33.0 pg    MCHC 33.2 31.5 - 35.7 g/dL    RDW 12.2 (L) 12.3 - 15.4 %    RDW-SD 38.6 37.0 - 54.0 fl    MPV 8.8 6.0 - 12.0 fL    Platelets 411 140 - 450 10*3/mm3   High Sensitivity Troponin T    Collection Time: 02/10/25  7:48 AM    Specimen: Blood   Result Value Ref Range    HS Troponin T 62 (C) <14 ng/L   Uric Acid    Collection Time: 02/10/25  7:48 AM    Specimen: Blood   Result Value Ref Range    Uric Acid 11.2 (H) 2.4 - 5.7 mg/dL   C-reactive Protein    Collection Time: 02/10/25  7:48 AM    Specimen: Blood   Result Value Ref Range    C-Reactive Protein 35.05 (H) 0.00 - 0.50 mg/dL   POC Glucose Once    Collection Time: 02/10/25 11:43 AM    Specimen: Blood   Result Value Ref Range    Glucose 229 (H) 70 - 130 mg/dL   POC Glucose Once    Collection Time: 02/10/25  4:34 PM    Specimen: Blood   Result Value Ref Range    Glucose 181 (H) 70 - 130 mg/dL   POC Glucose Once    Collection Time: 02/10/25  9:03 PM    Specimen: Blood   Result Value Ref Range    Glucose 158 (H) 70 - 130 mg/dL          RADIOLOGY  XR Knee 3 View Bilateral    Result Date: 2/10/2025  XR KNEE 3 VW BILATERAL-2/10/2025  HISTORY: Fell, bilateral knee pain.  There is bilateral tibiofemoral joint space narrowing most severe on the left. Hypertrophic changes are seen in both knees. Bilateral patellofemoral joint space narrowing is seen.  No fractures or other acute bony abnormalities are seen. Soft tissues are unremarkable.      1. Moderately severe degenerative arthritis of both knees. 2. No fractures are seen.   This report was finalized on 2/10/2025 7:16 AM by Dr. Ramiro Groves M.D on Workstation: [id number]      XR Chest 1 View    Result Date: 2/10/2025  XR CHEST 1 VW-2/10/2025  HISTORY: Hypoxia.  Heart size is within normal limits. There is some mild atelectasis or pneumonia of the right lung base. Right upper lung and left lung appear clear. Bony structures appear unremarkable.      1. Mild atelectasis or pneumonia of the right lung base.   This report was finalized on 2/10/2025 7:12 AM by Dr. Ramiro Groves M.D on Workstation: [id number]         MEDICATIONS GIVEN IN ER  Medications   sodium chloride 0.9 % flush 10 mL (10 mL Intravenous Given 2/10/25 2243)   sodium chloride 0.9 % flush 10 mL (has no administration in time range)   sodium chloride 0.9 % infusion 40 mL (has no administration in time range)   nitroglycerin (NITROSTAT) SL tablet 0.4 mg (has no administration in time range)   acetaminophen (TYLENOL) tablet 650 mg (650 mg Oral Given 2/11/25 0019)     Or   acetaminophen (TYLENOL) 160 MG/5ML oral solution 650 mg ( Oral Not Given:  See Alt 2/11/25 0019)     Or   acetaminophen (TYLENOL) suppository 650 mg ( Rectal Not Given:  See Alt 2/11/25 0019)   sennosides-docusate (PERICOLACE) 8.6-50 MG per tablet 2 tablet (has no administration in time range)     And   polyethylene glycol (MIRALAX) packet 17 g (has no administration in time range)     And   bisacodyl (DULCOLAX) EC tablet 5 mg (has no administration in time  range)     And   bisacodyl (DULCOLAX) suppository 10 mg (has no administration in time range)   ondansetron ODT (ZOFRAN-ODT) disintegrating tablet 4 mg (has no administration in time range)     Or   ondansetron (ZOFRAN) injection 4 mg (has no administration in time range)   calcium carbonate (TUMS) chewable tablet 500 mg (200 mg elemental) (has no administration in time range)   sodium chloride 0.9 % infusion (100 mL/hr Intravenous New Bag 2/10/25 1749)   ipratropium-albuterol (DUO-NEB) nebulizer solution 3 mL (has no administration in time range)   ipratropium-albuterol (DUO-NEB) nebulizer solution 3 mL (3 mL Nebulization Given 2/10/25 1955)   cefTRIAXone (ROCEPHIN) 2,000 mg in sodium chloride 0.9 % 100 mL MBP (has no administration in time range)   aspirin EC tablet 81 mg (81 mg Oral Given 2/10/25 1323)   amLODIPine (NORVASC) tablet 10 mg ( Oral Dose Auto Held 2/26/25 0900)     And   lisinopril (PRINIVIL,ZESTRIL) tablet 40 mg ( Oral Dose Auto Held 2/26/25 0900)   carvedilol (COREG) tablet 12.5 mg (12.5 mg Oral Given 2/10/25 2243)   empagliflozin (JARDIANCE) tablet 25 mg (25 mg Oral Given 2/10/25 1324)   glipizide (GLUCOTROL) tablet 5 mg (5 mg Oral Given 2/10/25 1324)   insulin glargine (LANTUS, SEMGLEE) injection 10 Units (10 Units Subcutaneous Given 2/10/25 1323)   methocarbamol (ROBAXIN) tablet 500 mg (has no administration in time range)   rosuvastatin (CRESTOR) tablet 10 mg (10 mg Oral Given 2/10/25 1324)   sodium bicarbonate tablet 650 mg (650 mg Oral Given 2/10/25 1323)   sodium zirconium cyclosilicate (LOKELMA) packet 5 g (has no administration in time range)   dextrose (GLUTOSE) oral gel 15 g (has no administration in time range)   dextrose (D50W) (25 g/50 mL) IV injection 25 g (has no administration in time range)   glucagon (GLUCAGEN) injection 1 mg (has no administration in time range)   insulin lispro (HUMALOG/ADMELOG) injection 2-9 Units ( Subcutaneous Not Given 2/10/25 2147)   Enoxaparin Sodium  (LOVENOX) syringe 40 mg (40 mg Subcutaneous Given 2/10/25 1323)   lactated ringers bolus 1,000 mL (1,000 mL Intravenous New Bag 2/10/25 0533)   cefTRIAXone (ROCEPHIN) 2,000 mg in sodium chloride 0.9 % 100 mL MBP (0 mg Intravenous Stopped 2/10/25 0610)   guaiFENesin-codeine (ROMILAR-AC) solution 5 mL (5 mL Oral Given 2/10/25 0602)           OUTPATIENT MEDICATION MANAGEMENT:  Current Facility-Administered Medications Ordered in Epic   Medication Dose Route Frequency Provider Last Rate Last Admin    acetaminophen (TYLENOL) tablet 650 mg  650 mg Oral Q4H PRN Guadalupe Lugo APRN   650 mg at 02/11/25 0019    Or    acetaminophen (TYLENOL) 160 MG/5ML oral solution 650 mg  650 mg Oral Q4H PRN Guadalupe Lugo APRN        Or    acetaminophen (TYLENOL) suppository 650 mg  650 mg Rectal Q4H PRN Guadalupe Lugo APRN        [Held by provider] amLODIPine (NORVASC) tablet 10 mg  10 mg Oral Q24H Jed Magdaleno MD        And    [Held by provider] lisinopril (PRINIVIL,ZESTRIL) tablet 40 mg  40 mg Oral Q24H Jed Magdaleno MD        aspirin EC tablet 81 mg  81 mg Oral Daily Jed Magdaleno MD   81 mg at 02/10/25 1323    sennosides-docusate (PERICOLACE) 8.6-50 MG per tablet 2 tablet  2 tablet Oral BID PRN Guadalupe Lugo APRN        And    polyethylene glycol (MIRALAX) packet 17 g  17 g Oral Daily PRN Guadalupe Lugo APRN        And    bisacodyl (DULCOLAX) EC tablet 5 mg  5 mg Oral Daily PRN Guadalupe Lugo APRN        And    bisacodyl (DULCOLAX) suppository 10 mg  10 mg Rectal Daily PRN Guadalupe Lugo APRN        calcium carbonate (TUMS) chewable tablet 500 mg (200 mg elemental)  2 tablet Oral BID PRN Guadalupe Lugo APRN        carvedilol (COREG) tablet 12.5 mg  12.5 mg Oral BID With Meals Jed Magdaleno MD   12.5 mg at 02/10/25 2243    cefTRIAXone (ROCEPHIN) 2,000 mg in sodium chloride 0.9 % 100 mL MBP  2,000 mg Intravenous Q24H Parish  PETER Whelan        dextrose (D50W) (25 g/50 mL) IV injection 25 g  25 g Intravenous Q15 Min PRN Jed Magdaleno MD        dextrose (GLUTOSE) oral gel 15 g  15 g Oral Q15 Min PRN Jed Magdaleno MD        empagliflozin (JARDIANCE) tablet 25 mg  25 mg Oral Daily Jed Magdaleno MD   25 mg at 02/10/25 1324    Enoxaparin Sodium (LOVENOX) syringe 40 mg  40 mg Subcutaneous Q24H Jed Magdaleno MD   40 mg at 02/10/25 1323    glipizide (GLUCOTROL) tablet 5 mg  5 mg Oral QAM AC Jed Magdaleno MD   5 mg at 02/10/25 1324    glucagon (GLUCAGEN) injection 1 mg  1 mg Intramuscular Q15 Min PRN Jed Magdaleno MD        insulin glargine (LANTUS, SEMGLEE) injection 10 Units  10 Units Subcutaneous Daily Jed Magdaleno MD   10 Units at 02/10/25 1323    insulin lispro (HUMALOG/ADMELOG) injection 2-9 Units  2-9 Units Subcutaneous 4x Daily AC & at Bedtime Jed Magdaleno MD   2 Units at 02/10/25 1847    ipratropium-albuterol (DUO-NEB) nebulizer solution 3 mL  3 mL Nebulization Q4H PRN Guadalupe Lugo APRN        ipratropium-albuterol (DUO-NEB) nebulizer solution 3 mL  3 mL Nebulization Q6H While Awake - RT Guadalupe Lugo APRN   3 mL at 02/10/25 1955    methocarbamol (ROBAXIN) tablet 500 mg  500 mg Oral 4x Daily PRN Jed Magdaleno MD        nitroglycerin (NITROSTAT) SL tablet 0.4 mg  0.4 mg Sublingual Q5 Min PRN Guadalupe Lugo APRN        ondansetron ODT (ZOFRAN-ODT) disintegrating tablet 4 mg  4 mg Oral Q6H PRN Guadalupe Lugo APRN        Or    ondansetron (ZOFRAN) injection 4 mg  4 mg Intravenous Q6H PRN Guadalupe Lugo APRN        rosuvastatin (CRESTOR) tablet 10 mg  10 mg Oral Daily Jed Magdaleno MD   10 mg at 02/10/25 1324    sodium bicarbonate tablet 650 mg  650 mg Oral Daily Jed Magdaleno MD   650 mg at 02/10/25 1323    sodium chloride 0.9 % flush 10 mL  10 mL  Intravenous Q12H Guadalupe Lugo APRN   10 mL at 02/10/25 2243    sodium chloride 0.9 % flush 10 mL  10 mL Intravenous PRN Guadalupe Lugo APRN        sodium chloride 0.9 % infusion 40 mL  40 mL Intravenous PRN Guadalupe Lugo APRN        sodium chloride 0.9 % infusion  100 mL/hr Intravenous Continuous Guadalupe Lugo APRN 100 mL/hr at 02/10/25 1749 100 mL/hr at 02/10/25 1749    sodium zirconium cyclosilicate (LOKELMA) packet 5 g  5 g Oral Every Other Day Jed Magdaleno MD         No current [facility]-ordered outpatient medications on file.         PROGRESS, DATA ANALYSIS, CONSULTS, AND MEDICAL DECISION MAKING  ORDERS PLACED DURING THIS VISIT:  Orders Placed This Encounter   Procedures    Respiratory Panel PCR w/COVID-19(SARS-CoV-2) [facility] In-House, NP Swab in UTM/VTM, 2 HR TAT - Swab, Nasopharynx    Blood Culture - Blood,    Blood Culture - Blood,    Urine Culture - Urine,    XR Chest 1 View    XR Knee 3 View Bilateral    CT Angiogram Chest    Comprehensive Metabolic Panel    BNP    High Sensitivity Troponin T    Urinalysis With Microscopic If Indicated (No Culture) - Urine, Clean Catch    CBC Auto Differential    Lactic Acid, Plasma    Procalcitonin    High Sensitivity Troponin T 1Hr    Urinalysis, Microscopic Only - Urine, Clean Catch    Basic Metabolic Panel    CBC (No Diff)    High Sensitivity Troponin T    CBC (No Diff)    Basic Metabolic Panel    Uric Acid    C-reactive Protein    Diet: Cardiac; Healthy Heart (2-3 Na+); Fluid Consistency: Thin (IDDSI 0)    Vital Signs    Intake & Output    Weigh Patient    Oral Care    Place Sequential Compression Device    Maintain Sequential Compression Device    Maintain IV Access    Telemetry - Place Orders & Notify Provider of Results When Patient Experiences Acute Chest Pain, Dysrhythmia or Respiratory Distress    May Be Off Telemetry for Tests    Code Status and Medical Interventions: CPR (Attempt to Resuscitate); Full  Support    CULLEN (on-call MD unless specified) Details    Inpatient Orthopedic Surgery Consult    PT Consult: Eval & Treat Functional Mobility Below Baseline    Incentive Spirometry    POC Glucose Once    POC Glucose Once    POC Glucose 4x Daily Before Meals & at Bedtime    POC Glucose Once    POC Glucose Once    ECG 12 Lead Dyspnea    Insert Peripheral IV    Inpatient Admission    CBC & Differential       All labs have been independently interpreted by me.  All radiology studies have been reviewed by me. All EKG's have been independently viewed and interpreted by me.  Discussion below represents my analysis of pertinent findings related to patient's condition, differential diagnosis, treatment plan and final disposition.    Differential diagnosis includes but is not limited to:   My differential diagnosis for generalized weakness includes but is not limited to:  Neuromuscular weakness   CVA  Hemorrhagic stroke  Multiple sclerosis  Amyotrophic Lateral Sclerosis (ALS) (UMN & LMN)  Spinal and bulbar muscular atrophy (Alfonso's syndrome)  Spinal cord disease: Infection (Epidural abscess)  Infarction/ischemia  Trauma (Spinal Cord Syndromes)  Inflammation (Transverse Myelitis)  Degenerative (Spinal muscular atrophy)  Tumor  Peripheral nerve disease: Guillain-Hines syndrome  Toxins ([place])  Tick paralysis  Diabetic peripheral neuropathy  NMJ disease: Myasthenia gravis crisis  Botulism  Organophosphate toxicity  Lambert-Eaton myasthenic syndrome  Rhabdomyolysis  Dermatomyositis  Polymyositis  Alcoholic myopathy  Non-neuromuscular weakness   ACS  Arrhythmia/Syncope  Severe infection/Sepsis  Hypoglycemia  Periodic paralysis (electrolyte disturbance, K, Mg, Ca)   Hypokalemic periodic paralysis  Thyrotoxic periodic paralysis  Respiratory failure  Symptomatic Anemia  Severe dehydration  Hypothyroidism  Polypharmacy  Malignancy          ED Course:  ED Course as of 02/11/25 0159   Mon Feb 10, 2025   9808 I discussed the case  with Dr. Ledesma and he agrees to evaluate the patient at the bedside.    [CC]   0418 WBC(!): 17.10 [CC]   0442 Creatinine(!): 1.73  Will hydrate [CC]   0442 Anion Gap(!): 17.5 [CC]   0442 ALT (SGPT)(!): 108 [CC]   0442 AST (SGOT)(!): 77 [CC]   0442 Lactate: 1.2 [CC]   0458 Procalcitonin(!): 0.95 [CC]   0458 HS Troponin T(!!): 57 [CC]   0458 XR Chest 1 View  Independent interpretation of the chest x-ray is right lower lobe pneumonia [CC]   0529 Spoke with Guadalupe, APC with [facility].  Reviewed history, exam, results, treatments.  She agrees admit the patient to Dr. Centeno. Chest XR, knee XR, and respiratory panel pending at the time of admission    [CC]   0537 Bacteria, UA(!): 4+ [CC]   0537 WBC, UA(!): 21-50 [CC]   0547 Troponin T Numeric Delta: 2  Flat, patient is not having any chest pain.  [CC]      ED Course User Index  [CC] Edelmira Siddiqi PA-C           AS OF 01:59 EST VITALS:    BP - 133/68  HR - 106  TEMP - (!) 102.3 °F (39.1 °C) (Oral)  O2 SATS - 93%      MDM:  Patient is a 74-year-old female who presents emergency department today with generalized weakness, hypotension, and hypoxia.  Initially EMS was called out for knee pain and inability to walk but on arrival they found her to be hypoxic at 88% on room air and hypotensive with a blood pressure of 80/40.  She arrives on 3 L of oxygen per nasal cannula and had fluid and route and blood pressure is now normal.  On my exam the patient is ill-appearing.  She has a coarse sounding cough with rhonchorous breath sounds.  She is neurovascularly intact in the lower extremities and there is no obvious swelling or deformity to the knees but she does have pain with range of motion.  Patient was evaluated with labs which revealed a significant leukocytosis and an SONI.  She was also found to have an elevated anion gap, elevated LFTs, and elevated Pro-Jayden and troponin.  Urinalysis was also equivocal for urinary tract infection.  Chest x-ray revealed a right lower lobe  pneumonia.  Given all of this patient was started on antibiotics and blood cultures were obtained.   X-ray of the knees show some degenerative changes but no acute abnormalities.  She will require admission to the hospital for further management of sepsis. She is stable at time of admission.      COMPLEXITY OF CARE  The patient requires admission.    Critical care provider statement:    Critical care time (minutes): 35.   Critical care time was exclusive of:  Separately billable procedures and treating other patients   Critical care was necessary to treat or prevent imminent or life-threatening deterioration of the following conditions:  Sepsis   Critical care was time spent personally by me on the following activities:  Development of treatment plan with patient or surrogate, discussions with consultants, evaluation of patient's response to treatment, examination of patient, obtaining history from patient or surrogate, ordering and performing treatments and interventions, ordering and review of laboratory studies, ordering and review of radiographic studies, pulse oximetry, re-evaluation of patient's condition and review of old charts. Critical Care indicators: Sepsis / septicemia     DIAGNOSIS  Final diagnoses:   Pneumonia of right lower lobe due to infectious organism   Acute respiratory failure with hypoxia   Acute pain of both knees   Generalized weakness   SONI (acute kidney injury)   Acute UTI         DISPOSITION  ED Disposition       ED Disposition   Decision to Admit    Condition   --    Comment   Level of Care: Telemetry [5]   Diagnosis: Right lower lobe pneumonia [670992]   Admitting Physician: ANGELIQUE SUMMERS [[id number]]   Attending Physician: ANGELIQUE SUMMERS [[id number]]   Certification: I Certify That Inpatient Hospital Services Are Medically Necessary For Greater Than 2 Midnights                        Please note that portions of this document were completed with a voice recognition program.    Note  Disclaimer: At [facility], we believe that sharing information builds trust and better relationships. You are receiving this note because you recently visited [facility]. It is possible you will see health information before a provider has talked with you about it. This kind of information can be easy to misunderstand. To help you fully understand what it means for your health, we urge you to discuss this note with your provider.     Edelmira Siddiqi PA-C  02/11/25 0202

## 2025-02-11 ENCOUNTER — APPOINTMENT (OUTPATIENT)
Dept: GENERAL RADIOLOGY | Facility: HOSPITAL | Age: 75
DRG: 853 | End: 2025-02-11
Payer: MEDICARE

## 2025-02-11 ENCOUNTER — APPOINTMENT (OUTPATIENT)
Dept: CT IMAGING | Facility: HOSPITAL | Age: 75
DRG: 853 | End: 2025-02-11
Payer: MEDICARE

## 2025-02-11 LAB
ANION GAP SERPL CALCULATED.3IONS-SCNC: 15.6 MMOL/L (ref 5–15)
APPEARANCE FLD: ABNORMAL
BUN SERPL-MCNC: 36 MG/DL (ref 8–23)
BUN/CREAT SERPL: 29.8 (ref 7–25)
CALCIUM SPEC-SCNC: 8.6 MG/DL (ref 8.6–10.5)
CHLORIDE SERPL-SCNC: 109 MMOL/L (ref 98–107)
CO2 SERPL-SCNC: 16.4 MMOL/L (ref 22–29)
COLOR FLD: ABNORMAL
CREAT SERPL-MCNC: 1.21 MG/DL (ref 0.57–1)
CRYSTALS FLD MICRO: NORMAL
DEPRECATED RDW RBC AUTO: 41.1 FL (ref 37–54)
EGFRCR SERPLBLD CKD-EPI 2021: 47.1 ML/MIN/1.73
ERYTHROCYTE [DISTWIDTH] IN BLOOD BY AUTOMATED COUNT: 12.6 % (ref 12.3–15.4)
GLUCOSE BLDC GLUCOMTR-MCNC: 186 MG/DL (ref 70–130)
GLUCOSE BLDC GLUCOMTR-MCNC: 199 MG/DL (ref 70–130)
GLUCOSE BLDC GLUCOMTR-MCNC: 317 MG/DL (ref 70–130)
GLUCOSE BLDC GLUCOMTR-MCNC: 96 MG/DL (ref 70–130)
GLUCOSE SERPL-MCNC: 98 MG/DL (ref 65–99)
HCT VFR BLD AUTO: 31 % (ref 34–46.6)
HGB BLD-MCNC: 10 G/DL (ref 12–15.9)
MCH RBC QN AUTO: 29.1 PG (ref 26.6–33)
MCHC RBC AUTO-ENTMCNC: 32.3 G/DL (ref 31.5–35.7)
MCV RBC AUTO: 90.1 FL (ref 79–97)
METHOD: ABNORMAL
MONOCYTES NFR FLD: 7 %
NEUTROPHILS NFR FLD MANUAL: 93 %
NUC CELL # FLD: ABNORMAL /MM3
PLATELET # BLD AUTO: 408 10*3/MM3 (ref 140–450)
PMV BLD AUTO: 8.9 FL (ref 6–12)
POTASSIUM SERPL-SCNC: 4.5 MMOL/L (ref 3.5–5.2)
RBC # BLD AUTO: 3.44 10*6/MM3 (ref 3.77–5.28)
RBC # FLD AUTO: 4000 /MM3
SODIUM SERPL-SCNC: 141 MMOL/L (ref 136–145)
WBC NRBC COR # BLD AUTO: 17.81 10*3/MM3 (ref 3.4–10.8)

## 2025-02-11 PROCEDURE — 89051 BODY FLUID CELL COUNT: CPT | Performed by: ORTHOPAEDIC SURGERY

## 2025-02-11 PROCEDURE — 94761 N-INVAS EAR/PLS OXIMETRY MLT: CPT

## 2025-02-11 PROCEDURE — 94664 DEMO&/EVAL PT USE INHALER: CPT

## 2025-02-11 PROCEDURE — 94799 UNLISTED PULMONARY SVC/PX: CPT

## 2025-02-11 PROCEDURE — 25010000002 CEFTRIAXONE PER 250 MG: Performed by: NURSE PRACTITIONER

## 2025-02-11 PROCEDURE — 87205 SMEAR GRAM STAIN: CPT | Performed by: ORTHOPAEDIC SURGERY

## 2025-02-11 PROCEDURE — 25810000003 SODIUM CHLORIDE 0.9 % SOLUTION: Performed by: NURSE PRACTITIONER

## 2025-02-11 PROCEDURE — 80048 BASIC METABOLIC PNL TOTAL CA: CPT | Performed by: HOSPITALIST

## 2025-02-11 PROCEDURE — 25010000002 ENOXAPARIN PER 10 MG: Performed by: HOSPITALIST

## 2025-02-11 PROCEDURE — 73090 X-RAY EXAM OF FOREARM: CPT

## 2025-02-11 PROCEDURE — 89060 EXAM SYNOVIAL FLUID CRYSTALS: CPT | Performed by: ORTHOPAEDIC SURGERY

## 2025-02-11 PROCEDURE — 82948 REAGENT STRIP/BLOOD GLUCOSE: CPT

## 2025-02-11 PROCEDURE — 85027 COMPLETE CBC AUTOMATED: CPT | Performed by: HOSPITALIST

## 2025-02-11 PROCEDURE — 63710000001 INSULIN LISPRO (HUMAN) PER 5 UNITS: Performed by: HOSPITALIST

## 2025-02-11 PROCEDURE — 97161 PT EVAL LOW COMPLEX 20 MIN: CPT

## 2025-02-11 PROCEDURE — 73030 X-RAY EXAM OF SHOULDER: CPT

## 2025-02-11 PROCEDURE — 63710000001 INSULIN GLARGINE PER 5 UNITS: Performed by: HOSPITALIST

## 2025-02-11 PROCEDURE — 87015 SPECIMEN INFECT AGNT CONCNTJ: CPT | Performed by: ORTHOPAEDIC SURGERY

## 2025-02-11 PROCEDURE — 94760 N-INVAS EAR/PLS OXIMETRY 1: CPT

## 2025-02-11 PROCEDURE — 97530 THERAPEUTIC ACTIVITIES: CPT

## 2025-02-11 RX ORDER — SODIUM BICARBONATE 650 MG/1
650 TABLET ORAL NIGHTLY
Status: DISCONTINUED | OUTPATIENT
Start: 2025-02-12 | End: 2025-02-14

## 2025-02-11 RX ORDER — ROSUVASTATIN CALCIUM 10 MG/1
10 TABLET, COATED ORAL NIGHTLY
Status: DISCONTINUED | OUTPATIENT
Start: 2025-02-12 | End: 2025-02-14

## 2025-02-11 RX ADMIN — INSULIN LISPRO 2 UNITS: 100 INJECTION, SOLUTION INTRAVENOUS; SUBCUTANEOUS at 20:27

## 2025-02-11 RX ADMIN — ACETAMINOPHEN 650 MG: 325 TABLET, FILM COATED ORAL at 15:27

## 2025-02-11 RX ADMIN — CARVEDILOL 12.5 MG: 12.5 TABLET, FILM COATED ORAL at 11:20

## 2025-02-11 RX ADMIN — IPRATROPIUM BROMIDE AND ALBUTEROL SULFATE 3 ML: 2.5; .5 SOLUTION RESPIRATORY (INHALATION) at 15:05

## 2025-02-11 RX ADMIN — ENOXAPARIN SODIUM 40 MG: 100 INJECTION SUBCUTANEOUS at 14:25

## 2025-02-11 RX ADMIN — IPRATROPIUM BROMIDE AND ALBUTEROL SULFATE 3 ML: 2.5; .5 SOLUTION RESPIRATORY (INHALATION) at 19:08

## 2025-02-11 RX ADMIN — CEFTRIAXONE 2000 MG: 2 INJECTION, POWDER, FOR SOLUTION INTRAMUSCULAR; INTRAVENOUS at 05:32

## 2025-02-11 RX ADMIN — INSULIN GLARGINE 10 UNITS: 100 INJECTION, SOLUTION SUBCUTANEOUS at 09:19

## 2025-02-11 RX ADMIN — ASPIRIN 81 MG: 81 TABLET, COATED ORAL at 09:20

## 2025-02-11 RX ADMIN — IPRATROPIUM BROMIDE AND ALBUTEROL SULFATE 3 ML: 2.5; .5 SOLUTION RESPIRATORY (INHALATION) at 08:25

## 2025-02-11 RX ADMIN — Medication 10 ML: at 09:20

## 2025-02-11 RX ADMIN — INSULIN LISPRO 2 UNITS: 100 INJECTION, SOLUTION INTRAVENOUS; SUBCUTANEOUS at 17:48

## 2025-02-11 RX ADMIN — SODIUM ZIRCONIUM CYCLOSILICATE 5 G: 5 POWDER, FOR SUSPENSION ORAL at 09:20

## 2025-02-11 RX ADMIN — SODIUM CHLORIDE 100 ML/HR: 9 INJECTION, SOLUTION INTRAVENOUS at 04:14

## 2025-02-11 RX ADMIN — ACETAMINOPHEN 650 MG: 325 TABLET, FILM COATED ORAL at 00:19

## 2025-02-11 RX ADMIN — Medication 10 ML: at 20:27

## 2025-02-11 RX ADMIN — INSULIN LISPRO 7 UNITS: 100 INJECTION, SOLUTION INTRAVENOUS; SUBCUTANEOUS at 12:24

## 2025-02-11 RX ADMIN — METHOCARBAMOL TABLETS 500 MG: 500 TABLET, COATED ORAL at 15:27

## 2025-02-11 RX ADMIN — GLIPIZIDE 5 MG: 5 TABLET ORAL at 09:20

## 2025-02-11 RX ADMIN — EMPAGLIFLOZIN 25 MG: 25 TABLET, FILM COATED ORAL at 09:20

## 2025-02-11 NOTE — DISCHARGE PLACEMENT REQUEST
"Chrissy Gutierrez (74 y.o. Female)       Date of Birth   1950    Social Security Number       Address   [address] [place] [address]    Home Phone   [phone]    MRN   [id number]       [facility]   [facility]    Marital Status                               Admission Date   2/10/25    Admission Type   Emergency    Admitting Provider   Jed Magdaleno MD    Attending Provider   Raleigh Boateng MD    Department, Room/Bed   [facility] POD F, F51/1       Discharge Date       Discharge Disposition       Discharge Destination                                 Attending Provider: Raleigh Boateng MD    Allergies: Ibuprofen    Isolation: Droplet   Infection: Influenza (02/10/25)   Code Status: CPR    Ht: 170.2 cm (67\")   Wt: 92.5 kg (204 lb)    Admission Cmt: None   Principal Problem: Bacterial pneumonia [J15.9]                   Active Insurance as of 2/10/2025       Primary Coverage       Payor Plan Insurance Group Employer/Plan Group    ANTHEM MEDICARE REPLACEMENT ANTHEM MED ADV HMO [id number]       Payor Plan Address Payor Plan Phone Number Payor Plan Fax Number Effective Dates    [address] [phone]  1/1/2025 - None Entered    [facility] [id number]         Subscriber Name Subscriber Birth Date Member ID       CHRISSY GUTIERREZ 1950 [id number]                     Emergency Contacts        (Rel.) Home Phone Work Phone Mobile Phone    HARIKA GUTIERREZ (Son) [phone] -- --                "

## 2025-02-11 NOTE — PAYOR COMM NOTE
"Chrissy Gutierrez (74 y.o. Female)     ATTN: NURSE REVIEWER  RE: INITIAL INPT AUTH CLINICALS   REF# [id number]  PLS REPLY TO VEL NGUYEN [phone] OR FAX# [phone]      Date of Birth   1950    Social Security Number       Address   [address] [place], [place] [address]    Home Phone   [phone]    MRN   [id number]       [employer]   [employer]    Marital Status                               Admission Date   2/10/25    Admission Type   Emergency    Admitting Provider   Jed Magdaleno MD    Attending Provider   Raleigh Boateng MD    Department, Room/Bed   [facility] POD F, F51/1       Discharge Date       Discharge Disposition       Discharge Destination                                 Attending Provider: Raleigh Boateng MD    Allergies: Ibuprofen    Isolation: Droplet   Infection: Influenza (02/10/25)   Code Status: CPR    Ht: 170.2 cm (67\")   Wt: 92.5 kg (204 lb)    Admission Cmt: None   Principal Problem: Bacterial pneumonia [J15.9]                   Active Insurance as of 2/10/2025       Primary Coverage       Payor Plan Insurance Group Employer/Plan Group    ANTHEM MEDICARE REPLACEMENT ANTHEM MED ADV HMO [id number]       Payor Plan Address Payor Plan Phone Number Payor Plan Fax Number Effective Dates    [address] [phone]  1/1/2025 - None Entered    [facility] [id number]         Subscriber Name Subscriber Birth Date Member ID       CHRISSY GUTIERREZ 1950 [id number]                     Emergency Contacts        (Rel.) Home Phone Work Phone Mobile Phone    HARIKA GUTIERREZ (Son) [phone] -- --                 History & Physical        Jed Magdaleno MD at 02/10/25 1138              Patient Name:  Chrissy Gutierrez  YOB: 1950  MRN:  [id number]  Admit Date:  2/10/2025  Patient Care Team:  Jai Steven MD as PCP - General (Emergency Medicine)      Subjective  History Present Illness     Chief " Complaint   Patient presents with    Weakness - Generalized    Flu Exposure       Ms. Gutierrez is a 74 y.o. female with past history of hypertension, diabetes mellitus type 2, CKD 3 who presented to hospital with primarily weakness and left knee pain.  She gets around with a walker at baseline.  She fell several days ago but did not fall onto her knee, more of a sitting fall down onto her buttock.  However since that time she has had increased pain in her left knee to the point where she cannot really walk well even while using her walker.  Over the same time she has been dealing with what she thought was a bad cold although she admits that she had a family member diagnosed with the flu about a week ago.  She has been sick at least that long, primarily with just upper respiratory congestion and some nonproductive cough.  She is not sure about any fevers.  Her oxygen sats here were in the mid to high 80s on room air and since then she has been titrated upward to 4 L to maintain sats only in the low 90s when I saw her.  She does not endorse any particular shortness of breath.  She denies any chest pain.  She is primarily asking for something to be done about her knee and is requesting a cortisone injection.      Review of Systems   Constitutional:  Negative for chills, fatigue and fever.   HENT:  Positive for congestion, rhinorrhea and sinus pressure. Negative for trouble swallowing.    Eyes:  Negative for visual disturbance.   Respiratory:  Positive for cough. Negative for chest tightness and shortness of breath.    Cardiovascular:  Negative for chest pain, palpitations and leg swelling.   Gastrointestinal:  Negative for abdominal distention, abdominal pain, constipation, diarrhea, nausea and vomiting.   Genitourinary:  Negative for difficulty urinating, dysuria and frequency.   Musculoskeletal:  Positive for arthralgias (Left knee and to a lesser extent the right knee).   Skin:  Negative for rash.   Neurological:   Negative for dizziness and headaches.   Psychiatric/Behavioral:  Negative for confusion.         Personal History     Past Medical History:   Diagnosis Date    Cancer     right kidney    Chronic kidney disease     Hypertension     Low back pain     Osteoarthritis     Peripheral neuropathy      Past Surgical History:   Procedure Laterality Date    BACK SURGERY  2021    EPIDURAL BLOCK      KIDNEY SURGERY Right 03/08/2017    REMOVED RIGHT KIDNEY    LUMBAR DISCECTOMY FUSION INSTRUMENTATION N/A 06/09/2017    Procedure: 1 LEVEL LAMINECTOMY DECOMPRESSION L4 5 EXCISION FACET CYST;  Surgeon: Negrito Oconnell DO;  Location: [facility] MAIN OR;  Service:     TUBAL ABDOMINAL LIGATION      WISDOM TOOTH EXTRACTION       History reviewed. No pertinent family history.  Social History     Tobacco Use    Smoking status: Never    Smokeless tobacco: Never   Vaping Use    Vaping status: Never Used   Substance Use Topics    Alcohol use: Not Currently     Comment: socially    Drug use: No     Types: Hydrocodone     No current facility-administered medications on file prior to encounter.     Current Outpatient Medications on File Prior to Encounter   Medication Sig Dispense Refill    acetaminophen (TYLENOL) 500 MG tablet Take 1 tablet by mouth Every 6 (Six) Hours As Needed for Mild Pain.      amLODIPine-benazepril (LOTREL) 10-40 MG per capsule Take 1 capsule by mouth.      aspirin 81 MG EC tablet Take 1 tablet by mouth Daily.      carvedilol (COREG) 25 MG tablet Take 1 tablet by mouth.      dapagliflozin Propanediol (Farxiga) 10 MG tablet Take 10 mg by mouth Daily.      furosemide (LASIX) 20 MG tablet Take 1 tablet by mouth Daily.      Garlic 400 MG tablet delayed-release Take 1 tablet by mouth. On hold for sx.      glimepiride (AMARYL) 2 MG tablet Take 1 tablet by mouth.      hydrochlorothiazide (HYDRODIURIL) 25 MG tablet Take 1 tablet by mouth.      insulin glargine (LANTUS, SEMGLEE) 100 UNIT/ML injection Inject 10 Units under the skin into the  appropriate area as directed Daily.      methocarbamol (ROBAXIN) 500 MG tablet Take 1 tablet by mouth 4 (Four) Times a Day As Needed for Muscle Spasms. 15 tablet 0    Misc Natural Products (OSTEO BI-FLEX ADV DOUBLE ST PO) Take 2 tablets by mouth Daily.      NIACIN PO Take 1 tablet by mouth Daily.      Omega-3 Fatty Acids (FISH OIL CONCENTRATE PO) Take 2,000 mg by mouth Daily.      rosuvastatin (CRESTOR) 10 MG tablet Take 1 tablet by mouth Daily.      sodium bicarbonate 650 MG tablet Take 1 tablet by mouth Daily.      sodium zirconium cyclosilicate (Lokelma) 5 g packet Take 5 g by mouth Every Other Day. Empty entire contents of the packet(s) into a glass with at least 3 tablespoons (45 mL) of water. Stir well and drink immediately; if powder remains in the glass, add water, stir and drink immediately; repeat until no powder remains. Administer other oral medications at least 2 hours before or 2 hours after dose.      HYDROcodone-acetaminophen (NORCO) 5-325 MG per tablet Take 1 tablet by mouth Every 6 (Six) Hours As Needed (Pain). 40 tablet 0    metFORMIN (GLUCOPHAGE) 500 MG tablet Take 1 tablet by mouth 2 (Two) Times a Day With Meals.      Multiple Vitamins-Minerals (MULTIVITAMIN ADULT PO) Take 1 tablet by mouth Daily.      simvastatin (ZOCOR) 20 MG tablet Take 1 tablet by mouth.      [DISCONTINUED] predniSONE (DELTASONE) 20 MG tablet 2 tablets by mouth daily for 5 days 10 tablet 0     Allergies   Allergen Reactions    Ibuprofen Itching and Other (See Comments)     tachycardia       Objective   Objective     Vital Signs  Temp:  [98.3 °F (36.8 °C)-98.7 °F (37.1 °C)] 98.6 °F (37 °C)  Heart Rate:  [92-94] 94  Resp:  [14-15] 15  BP: (106-110)/(55-62) 106/55  SpO2:  [87 %-94 %] 90 %  on  Flow (L/min) (Oxygen Therapy):  [4] 4;   Device (Oxygen Therapy): nasal cannula  Body mass index is 31.95 kg/m².    Physical Exam  Vitals reviewed.   Constitutional:       General: She is not in acute distress.     Appearance: She is  well-developed. She is obese. She is not ill-appearing.   HENT:      Head: Normocephalic and atraumatic.      Mouth/Throat:      Pharynx: No oropharyngeal exudate.   Eyes:      General: No scleral icterus.  Neck:      Vascular: No JVD.   Cardiovascular:      Rate and Rhythm: Normal rate and regular rhythm.      Heart sounds: Normal heart sounds. No murmur heard.  Pulmonary:      Effort: Pulmonary effort is normal. No respiratory distress.      Comments: Faint rhonchi right base  Abdominal:      General: Bowel sounds are normal. There is no distension.      Palpations: Abdomen is soft.      Tenderness: There is no abdominal tenderness.   Musculoskeletal:      Cervical back: Neck supple.      Right lower leg: No edema.      Left lower leg: No edema.      Comments: Left knee is tender to palpation with limited range of motion.  Slightly warm although she was under 6 blankets when I saw her so difficult to interpret.   Lymphadenopathy:      Cervical: No cervical adenopathy.   Skin:     General: Skin is warm and dry.      Coloration: Skin is not pale.   Neurological:      Mental Status: She is alert and oriented to person, place, and time.   Psychiatric:         Mood and Affect: Mood normal.         Results Review:  I reviewed the patient's new clinical results.  I reviewed the patient's new imaging results and agree with the interpretation.  I reviewed the patient's other test results and agree with the interpretation  I personally viewed and interpreted the patient's EKG/Telemetry data  Discussed with ED provider.    Lab Results (last 24 hours)       Procedure Component Value Units Date/Time    CBC & Differential [[id number]]  (Abnormal) Collected: 02/10/25 0357    Specimen: Blood from Arm, Right Updated: 02/10/25 7496    Narrative:      The following orders were created for panel order CBC & Differential.  Procedure                               Abnormality         Status                     ---------                                -----------         ------                     CBC Auto Differential[[id number]]        Abnormal            Final result                 Please view results for these tests on the individual orders.    Comprehensive Metabolic Panel [[id number]]  (Abnormal) Collected: 02/10/25 0357    Specimen: Blood from Arm, Right Updated: 02/10/25 0438     Glucose 234 mg/dL      BUN 56 mg/dL      Creatinine 1.73 mg/dL      Sodium 136 mmol/L      Potassium 4.8 mmol/L      Chloride 104 mmol/L      CO2 14.5 mmol/L      Calcium 8.8 mg/dL      Total Protein 7.2 g/dL      Albumin 3.2 g/dL      ALT (SGPT) 108 U/L      AST (SGOT) 77 U/L      Alkaline Phosphatase 84 U/L      Total Bilirubin 0.3 mg/dL      Globulin 4.0 gm/dL      A/G Ratio 0.8 g/dL      BUN/Creatinine Ratio 32.4     Anion Gap 17.5 mmol/L      eGFR 30.7 mL/min/1.73     Narrative:      GFR Categories in Chronic Kidney Disease (CKD)      GFR Category          GFR (mL/min/1.73)    Interpretation  G1                     90 or greater         Normal or high (1)  G2                      60-89                Mild decrease (1)  G3a                   45-59                Mild to moderate decrease  G3b                   30-44                Moderate to severe decrease  G4                    15-29                Severe decrease  G5                    14 or less           Kidney failure          (1)In the absence of evidence of kidney disease, neither GFR category G1 or G2 fulfill the criteria for CKD.    eGFR calculation 2021 CKD-EPI creatinine equation, which does not include race as a factor    BNP [[id number]]  (Normal) Collected: 02/10/25 0357    Specimen: Blood from Arm, Right Updated: 02/10/25 0443     proBNP 310.0 pg/mL     Narrative:      This assay is used as an aid in the diagnosis of individuals suspected of having heart failure. It can be used as an aid in the diagnosis of acute decompensated heart failure (ADHF) in patients presenting with signs and symptoms of  ADHF to the emergency department (ED). In addition, NT-proBNP of <300 pg/mL indicates ADHF is not likely.    Age Range Result Interpretation  NT-proBNP Concentration (pg/mL:      <50             Positive            >450                   Gray                 300-450                    Negative             <300    50-75           Positive            >900                  Gray                300-900                  Negative            <300      >75             Positive            >1800                  Gray                300-1800                  Negative            <300    High Sensitivity Troponin T [[id number]]  (Abnormal) Collected: 02/10/25 0357    Specimen: Blood from Arm, Right Updated: 02/10/25 0446     HS Troponin T 57 ng/L     Narrative:      High Sensitive Troponin T Reference Range:  <14.0 ng/L- Negative Female for AMI  <22.0 ng/L- Negative Male for AMI  >=14 - Abnormal Female indicating possible myocardial injury.  >=22 - Abnormal Male indicating possible myocardial injury.   Clinicians would have to utilize clinical acumen, EKG, Troponin, and serial changes to determine if it is an Acute Myocardial Infarction or myocardial injury due to an underlying chronic condition.         CBC Auto Differential [[id number]]  (Abnormal) Collected: 02/10/25 0357    Specimen: Blood from Arm, Right Updated: 02/10/25 0416     WBC 17.10 10*3/mm3      RBC 3.49 10*6/mm3      Hemoglobin 10.4 g/dL      Hematocrit 31.0 %      MCV 88.8 fL      MCH 29.8 pg      MCHC 33.5 g/dL      RDW 12.2 %      RDW-SD 39.4 fl      MPV 8.9 fL      Platelets 420 10*3/mm3      Neutrophil % 84.8 %      Lymphocyte % 4.9 %      Monocyte % 9.1 %      Eosinophil % 0.1 %      Basophil % 0.2 %      Immature Grans % 0.9 %      Neutrophils, Absolute 14.50 10*3/mm3      Lymphocytes, Absolute 0.84 10*3/mm3      Monocytes, Absolute 1.56 10*3/mm3      Eosinophils, Absolute 0.01 10*3/mm3      Basophils, Absolute 0.03 10*3/mm3      Immature Grans, Absolute 0.16  "10*3/mm3      nRBC 0.0 /100 WBC     Lactic Acid, Plasma [[id number]]  (Normal) Collected: 02/10/25 0357    Specimen: Blood from Arm, Right Updated: 02/10/25 0433     Lactate 1.2 mmol/L     Procalcitonin [[id number]]  (Abnormal) Collected: 02/10/25 0357    Specimen: Blood from Arm, Right Updated: 02/10/25 0443     Procalcitonin 0.95 ng/mL     Narrative:      As a Marker for Sepsis (Non-Neonates):    1. <0.5 ng/mL represents a low risk of severe sepsis and/or septic shock.  2. >2 ng/mL represents a high risk of severe sepsis and/or septic shock.    As a Marker for Lower Respiratory Tract Infections that require antibiotic therapy:    PCT on Admission    Antibiotic Therapy       6-12 Hrs later    >0.5                Strongly Recommended  >0.25 - <0.5        Recommended   0.1 - 0.25          Discouraged              Remeasure/reassess PCT  <0.1                Strongly Discouraged     Remeasure/reassess PCT    As 28 day mortality risk marker: \"Change in Procalcitonin Result\" (>80% or <=80%) if Day 0 (or Day 1) and Day 4 values are available. Refer to http://www.[employer]-pct-calculator.com    Change in PCT <=80%  A decrease of PCT levels below or equal to 80% defines a positive change in PCT test result representing a higher risk for 28-day all-cause mortality of patients diagnosed with severe sepsis for septic shock.    Change in PCT >80%  A decrease of PCT levels of more than 80% defines a negative change in PCT result representing a lower risk for 28-day all-cause mortality of patients diagnosed with severe sepsis or septic shock.       Respiratory Panel PCR w/COVID-19(SARS-CoV-2) [facility] In-House, NP Swab in UTM/VTM, 2 HR TAT - Swab, Nasopharynx [[id number]]  (Abnormal) Collected: 02/10/25 0401    Specimen: Swab from Nasopharynx Updated: 02/10/25 0824     ADENOVIRUS, PCR Not Detected     Coronavirus 229E Not Detected     Coronavirus HKU1 Not Detected     Coronavirus NL63 Not Detected     Coronavirus OC43 " Not Detected     COVID19 Not Detected     Human Metapneumovirus Not Detected     Human Rhinovirus/Enterovirus Not Detected     Influenza A PCR Equivocal     Comment: Appended report. These results have been appended to a previously preliminary verified report.        Influenza B PCR Not Detected     Parainfluenza Virus 1 Not Detected     Parainfluenza Virus 2 Not Detected     Parainfluenza Virus 3 Not Detected     Parainfluenza Virus 4 Not Detected     RSV, PCR Not Detected     Bordetella pertussis pcr Not Detected     Bordetella parapertussis PCR Not Detected     Chlamydophila pneumoniae PCR Not Detected     Mycoplasma pneumo by PCR Not Detected    Narrative:      In the setting of a positive respiratory panel with a viral infection PLUS a negative procalcitonin without other underlying concern for bacterial infection, consider observing off antibiotics or discontinuation of antibiotics and continue supportive care. If the respiratory panel is positive for atypical bacterial infection (Bordetella pertussis, Chlamydophila pneumoniae, or Mycoplasma pneumoniae), consider antibiotic de-escalation to target atypical bacterial infection.    Blood Culture - Blood, Arm, Left [[id number]] Collected: 02/10/25 0500    Specimen: Blood from Arm, Left Updated: 02/10/25 0512    High Sensitivity Troponin T 1Hr [[id number]]  (Abnormal) Collected: 02/10/25 0500    Specimen: Blood from Arm, Left Updated: 02/10/25 0541     HS Troponin T 59 ng/L      Troponin T Numeric Delta 2 ng/L      Troponin T % Delta 4    Narrative:      High Sensitive Troponin T Reference Range:  <14.0 ng/L- Negative Female for AMI  <22.0 ng/L- Negative Male for AMI  >=14 - Abnormal Female indicating possible myocardial injury.  >=22 - Abnormal Male indicating possible myocardial injury.   Clinicians would have to utilize clinical acumen, EKG, Troponin, and serial changes to determine if it is an Acute Myocardial Infarction or myocardial injury due to an underlying  chronic condition.         Blood Culture - Blood, Arm, Left [[id number]] Collected: 02/10/25 0501    Specimen: Blood from Arm, Left Updated: 02/10/25 0512    Urinalysis With Microscopic If Indicated (No Culture) - Urine, Clean Catch [[id number]]  (Abnormal) Collected: 02/10/25 0502    Specimen: Urine, Clean Catch Updated: 02/10/25 0531     Color, UA Yellow     Appearance, UA Cloudy     pH, UA 5.5     Specific Gravity, UA 1.021     Glucose, UA >=1000 mg/dL (3+)     Ketones, UA 15 mg/dL (1+)     Bilirubin, UA Negative     Blood, UA Trace     Protein, UA 30 mg/dL (1+)     Leuk Esterase, UA Small (1+)     Nitrite, UA Negative     Urobilinogen, UA 1.0 E.U./dL    Urinalysis, Microscopic Only - Urine, Clean Catch [[id number]]  (Abnormal) Collected: 02/10/25 0502    Specimen: Urine, Clean Catch Updated: 02/10/25 0531     RBC, UA 3-5 /HPF      WBC, UA 21-50 /HPF      Bacteria, UA 4+ /HPF      Squamous Epithelial Cells, UA 7-12 /HPF      Hyaline Casts, UA 0-2 /LPF      Methodology Automated Microscopy    POC Glucose Once [[id number]]  (Abnormal) Collected: 02/10/25 0740    Specimen: Blood Updated: 02/10/25 0743     Glucose 199 mg/dL     Basic Metabolic Panel [[id number]]  (Abnormal) Collected: 02/10/25 0748    Specimen: Blood Updated: 02/10/25 0821     Glucose 209 mg/dL      BUN 53 mg/dL      Creatinine 1.58 mg/dL      Sodium 137 mmol/L      Potassium 4.5 mmol/L      Chloride 105 mmol/L      CO2 15.2 mmol/L      Calcium 8.6 mg/dL      BUN/Creatinine Ratio 33.5     Anion Gap 16.8 mmol/L      eGFR 34.2 mL/min/1.73     Narrative:      GFR Categories in Chronic Kidney Disease (CKD)      GFR Category          GFR (mL/min/1.73)    Interpretation  G1                     90 or greater         Normal or high (1)  G2                      60-89                Mild decrease (1)  G3a                   45-59                Mild to moderate decrease  G3b                   30-44                Moderate to severe decrease  G4                     15-29                Severe decrease  G5                    14 or less           Kidney failure          (1)In the absence of evidence of kidney disease, neither GFR category G1 or G2 fulfill the criteria for CKD.    eGFR calculation 2021 CKD-EPI creatinine equation, which does not include race as a factor    CBC (No Diff) [[id number]]  (Abnormal) Collected: 02/10/25 0748    Specimen: Blood Updated: 02/10/25 0801     WBC 16.07 10*3/mm3      RBC 3.50 10*6/mm3      Hemoglobin 10.3 g/dL      Hematocrit 31.0 %      MCV 88.6 fL      MCH 29.4 pg      MCHC 33.2 g/dL      RDW 12.2 %      RDW-SD 38.6 fl      MPV 8.8 fL      Platelets 411 10*3/mm3     High Sensitivity Troponin T [[id number]]  (Abnormal) Collected: 02/10/25 0748    Specimen: Blood Updated: 02/10/25 0823     HS Troponin T 62 ng/L     Narrative:      High Sensitive Troponin T Reference Range:  <14.0 ng/L- Negative Female for AMI  <22.0 ng/L- Negative Male for AMI  >=14 - Abnormal Female indicating possible myocardial injury.  >=22 - Abnormal Male indicating possible myocardial injury.   Clinicians would have to utilize clinical acumen, EKG, Troponin, and serial changes to determine if it is an Acute Myocardial Infarction or myocardial injury due to an underlying chronic condition.         POC Glucose Once [[id number]]  (Abnormal) Collected: 02/10/25 1143    Specimen: Blood Updated: 02/10/25 1145     Glucose 229 mg/dL             Imaging Results (Last 24 Hours)       Procedure Component Value Units Date/Time    XR Knee 3 View Bilateral [[id number]] Collected: 02/10/25 0715     Updated: 02/10/25 0719    Narrative:      XR KNEE 3 VW BILATERAL-2/10/2025     HISTORY: Fell, bilateral knee pain.     There is bilateral tibiofemoral joint space narrowing most severe on the  left. Hypertrophic changes are seen in both knees. Bilateral  patellofemoral joint space narrowing is seen.     No fractures or other acute bony abnormalities are seen. Soft tissues  are unremarkable.        Impression:      1. Moderately severe degenerative arthritis of both knees.  2. No fractures are seen.        This report was finalized on 2/10/2025 7:16 AM by Dr. Ramiro Groves M.D on Workstation: [id number]       XR Chest 1 View [[id number]] Collected: 02/10/25 0712     Updated: 02/10/25 0715    Narrative:      XR CHEST 1 VW-2/10/2025     HISTORY: Hypoxia.     Heart size is within normal limits. There is some mild atelectasis or  pneumonia of the right lung base. Right upper lung and left lung appear  clear. Bony structures appear unremarkable.       Impression:      1. Mild atelectasis or pneumonia of the right lung base.        This report was finalized on 2/10/2025 7:12 AM by Dr. Ramiro Groves M.D on Workstation: [id number]                   ECG 12 Lead Dyspnea   Final Result   HEART RATE=88  bpm   RR Vvszbvbv=962  ms   FL Tsyixilg=044  ms   P Horizontal Axis=16  deg   P Front Axis=73  deg   QRSD Interval=94  ms   QT [place]=864  ms   REjA=324  ms   QRS Axis=-40  deg   T Wave Axis=55  deg   - OTHERWISE NORMAL ECG -   Sinus rhythm   Left axis deviation   Abnormal R-wave progression, early transition   NO PRIOR TRACING AVAILABLE FOR COMPARISON   Electronically Signed By: Gómez Robbins ([facility]) 2025-02-10 11:19:26   Date and Time of Study:2025-02-10 04:11:46           Assessment/Plan     Active Hospital Problems    Diagnosis  POA    **Bacterial pneumonia [J15.9]  Yes    Sepsis, unspecified organism [A41.9]  Yes    Influenza A [J10.1]  Yes    Acute pain of left knee [M25.562]  Yes    Type 2 diabetes mellitus with hyperglycemia, with long-term current use of insulin [E11.65, Z79.4]  Not Applicable    CKD (chronic kidney disease) stage 3, GFR 30-59 ml/min [N18.30]  Yes      Resolved Hospital Problems   No resolved problems to display.       Ms. Gutierrez is a 74 y.o. female diabetic with history of CKD 3 who presents to the hospital with acute left knee pain of uncertain etiology along with hypoxia due to influenza  with suspected secondary bacterial pneumonia    Suspected secondary bacterial pneumonia: Meets sepsis criteria with mild tachycardia and leukocytosis along with some pretty impressive hypoxia.  In fact hypoxia is a little out of proportion to what I might expect from the x-ray and had like to do a CT to follow-up further.  In the meantime continue antibiotics.  I do not think Tamiflu will do us any good since her influenza exposure was well over a week ago and she has been sick for that entire time.  Wean O2 as tolerated    Left knee pain: Will ask orthopedics to evaluate.  She does have arthritis on imaging.  Will check some inflammatory markers and uric acid.    For diabetes will resume her oral meds as well as Lantus.  Correctional insulin ordered as needed    Disposition/length of stay anticipated 2 to 3 days      VTE Prophylaxis -Lovenox.  Code Status - Full code.       Jed Magdaleno MD  [facility]  02/10/25  12:53 EST    Electronically signed by Jed Magdaleno MD at 02/10/25 1302       Facility-Administered Medications as of 2/11/2025   Medication Dose Route Frequency Provider Last Rate Last Admin    acetaminophen (TYLENOL) tablet 650 mg  650 mg Oral Q4H PRN Guadalupe Lugo APRN   650 mg at 02/11/25 1527    Or    acetaminophen (TYLENOL) 160 MG/5ML oral solution 650 mg  650 mg Oral Q4H PRN Guadalupe Lugo APRN        Or    acetaminophen (TYLENOL) suppository 650 mg  650 mg Rectal Q4H PRN Guadalupe Lugo APRN        [Held by provider] amLODIPine (NORVASC) tablet 10 mg  10 mg Oral Q24H Jed Magdaleno MD        And    [Held by provider] lisinopril (PRINIVIL,ZESTRIL) tablet 40 mg  40 mg Oral Q24H Jed Magdaleno MD        aspirin EC tablet 81 mg  81 mg Oral Daily Jed Magdaleno MD   81 mg at 02/11/25 0920    sennosides-docusate (PERICOLACE) 8.6-50 MG per tablet 2 tablet  2 tablet Oral BID PRN Guadalupe Lugo  PETER        And    polyethylene glycol (MIRALAX) packet 17 g  17 g Oral Daily PRN Guadalupe Lugo APRN        And    bisacodyl (DULCOLAX) EC tablet 5 mg  5 mg Oral Daily PRN Guadalupe Lugo APRN        And    bisacodyl (DULCOLAX) suppository 10 mg  10 mg Rectal Daily PRN Guadalupe Lugo APRN        calcium carbonate (TUMS) chewable tablet 500 mg (200 mg elemental)  2 tablet Oral BID PRN Guadalupe Lugo APRN        carvedilol (COREG) tablet 12.5 mg  12.5 mg Oral BID With Meals Jed Magdaleno MD   12.5 mg at 02/11/25 1120    [COMPLETED] cefTRIAXone (ROCEPHIN) 2,000 mg in sodium chloride 0.9 % 100 mL MBP  2,000 mg Intravenous Once Edelmira Siddiqi PA-C   Stopped at 02/10/25 0610    cefTRIAXone (ROCEPHIN) 2,000 mg in sodium chloride 0.9 % 100 mL MBP  2,000 mg Intravenous Q24H Guadalupe Lugo APRN 200 mL/hr at 02/11/25 0532 2,000 mg at 02/11/25 0532    dextrose (D50W) (25 g/50 mL) IV injection 25 g  25 g Intravenous Q15 Min PRN Jed Magdaleno MD        dextrose (GLUTOSE) oral gel 15 g  15 g Oral Q15 Min PRN Jed Magdaleno MD        empagliflozin (JARDIANCE) tablet 25 mg  25 mg Oral Daily Jed Magdaleno MD   25 mg at 02/11/25 0920    Enoxaparin Sodium (LOVENOX) syringe 40 mg  40 mg Subcutaneous Q24H Jed Magdaleno MD   40 mg at 02/11/25 1425    glipizide (GLUCOTROL) tablet 5 mg  5 mg Oral QAM AC Jed Magdaleno MD   5 mg at 02/11/25 0920    glucagon (GLUCAGEN) injection 1 mg  1 mg Intramuscular Q15 Min PRN Jed Magdaleno MD        [COMPLETED] guaiFENesin-codeine (ROMILAR-AC) solution 5 mL  5 mL Oral Once Jai Ledesma MD   5 mL at 02/10/25 0602    insulin glargine (LANTUS, SEMGLEE) injection 10 Units  10 Units Subcutaneous Daily Jed Magdaleno MD   10 Units at 02/11/25 0919    insulin lispro (HUMALOG/ADMELOG) injection 2-9 Units  2-9 Units Subcutaneous 4x Daily AC & at Bedtime Rasta  Jed Morales MD   7 Units at 25 1224    ipratropium-albuterol (DUO-NEB) nebulizer solution 3 mL  3 mL Nebulization Q4H PRN Guadalupe Lugo APRN        ipratropium-albuterol (DUO-NEB) nebulizer solution 3 mL  3 mL Nebulization Q6H While Awake - RT Guadalupe Lugo APRN   3 mL at 25 1505    [COMPLETED] lactated ringers bolus 1,000 mL  1,000 mL Intravenous Once Edelmira Siddiqi PA-C 1,000 mL/hr at 02/10/25 0533 1,000 mL at 02/10/25 0533    methocarbamol (ROBAXIN) tablet 500 mg  500 mg Oral 4x Daily PRN Jed Magdaleno MD   500 mg at 25 1527    nitroglycerin (NITROSTAT) SL tablet 0.4 mg  0.4 mg Sublingual Q5 Min PRN Guadalupe Lugo APRN        ondansetron ODT (ZOFRAN-ODT) disintegrating tablet 4 mg  4 mg Oral Q6H PRN Guadalupe Lugo APRN        Or    ondansetron (ZOFRAN) injection 4 mg  4 mg Intravenous Q6H PRN Guadalupe Lugo APRN        [START ON 2025] rosuvastatin (CRESTOR) tablet 10 mg  10 mg Oral Nightly Raleigh Boateng MD        [START ON 2025] sodium bicarbonate tablet 650 mg  650 mg Oral Nightly Raleigh Boateng MD        sodium chloride 0.9 % flush 10 mL  10 mL Intravenous Q12H Guadalupe Lugo APRN   10 mL at 25 0920    sodium chloride 0.9 % flush 10 mL  10 mL Intravenous PRN Guadalupe Lugo APRN        sodium chloride 0.9 % infusion 40 mL  40 mL Intravenous PRN Guadalupe Lugo APRN        [] sodium chloride 0.9 % infusion  100 mL/hr Intravenous Continuous Guadalupe Lugo APRN 100 mL/hr at 25 0414 100 mL/hr at 25 0414    sodium zirconium cyclosilicate (LOKELMA) packet 5 g  5 g Oral Every Other Day Jed Magdaleno MD   5 g at 25 0920     Lab Results (last 24 hours)       Procedure Component Value Units Date/Time    POC Glucose Once [[id number]]  (Abnormal) Collected: 25 1618    Specimen: Blood Updated: 25 1620     Glucose 199 mg/dL     POC Glucose Once [[id number]]   (Abnormal) Collected: 02/11/25 1210    Specimen: Blood Updated: 02/11/25 1211     Glucose 317 mg/dL     Urine Culture - Urine, Urine, Clean Catch [[id number]]  (Abnormal) Collected: 02/10/25 0502    Specimen: Urine, Clean Catch Updated: 02/11/25 1018     Urine Culture >100,000 CFU/mL Gram Negative Bacilli    Narrative:      Colonization of the urinary tract without infection is common. Treatment is discouraged unless the patient is symptomatic, pregnant, or undergoing an invasive urologic procedure.    Basic Metabolic Panel [[id number]]  (Abnormal) Collected: 02/11/25 0654    Specimen: Blood Updated: 02/11/25 0808     Glucose 98 mg/dL      BUN 36 mg/dL      Creatinine 1.21 mg/dL      Sodium 141 mmol/L      Potassium 4.5 mmol/L      Chloride 109 mmol/L      CO2 16.4 mmol/L      Calcium 8.6 mg/dL      BUN/Creatinine Ratio 29.8     Anion Gap 15.6 mmol/L      eGFR 47.1 mL/min/1.73     Narrative:      GFR Categories in Chronic Kidney Disease (CKD)      GFR Category          GFR (mL/min/1.73)    Interpretation  G1                     90 or greater         Normal or high (1)  G2                      60-89                Mild decrease (1)  G3a                   45-59                Mild to moderate decrease  G3b                   30-44                Moderate to severe decrease  G4                    15-29                Severe decrease  G5                    14 or less           Kidney failure          (1)In the absence of evidence of kidney disease, neither GFR category G1 or G2 fulfill the criteria for CKD.    eGFR calculation 2021 CKD-EPI creatinine equation, which does not include race as a factor    POC Glucose Once [[id number]]  (Normal) Collected: 02/11/25 0757    Specimen: Blood Updated: 02/11/25 0759     Glucose 96 mg/dL     CBC (No Diff) [[id number]]  (Abnormal) Collected: 02/11/25 0654    Specimen: Blood Updated: 02/11/25 0746     WBC 17.81 10*3/mm3      RBC 3.44 10*6/mm3      Hemoglobin 10.0 g/dL      Hematocrit  31.0 %      MCV 90.1 fL      MCH 29.1 pg      MCHC 32.3 g/dL      RDW 12.6 %      RDW-SD 41.1 fl      MPV 8.9 fL      Platelets 408 10*3/mm3     Blood Culture - Blood, Arm, Left [[id number]]  (Normal) Collected: 02/10/25 0500    Specimen: Blood from Arm, Left Updated: 02/11/25 0515     Blood Culture No growth at 24 hours    Blood Culture - Blood, Arm, Left [[id number]]  (Normal) Collected: 02/10/25 0501    Specimen: Blood from Arm, Left Updated: 02/11/25 0515     Blood Culture No growth at 24 hours    POC Glucose Once [[id number]]  (Abnormal) Collected: 02/10/25 2103    Specimen: Blood Updated: 02/10/25 2104     Glucose 158 mg/dL           Imaging Results (Last 24 Hours)       Procedure Component Value Units Date/Time    XR Shoulder 2+ View Right [[id number]] Collected: 02/11/25 1702     Updated: 02/11/25 1708    Narrative:      XR SHOULDER 2+ VW RIGHT-, XR FOREARM 2 VW RIGHT-        INDICATION: Pain, swelling     COMPARISON: None     TECHNIQUE: 2 view right shoulder and 2 view right radius and ulna     FINDINGS:      Right shoulder: No fracture. No bone lesion. No dislocation. Decreased  acromiohumeral interval. Degenerative acromioclavicular joint.     Right forearm: Suspect an old distal radius fracture. No acute appearing  fracture. No dislocation. First carpal metacarpal joint osteoarthritis  with joint space loss and osteophytosis.       Impression:         1. No acute appearing fracture. Suspect an old distal radius fracture.  2. Decreased acromiohumeral interval suggestive of underlying rotator  cuff tendinopathy or tear.  3. Degenerative AC joint.  4. Moderate first carpal metacarpal joint osteoarthritis     This report was finalized on 2/11/2025 5:05 PM by Dr. Mark Araya M.D on Workstation: [id number]       XR Forearm 2 View Right [[id number]] Collected: 02/11/25 1702     Updated: 02/11/25 1708    Narrative:      XR SHOULDER 2+ VW RIGHT-, XR FOREARM 2 VW RIGHT-        INDICATION: Pain, swelling      COMPARISON: None     TECHNIQUE: 2 view right shoulder and 2 view right radius and ulna     FINDINGS:      Right shoulder: No fracture. No bone lesion. No dislocation. Decreased  acromiohumeral interval. Degenerative acromioclavicular joint.     Right forearm: Suspect an old distal radius fracture. No acute appearing  fracture. No dislocation. First carpal metacarpal joint osteoarthritis  with joint space loss and osteophytosis.       Impression:         1. No acute appearing fracture. Suspect an old distal radius fracture.  2. Decreased acromiohumeral interval suggestive of underlying rotator  cuff tendinopathy or tear.  3. Degenerative AC joint.  4. Moderate first carpal metacarpal joint osteoarthritis     This report was finalized on 2/11/2025 5:05 PM by Dr. Mark Araya M.D on Workstation: [id number]             ECG/EMG Results (last 24 hours)       ** No results found for the last 24 hours. **          Orders (last 24 hrs)        Start     Ordered    02/12/25 2100  sodium bicarbonate tablet 650 mg  Nightly         02/11/25 1200    02/12/25 2100  rosuvastatin (CRESTOR) tablet 10 mg  Nightly         02/11/25 1200    02/12/25 0600  CBC & Differential  Morning Draw         02/11/25 1414    02/12/25 0600  Comprehensive Metabolic Panel  Morning Draw         02/11/25 1414    02/12/25 0001  NPO Diet NPO Type: Strict NPO, Sips with Meds  Diet Effective Midnight         02/11/25 1646    02/11/25 1648  Body Fluid Cell Count With Differential - Synovial Fluid, Knee, Left  STAT         02/11/25 1647    02/11/25 1648  Body Fluid Culture - Synovial Fluid, Knee, Left  STAT         02/11/25 1647    02/11/25 1648  Crystal Exam, Fluid - Synovial Fluid, Knee, Left  STAT         02/11/25 1647    02/11/25 1648  Anaerobic Culture - Synovial Fluid, Knee, Left  STAT         02/11/25 1647    02/11/25 1648  Body fluid cell count - Synovial Fluid, Knee, Left  PROCEDURE ONCE         02/11/25 1647    02/11/25 1644  XR Shoulder 2+  View Right  1 Time Imaging         02/11/25 1644    02/11/25 1621  POC Glucose Once  PROCEDURE ONCE        Comments: Complete no more than 45 minutes prior to patient eating      02/11/25 1618    02/11/25 1546  XR Forearm 2 View Right  1 Time Imaging         02/11/25 1551    02/11/25 1212  POC Glucose Once  PROCEDURE ONCE        Comments: Complete no more than 45 minutes prior to patient eating      02/11/25 1210    02/11/25 0900  sodium zirconium cyclosilicate (LOKELMA) packet 5 g  Every Other Day         02/10/25 0852    02/11/25 0800  POC Glucose Once  PROCEDURE ONCE        Comments: Complete no more than 45 minutes prior to patient eating      02/11/25 0757    02/11/25 0600  CBC (No Diff)  Morning Draw         02/10/25 1303    02/11/25 0600  Basic Metabolic Panel  Morning Draw         02/10/25 1303    02/11/25 0530  cefTRIAXone (ROCEPHIN) 2,000 mg in sodium chloride 0.9 % 100 mL MBP  Every 24 Hours         02/10/25 0553    02/10/25 2105  POC Glucose Once  PROCEDURE ONCE        Comments: Complete no more than 45 minutes prior to patient eating      02/10/25 2103    02/10/25 1700  POC Glucose 4x Daily Before Meals & at Bedtime  4 Times Daily Before Meals & at Bedtime      Comments: Complete no more than 45 minutes prior to patient eating      02/10/25 1159    02/10/25 1400  Enoxaparin Sodium (LOVENOX) syringe 40 mg  Every 24 Hours         02/10/25 1310    02/10/25 1245  insulin lispro (HUMALOG/ADMELOG) injection 2-9 Units  4 Times Daily Before Meals & Nightly         02/10/25 1159    02/10/25 1159  dextrose (GLUTOSE) oral gel 15 g  Every 15 Minutes PRN         02/10/25 1159    02/10/25 1159  dextrose (D50W) (25 g/50 mL) IV injection 25 g  Every 15 Minutes PRN         02/10/25 1159    02/10/25 1159  glucagon (GLUCAGEN) injection 1 mg  Every 15 Minutes PRN         02/10/25 1159    02/10/25 0945  aspirin EC tablet 81 mg  Daily         02/10/25 0852    02/10/25 0945  [Held by provider]  amLODIPine (NORVASC) tablet 10  "mg  Every 24 Hours Scheduled        (On hold since yesterday at 0852 until manually unheld; held by Jed Magdaleno MDHold Reason: Other (Comment Required)Hold Comments: SOFT BP)   Placed in \"And\" Linked Group    02/10/25 0852    02/10/25 0945  [Held by provider]  lisinopril (PRINIVIL,ZESTRIL) tablet 40 mg  Every 24 Hours Scheduled        (On hold since yesterday at 0852 until manually unheld; held by Jed Magdaleno MDHold Reason: Other (Comment Required))   Placed in \"And\" Linked Group    02/10/25 0852    02/10/25 0945  carvedilol (COREG) tablet 12.5 mg  2 Times Daily With Meals         02/10/25 0852    02/10/25 0945  empagliflozin (JARDIANCE) tablet 25 mg  Daily         02/10/25 0852    02/10/25 0945  glipizide (GLUCOTROL) tablet 5 mg  Every Morning Before Breakfast         02/10/25 0852    02/10/25 0945  insulin glargine (LANTUS, SEMGLEE) injection 10 Units  Daily         02/10/25 0852    02/10/25 0945  rosuvastatin (CRESTOR) tablet 10 mg  Daily,   Status:  Discontinued         02/10/25 0852    02/10/25 0945  sodium bicarbonate tablet 650 mg  Daily,   Status:  Discontinued         02/10/25 0852    02/10/25 0900  sodium chloride 0.9 % flush 10 mL  Every 12 Hours Scheduled         02/10/25 0553    02/10/25 0852  methocarbamol (ROBAXIN) tablet 500 mg  4 Times Daily PRN         02/10/25 0852    02/10/25 0800  Vital Signs  Every 4 Hours       02/10/25 0553    02/10/25 0800  Oral Care  2 Times Daily       02/10/25 0553    02/10/25 0700  ipratropium-albuterol (DUO-NEB) nebulizer solution 3 mL  Every 6 Hours While Awake - RT         02/10/25 0553    02/10/25 0609  sodium chloride 0.9 % infusion  Continuous         02/10/25 0553    02/10/25 0600  Incentive Spirometry  Every 4 Hours While Awake       02/10/25 0553    02/10/25 0552  ipratropium-albuterol (DUO-NEB) nebulizer solution 3 mL  Every 4 Hours PRN         02/10/25 0553    02/10/25 0552  Intake & Output  Every Shift       02/10/25 0553    " "02/10/25 0551  calcium carbonate (TUMS) chewable tablet 500 mg (200 mg elemental)  2 Times Daily PRN         02/10/25 0553    02/10/25 0551  ondansetron ODT (ZOFRAN-ODT) disintegrating tablet 4 mg  Every 6 Hours PRN        Placed in \"Or\" Linked Group    02/10/25 0553    02/10/25 0551  ondansetron (ZOFRAN) injection 4 mg  Every 6 Hours PRN        Placed in \"Or\" Linked Group    02/10/25 0553    02/10/25 0551  sennosides-docusate (PERICOLACE) 8.6-50 MG per tablet 2 tablet  2 Times Daily PRN        Placed in \"And\" Linked Group    02/10/25 0553    02/10/25 0551  polyethylene glycol (MIRALAX) packet 17 g  Daily PRN        Placed in \"And\" Linked Group    02/10/25 0553    02/10/25 0551  bisacodyl (DULCOLAX) EC tablet 5 mg  Daily PRN        Placed in \"And\" Linked Group    02/10/25 0553    02/10/25 0551  bisacodyl (DULCOLAX) suppository 10 mg  Daily PRN        Placed in \"And\" Linked Group    02/10/25 0553    02/10/25 0551  acetaminophen (TYLENOL) tablet 650 mg  Every 4 Hours PRN        Placed in \"Or\" Linked Group    02/10/25 0553    02/10/25 0551  acetaminophen (TYLENOL) 160 MG/5ML oral solution 650 mg  Every 4 Hours PRN        Placed in \"Or\" Linked Group    02/10/25 0553    02/10/25 0551  acetaminophen (TYLENOL) suppository 650 mg  Every 4 Hours PRN        Placed in \"Or\" Linked Group    02/10/25 0553    02/10/25 0551  nitroglycerin (NITROSTAT) SL tablet 0.4 mg  Every 5 Minutes PRN         02/10/25 0553    02/10/25 0551  sodium chloride 0.9 % flush 10 mL  As Needed         02/10/25 0553    02/10/25 0551  sodium chloride 0.9 % infusion 40 mL  As Needed         02/10/25 0553    Unscheduled  Follow Hypoglycemia Standing Orders For Blood Glucose <70 & Notify Provider of Treatment  As Needed      Comments: Follow Hypoglycemia Orders As Outlined in Process Instructions (Open Order Report to View Full Instructions)  Notify Provider Any Time Hypoglycemia Treatment is Administered    02/10/25 1159    --  dapagliflozin Propanediol " (Farxiga) 10 MG tablet  Daily         02/10/25 0553    --  sodium zirconium cyclosilicate (Lokelma) 5 g packet  Every Other Day         02/10/25 0553    --  insulin glargine (LANTUS, SEMGLEE) 100 UNIT/ML injection  Daily         02/10/25 0553                  Operative/Procedure Notes (last 24 hours)  Notes from 02/10/25 1746 through 25 174   No notes of this type exist for this encounter.          Physician Progress Notes (last 24 hours)        Raleigh Boateng MD at 25 1409              Name: Chrissy Gutierrez ADMIT: 2/10/2025   : 1950  PCP: Jai Steven MD    MRN: [id number] LOS: 1 days   AGE/SEX: 74 y.o. female  ROOM: [facility]     Subjective   Subjective   Patient is seen at bedside, she has tachycardia, at the time of my evaluation, does not appear to be in any kind of distress.      Objective   Objective   Vital Signs  Temp:  [99.2 °F (37.3 °C)-102.3 °F (39.1 °C)] 99.2 °F (37.3 °C)  Heart Rate:  [] 117  Resp:  [15-20] 16  BP: (126-149)/(66-81) 149/66  SpO2:  [93 %-98 %] 98 %  on  Flow (L/min) (Oxygen Therapy):  [4] 4;   Device (Oxygen Therapy): nasal cannula  Body mass index is 31.95 kg/m².  Physical Exam  General, awake and alert.  Head and ENT, normocephalic and atraumatic.  Lungs, symmetric expansion, equal air entry bilaterally.  Heart, regular rate and rhythm.  Abdomen, soft and nontender.  Extremities, no clubbing or cyanosis.  Neuro, no focal deficits.  Skin: Warm and no rash.  Psych, normal mood and affect.  Musculoskeletal, joint examination is grossly normal.      Results Review     I reviewed the patient's new clinical results.  Results from last 7 days   Lab Units 25  0654 02/10/25  0748 02/10/25  0357   WBC 10*3/mm3 17.81* 16.07* 17.10*   HEMOGLOBIN g/dL 10.0* 10.3* 10.4*   PLATELETS 10*3/mm3 408 411 420     Results from last 7 days   Lab Units 25  0654 02/10/25  0748 02/10/25  0357   SODIUM mmol/L 141 137 136   POTASSIUM mmol/L 4.5 4.5 4.8   CHLORIDE  mmol/L 109* 105 104   CO2 mmol/L 16.4* 15.2* 14.5*   BUN mg/dL 36* 53* 56*   CREATININE mg/dL 1.21* 1.58* 1.73*   GLUCOSE mg/dL 98 209* 234*   EGFR mL/min/1.73 47.1* 34.2* 30.7*     Results from last 7 days   Lab Units 02/10/25  0357   ALBUMIN g/dL 3.2*   BILIRUBIN mg/dL 0.3   ALK PHOS U/L 84   AST (SGOT) U/L 77*   ALT (SGPT) U/L 108*     Results from last 7 days   Lab Units 02/11/25  0654 02/10/25  0748 02/10/25  0357   CALCIUM mg/dL 8.6 8.6 8.8   ALBUMIN g/dL  --   --  3.2*     Results from last 7 days   Lab Units 02/10/25  0357   PROCALCITONIN ng/mL 0.95*   LACTATE mmol/L 1.2     Glucose   Date/Time Value Ref Range Status   02/11/2025 1210 317 (H) 70 - 130 mg/dL Final   02/11/2025 0757 96 70 - 130 mg/dL Final   02/10/2025 2103 158 (H) 70 - 130 mg/dL Final   02/10/2025 1634 181 (H) 70 - 130 mg/dL Final   02/10/2025 1143 229 (H) 70 - 130 mg/dL Final   02/10/2025 0740 199 (H) 70 - 130 mg/dL Final       XR Knee 3 View Bilateral    Result Date: 2/10/2025  1. Moderately severe degenerative arthritis of both knees. 2. No fractures are seen.   This report was finalized on 2/10/2025 7:16 AM by Dr. Ramiro Groves M.D on Workstation: [id number]      XR Chest 1 View    Result Date: 2/10/2025  1. Mild atelectasis or pneumonia of the right lung base.   This report was finalized on 2/10/2025 7:12 AM by Dr. Ramiro Groves M.D on Workstation: [id number]       I have personally reviewed all medications:  Scheduled Medications  [Held by provider] amLODIPine, 10 mg, Oral, Q24H   And  [Held by provider] lisinopril, 40 mg, Oral, Q24H  aspirin, 81 mg, Oral, Daily  carvedilol, 12.5 mg, Oral, BID With Meals  cefTRIAXone, 2,000 mg, Intravenous, Q24H  empagliflozin, 25 mg, Oral, Daily  enoxaparin, 40 mg, Subcutaneous, Q24H  glipizide, 5 mg, Oral, QAM AC  insulin glargine, 10 Units, Subcutaneous, Daily  insulin lispro, 2-9 Units, Subcutaneous, 4x Daily AC & at Bedtime  ipratropium-albuterol, 3 mL, Nebulization, Q6H While Awake -  RT  [START ON 2/12/2025] rosuvastatin, 10 mg, Oral, Nightly  [START ON 2/12/2025] sodium bicarbonate, 650 mg, Oral, Nightly  sodium chloride, 10 mL, Intravenous, Q12H  sodium zirconium cyclosilicate, 5 g, Oral, Every Other Day    Infusions   Diet  Diet: Cardiac; Healthy Heart (2-3 Na+); Fluid Consistency: Thin (IDDSI 0)    I have personally reviewed:  [x]  Laboratory   [x]  Microbiology   [x]  Radiology   [x]  EKG/Telemetry  [x]  Cardiology/Vascular   []  Pathology    []  Records      Assessment/Plan     Active Hospital Problems    Diagnosis  POA    **Bacterial pneumonia [J15.9]  Yes    Sepsis, unspecified organism [A41.9]  Yes    Influenza A [J10.1]  Yes    Acute pain of left knee [M25.562]  Yes    Type 2 diabetes mellitus with hyperglycemia, with long-term current use of insulin [E11.65, Z79.4]  Not Applicable    CKD (chronic kidney disease) stage 3, GFR 30-59 ml/min [N18.30]  Yes    Acute respiratory failure with hypoxia [J96.01]  Yes      Resolved Hospital Problems   No resolved problems to display.       74 y.o. female admitted with Bacterial pneumonia.    Assessment and plan  1.  Acute respiratory failure with hypoxia, bacterial pneumonia, recent history of flu infection, superimposed bacterial pneumonia, treated with antibiotics, continue Rocephin, add doxycycline for atypical coverage.    2.  Acute pain in the left knee, imaging consistent with arthritis, orthopedics evaluation.    3.  Acute on chronic kidney injury, renal function has improved, appears to be close to baseline.  Avoid nephrotoxic medications.    4.  Diabetes mellitus, continue Accu-Cheks and sliding scale insulin coverage.    5.  Acidosis, continue to monitor, labs.    6.  CODE STATUS is full code.  Further plans based on hospital course.      Raleigh Boateng MD  [facility]  02/11/25  14:09 EST    Electronically signed by Raleigh Boateng MD at 02/11/25 1413       Consult Notes (last 24 hours)  Notes from 02/10/25  1746 through 02/11/25 1746   No notes of this type exist for this encounter.

## 2025-02-11 NOTE — PROGRESS NOTES
Name: Chrissy Gutierrez ADMIT: 2/10/2025   : 1950  PCP: Jai Steven MD    MRN: [id number] LOS: 1 days   AGE/SEX: 74 y.o. female  ROOM: [facility]     Subjective   Subjective   Patient is seen at bedside, she has tachycardia, at the time of my evaluation, does not appear to be in any kind of distress.       Objective   Objective   Vital Signs  Temp:  [99.2 °F (37.3 °C)-102.3 °F (39.1 °C)] 99.2 °F (37.3 °C)  Heart Rate:  [] 117  Resp:  [15-20] 16  BP: (126-149)/(66-81) 149/66  SpO2:  [93 %-98 %] 98 %  on  Flow (L/min) (Oxygen Therapy):  [4] 4;   Device (Oxygen Therapy): nasal cannula  Body mass index is 31.95 kg/m².  Physical Exam  General, awake and alert.  Head and ENT, normocephalic and atraumatic.  Lungs, symmetric expansion, equal air entry bilaterally.  Heart, regular rate and rhythm.  Abdomen, soft and nontender.  Extremities, no clubbing or cyanosis.  Neuro, no focal deficits.  Skin: Warm and no rash.  Psych, normal mood and affect.  Musculoskeletal, joint examination is grossly normal.      Results Review     I reviewed the patient's new clinical results.  Results from last 7 days   Lab Units 25  0654 02/10/25  0748 02/10/25  0357   WBC 10*3/mm3 17.81* 16.07* 17.10*   HEMOGLOBIN g/dL 10.0* 10.3* 10.4*   PLATELETS 10*3/mm3 408 411 420     Results from last 7 days   Lab Units 25  0654 02/10/25  0748 02/10/25  0357   SODIUM mmol/L 141 137 136   POTASSIUM mmol/L 4.5 4.5 4.8   CHLORIDE mmol/L 109* 105 104   CO2 mmol/L 16.4* 15.2* 14.5*   BUN mg/dL 36* 53* 56*   CREATININE mg/dL 1.21* 1.58* 1.73*   GLUCOSE mg/dL 98 209* 234*   EGFR mL/min/1.73 47.1* 34.2* 30.7*     Results from last 7 days   Lab Units 02/10/25  0357   ALBUMIN g/dL 3.2*   BILIRUBIN mg/dL 0.3   ALK PHOS U/L 84   AST (SGOT) U/L 77*   ALT (SGPT) U/L 108*     Results from last 7 days   Lab Units 25  0654 02/10/25  0748 02/10/25  0357   CALCIUM mg/dL 8.6 8.6 8.8   ALBUMIN g/dL  --   --  3.2*     Results from last 7  days   Lab Units 02/10/25  0357   PROCALCITONIN ng/mL 0.95*   LACTATE mmol/L 1.2     Glucose   Date/Time Value Ref Range Status   02/11/2025 1210 317 (H) 70 - 130 mg/dL Final   02/11/2025 0757 96 70 - 130 mg/dL Final   02/10/2025 2103 158 (H) 70 - 130 mg/dL Final   02/10/2025 1634 181 (H) 70 - 130 mg/dL Final   02/10/2025 1143 229 (H) 70 - 130 mg/dL Final   02/10/2025 0740 199 (H) 70 - 130 mg/dL Final       XR Knee 3 View Bilateral    Result Date: 2/10/2025  1. Moderately severe degenerative arthritis of both knees. 2. No fractures are seen.   This report was finalized on 2/10/2025 7:16 AM by Dr. Ramiro Groves M.D on Workstation: [email or web address]      XR Chest 1 View    Result Date: 2/10/2025  1. Mild atelectasis or pneumonia of the right lung base.   This report was finalized on 2/10/2025 7:12 AM by Dr. Ramiro Groves M.D on Workstation: [id number]       I have personally reviewed all medications:  Scheduled Medications  [Held by provider] amLODIPine, 10 mg, Oral, Q24H   And  [Held by provider] lisinopril, 40 mg, Oral, Q24H  aspirin, 81 mg, Oral, Daily  carvedilol, 12.5 mg, Oral, BID With Meals  cefTRIAXone, 2,000 mg, Intravenous, Q24H  empagliflozin, 25 mg, Oral, Daily  enoxaparin, 40 mg, Subcutaneous, Q24H  glipizide, 5 mg, Oral, QAM AC  insulin glargine, 10 Units, Subcutaneous, Daily  insulin lispro, 2-9 Units, Subcutaneous, 4x Daily AC & at Bedtime  ipratropium-albuterol, 3 mL, Nebulization, Q6H While Awake - RT  [START ON 2/12/2025] rosuvastatin, 10 mg, Oral, Nightly  [START ON 2/12/2025] sodium bicarbonate, 650 mg, Oral, Nightly  sodium chloride, 10 mL, Intravenous, Q12H  sodium zirconium cyclosilicate, 5 g, Oral, Every Other Day    Infusions   Diet  Diet: Cardiac; Healthy Heart (2-3 Na+); Fluid Consistency: Thin (IDDSI 0)    I have personally reviewed:  [x]  Laboratory   [x]  Microbiology   [x]  Radiology   [x]  EKG/Telemetry  [x]  Cardiology/Vascular   []  Pathology    []  Records       Assessment/Plan      Active Hospital Problems    Diagnosis  POA    **Bacterial pneumonia [J15.9]  Yes    Sepsis, unspecified organism [A41.9]  Yes    Influenza A [J10.1]  Yes    Acute pain of left knee [M25.562]  Yes    Type 2 diabetes mellitus with hyperglycemia, with long-term current use of insulin [E11.65, Z79.4]  Not Applicable    CKD (chronic kidney disease) stage 3, GFR 30-59 ml/min [N18.30]  Yes    Acute respiratory failure with hypoxia [J96.01]  Yes      Resolved Hospital Problems   No resolved problems to display.       74 y.o. female admitted with Bacterial pneumonia.    Assessment and plan  1.  Acute respiratory failure with hypoxia, bacterial pneumonia, recent history of flu infection, superimposed bacterial pneumonia, treated with antibiotics, continue Rocephin, add doxycycline for atypical coverage.    2.  Acute pain in the left knee, imaging consistent with arthritis, orthopedics evaluation.    3.  Acute on chronic kidney injury, renal function has improved, appears to be close to baseline.  Avoid nephrotoxic medications.    4.  Diabetes mellitus, continue Accu-Cheks and sliding scale insulin coverage.    5.  Acidosis, continue to monitor, labs.    6.  CODE STATUS is full code.  Further plans based on hospital course.      Raleigh Boateng MD  [facility]  02/11/25  14:09 EST

## 2025-02-11 NOTE — THERAPY EVALUATION
Patient Name: Chrissy Gutierrez  : 1950    MRN: [id number]                              Today's Date: 2025       Admit Date: 2/10/2025    Visit Dx:     ICD-10-CM ICD-9-CM   1. Acute respiratory failure with hypoxia  J96.01 518.81   2. Pneumonia of right lower lobe due to infectious organism  J18.9 486   3. Acute pain of both knees  M25.561 338.19    M25.562 719.46   4. Generalized weakness  R53.1 780.79   5. SONI (acute kidney injury)  N17.9 584.9   6. Acute UTI  N39.0 599.0     Patient Active Problem List   Diagnosis    Right lower lobe pneumonia    Sepsis, unspecified organism    Influenza A    Acute pain of left knee    Bacterial pneumonia    Type 2 diabetes mellitus with hyperglycemia, with long-term current use of insulin    CKD (chronic kidney disease) stage 3, GFR 30-59 ml/min    Acute respiratory failure with hypoxia     Past Medical History:   Diagnosis Date    Cancer     right kidney    Chronic kidney disease     Hypertension     Low back pain     Osteoarthritis     Peripheral neuropathy      Past Surgical History:   Procedure Laterality Date    BACK SURGERY      EPIDURAL BLOCK      KIDNEY SURGERY Right 2017    REMOVED RIGHT KIDNEY    LUMBAR DISCECTOMY FUSION INSTRUMENTATION N/A 2017    Procedure: 1 LEVEL LAMINECTOMY DECOMPRESSION L4 5 EXCISION FACET CYST;  Surgeon: Negrito Oconnell DO;  Location: [facility];  Service:     TUBAL ABDOMINAL LIGATION      WISDOM TOOTH EXTRACTION        General Information       Row Name 25 1315          Physical Therapy Time and Intention    Document Type evaluation  -CS     Mode of Treatment individual therapy;physical therapy  -CS       Row Name 25 1315          General Information    Patient Profile Reviewed yes  -CS     Prior Level of Function independent:;all household mobility;ADL's  -CS     Existing Precautions/Restrictions fall  -CS       Row Name 25 1315          Living Environment    People in Home child(harpal), adult  -CS        Row Name 02/11/25 1315          Home Main Entrance    Number of Stairs, Main Entrance none  -CS       Row Name 02/11/25 1315          Cognition    Orientation Status (Cognition) oriented x 3  -CS       Row Name 02/11/25 1315          Safety Issues/Impairments Affecting Functional Mobility    Safety Issues Affecting Function (Mobility) ability to follow commands;problem-solving  -CS     Impairments Affecting Function (Mobility) balance;pain;shortness of breath;strength;endurance/activity tolerance  -CS               User Key  (r) = Recorded By, (t) = Taken By, (c) = Cosigned By      Initials Name Provider Type    CS Shen Gutierrez, PT Physical Therapist                   Mobility       Row Name 02/11/25 1317          Bed Mobility    Bed Mobility supine-sit;sit-supine  -CS     Supine-Sit [place] (Bed Mobility) maximum assist (25% patient effort)  -CS     Sit-Supine [place] (Bed Mobility) maximum assist (25% patient effort)  -CS     Comment, (Bed Mobility) Decreased pt effort, MaxA for seated EOB  -CS       Row Name 02/11/25 1317          Gait/Stairs (Locomotion)    Patient was able to Ambulate no, other medical factors prevent ambulation  -CS     Reason Patient was unable to Ambulate Uncontrolled Pain;Excessive Weakness  -CS     Comment, (Gait/Stairs) Encouraged pt to ambulate this visit but unwilling due to pain levels and motivation  -CS               User Key  (r) = Recorded By, (t) = Taken By, (c) = Cosigned By      Initials Name Provider Type    CS Shen Gutierrez, PT Physical Therapist                   Obj/Interventions       Row Name 02/11/25 1318          Range of Motion Comprehensive    Comment, General Range of Motion Generalized decreased ROM due to weakness  -CS       Row Name 02/11/25 1318          Strength Comprehensive (MMT)    Comment, General Manual Muscle Testing (MMT) Assessment Could not formally test, displaying 2/5 strength this visit but noted w/ decreased effort.  -CS       Row Name  02/11/25 1318          Balance    Balance Assessment sitting static balance  -CS     Static Sitting Balance maximum assist  -CS     Position, Sitting Balance supported;sitting edge of bed  -CS     Balance Interventions sitting;supported;static  -CS               User Key  (r) = Recorded By, (t) = Taken By, (c) = Cosigned By      Initials Name Provider Type    CS Shen Gutierrez, PT Physical Therapist                   Goals/Plan       Row Name 02/11/25 1322          Bed Mobility Goal 1 (PT)    Activity/Assistive Device (Bed Mobility Goal 1, PT) bed mobility activities, all  -CS     [place] Level/Cues Needed (Bed Mobility Goal 1, PT) standby assist  -CS     Time Frame (Bed Mobility Goal 1, PT) short term goal (STG);10 days  -CS       Row Name 02/11/25 1322          Transfer Goal 1 (PT)    Activity/Assistive Device (Transfer Goal 1, PT) sit-to-stand/stand-to-sit  -CS     [place] Level/Cues Needed (Transfer Goal 1, PT) contact guard required  -CS     Time Frame (Transfer Goal 1, PT) short term goal (STG);10 days  -CS       Row Name 02/11/25 1322          Gait Training Goal 1 (PT)    Activity/Assistive Device (Gait Training Goal 1, PT) gait (walking locomotion)  -CS     [place] Level (Gait Training Goal 1, PT) contact guard required  -CS     Distance (Gait Training Goal 1, PT) 50  -CS     Time Frame (Gait Training Goal 1, PT) short term goal (STG);10 days  -CS               User Key  (r) = Recorded By, (t) = Taken By, (c) = Cosigned By      Initials Name Provider Type    CS Shen Gutierrez PT Physical Therapist                   Clinical Impression       Row Name 02/11/25 1320          Pain    Pretreatment Pain Rating 10/10  -CS     Posttreatment Pain Rating 10/10  -CS     Pain Location knee  -CS     Pain Side/Orientation bilateral  -CS     Pain Management Interventions activity modification encouraged  -CS     Response to Pain Interventions activity participation with increased pain  -CS       Row Name  02/11/25 1320          Plan of Care Review    Plan of Care Reviewed With patient  -CS     Progress no change  -CS     Outcome Evaluation Pt is a 74 y.o. female admitted to [facility] with c/o generalized weakness on 2/10/2025, tested (+) for flu. Pt found to be hypoxic and hypotensive at admission. Prior to hospital admission, pt reports residing at home w/ her son, chineduies DUKE. Prior to hospital stay, pt was independent w/ ADLs and utilized Rwx AD at baseline. Pt reports bilateral knee pain at 10/10 this visit and decreased motivation to participate in PT. Pt required MaxA for bed mobility, and would not participate any further during today's session due to bilateral knee pain. Pt w/ decreased effort during today's evaluation and required frequent cueing to stay attentive to session. At end of session pt verbally states she wants to continue PT while admitted due her functional deficits below her baseline. Pt will benefit from skilled PT to address the previous deficits and improve overall safety with functional mobility. Anticipate pt will D/C to SNF as she is well below her baseline and unable to ambulate, do anticipate pt to progress while admitted and will likely change recommendation to home w/ assist, will continue to monitor pending progress.  -CS       Row Name 02/11/25 1320          Therapy Assessment/Plan (PT)    Rehab Potential (PT) fair  -CS     Criteria for Skilled Interventions Met (PT) yes  -CS     Therapy Frequency (PT) 5 times/wk  -CS       Row Name 02/11/25 1320          Positioning and Restraints    Pre-Treatment Position in bed  -CS     Post Treatment Position bed  -CS     In Bed notified nsg;exit alarm on;encouraged to call for assist;call light within reach  -CS               User Key  (r) = Recorded By, (t) = Taken By, (c) = Cosigned By      Initials Name Provider Type    Shen Vazquez, PT Physical Therapist                   Outcome Measures       Row Name 02/11/25 1322 02/11/25 0800       How  much help from another person do you currently need...    Turning from your back to your side while in flat bed without using bedrails? 2  -CS 4  -AW    Moving from lying on back to sitting on the side of a flat bed without bedrails? 2  -CS 3  -AW    Moving to and from a bed to a chair (including a wheelchair)? 2  -CS 3  -AW    Standing up from a chair using your arms (e.g., wheelchair, bedside chair)? 2  -CS 3  -AW    Climbing 3-5 steps with a railing? 1  -CS 3  -AW    To walk in hospital room? 1  -CS 3  -AW    AM-PAC 6 Clicks Score (PT) 10  -CS 19  -AW              User Key  (r) = Recorded By, (t) = Taken By, (c) = Cosigned By      Initials Name Provider Type    AW Yanet Johns, RN Registered Nurse    Shen Vazquez, PT Physical Therapist                                 Physical Therapy Education       Title: PT OT SLP Therapies (In Progress)       Topic: Physical Therapy (In Progress)       Point: Mobility training (In Progress)       Learning Progress Summary            Patient Acceptance, E, NR by  at 2/11/2025 1323                      Point: Home exercise program (In Progress)       Learning Progress Summary            Patient Acceptance, E, NR by  at 2/11/2025 1323                                      User Key       Initials Effective Dates Name Provider Type Discipline     09/06/24 -  Shen Gutierrez, ISABEL Physical Therapist PT                  PT Recommendation and Plan     Progress: no change  Outcome Evaluation: Pt is a 74 y.o. female admitted to [facility] with c/o generalized weakness on 2/10/2025, tested (+) for flu. Pt found to be hypoxic and hypotensive at admission. Prior to hospital admission, pt reports residing at home w/ her son, stella WALL. Prior to hospital stay, pt was independent w/ ADLs and utilized Rwx AD at baseline. Pt reports bilateral knee pain at 10/10 this visit and decreased motivation to participate in PT. Pt required MaxA for bed mobility, and would not participate any further  during today's session due to bilateral knee pain. Pt w/ decreased effort during today's evaluation and required frequent cueing to stay attentive to session. At end of session pt verbally states she wants to continue PT while admitted due her functional deficits below her baseline. Pt will benefit from skilled PT to address the previous deficits and improve overall safety with functional mobility. Anticipate pt will D/C to SNF as she is well below her baseline and unable to ambulate, do anticipate pt to progress while admitted and will likely change recommendation to home w/ assist, will continue to monitor pending progress.     Time Calculation:         PT Charges       Row Name 02/11/25 1323             Time Calculation    Start Time 1150  -CS      Stop Time 1205  -CS      Time Calculation (min) 15 min  -CS      PT Received On 02/11/25  -CS      PT - Next Appointment 02/12/25  -CS      PT Goal Re-Cert Due Date 02/18/25  -CS         Timed Charges    54432 - PT Therapeutic Activity Minutes 10  -CS         Untimed Charges    PT Eval/Re-eval Minutes 5  -CS         Total Minutes    Timed Charges Total Minutes 10  -CS      Untimed Charges Total Minutes 5  -CS       Total Minutes 15  -CS                User Key  (r) = Recorded By, (t) = Taken By, (c) = Cosigned By      Initials Name Provider Type    CS Shen Gutierrez, PT Physical Therapist                  Therapy Charges for Today       Code Description Service Date Service Provider Modifiers Qty    62343249186  PT THERAPEUTIC ACT EA 15 MIN 2/11/2025 Shen Gutierrez, PT GP 1    88560140403 HC PT EVAL LOW COMPLEXITY 2 2/11/2025 Shen Gutierrez, PT GP 1            PT G-Codes  AM-PAC 6 Clicks Score (PT): 10  PT Discharge Summary  Anticipated Discharge Disposition (PT): skilled nursing facility, home with assist    Shen Gutierrez PT  2/11/2025

## 2025-02-11 NOTE — PLAN OF CARE
Goal Outcome Evaluation:  Plan of Care Reviewed With: patient        Progress: no change  Outcome Evaluation: Pt is a 74 y.o. female admitted to [facility] with c/o generalized weakness on 2/10/2025, tested (+) for flu. Pt found to be hypoxic and hypotensive at admission. Prior to hospital admission, pt reports residing at home w/ her son, denies [employer]. Prior to hospital stay, pt was independent w/ ADLs and utilized Rwx AD at baseline. Pt reports bilateral knee pain at 10/10 this visit and decreased motivation to participate in PT. Pt required MaxA for bed mobility, and would not participate any further during today's session due to bilateral knee pain. Pt w/ decreased effort during today's evaluation and required frequent cueing to stay attentive to session. At end of session pt verbally states she wants to continue PT while admitted due her functional deficits below her baseline. Pt will benefit from skilled PT to address the previous deficits and improve overall safety with functional mobility. Anticipate pt will D/C to SNF as she is well below her baseline and unable to ambulate, do anticipate pt to progress while admitted and will likely change recommendation to home w/ assist, will continue to monitor pending progress.    Anticipated Discharge Disposition (PT): skilled nursing facility, home with assist

## 2025-02-11 NOTE — CASE MANAGEMENT/SOCIAL WORK
"Discharge Planning Assessment  [facility]     Patient Name: Chrissy Gutierrez  MRN: [id number]  Today's Date: 2/11/2025    Admit Date: 2/10/2025    Plan: Patient plans to discharge to sub-acute rehab with goal of returning home. Requests referrals to [facility], [facility], and [facility]. Referrals sent/pending. Will likely require ambulance transport.   Discharge Needs Assessment       Row Name 02/11/25 1605       Living Environment    People in Home child(harpal), adult    Name(s) of People in Home Percy (son)    Current Living Arrangements home    Potentially Unsafe Housing Conditions none    Primary Care Provided by self;child(harpal)    Provides Primary Care For no one, unable/limited ability to care for self    Family Caregiver if Needed child(harpal), adult    Family Caregiver Names Percy (son)    Quality of Family Relationships helpful;involved;supportive    Able to Return to Prior Arrangements yes    Living Arrangement Comments Patient resides with son in private residence with \"some\" steps to enter.       Resource/Environmental Concerns    Resource/Environmental Concerns none    Transportation Concerns none       Transition Planning    Patient/Family Anticipates Transition to inpatient rehabilitation facility    Patient/Family Anticipated Services at Transition rehabilitation services    Transportation Anticipated health plan transportation       Discharge Needs Assessment    Equipment Currently Used at Home walker, rolling    Concerns to be Addressed discharge planning    Anticipated Changes Related to Illness none    Equipment Needed After Discharge none    Discharge Facility/Level of Care Needs rehabilitation facility    Provided Post Acute Provider List? Yes    Post Acute Provider List Inpatient Rehab    Provided Post Acute Provider Quality & Resource List? Yes    Post Acute Provider Quality and Resource List Inpatient Rehab    Delivered To Support Person;Patient    Support Person " Percy (son)    Method of Delivery In person    Offered/Gave Vendor List yes  Patient Choice List given.    Patient's Choice of Community Agency(s) [facility], [facility] and [facility].    Current Discharge Risk physical impairment                   Discharge Plan       Row Name 02/11/25 1602       Plan    Plan Patient plans to discharge to sub-acute rehab with goal of returning home. [facility], [facility], and [place]. Referrals sent/pending. Will likely require ambulance transport.    Patient/Family in Agreement with Plan yes    Plan Comments Patient plans to discharge to sub-acute rehab, with goal of returning home at prior level of function. Requests referrals to [facility], [facility], and [place]. Referrals sent/pending. At baseline, patient is MOD I with use of rolling walker. She lives in private residence with her son, Percy, who is involved/supportive. She does have steps to enter the home. She typically drives. She reports having been to rehab at [facility] in the past. Denies home health history. Reports a recent fall, resulting in injury to right shoulder. Reports that she uses [facility] for maintenance medications and [facility] on [address] for immediate fills. Denies issues obtaining/affording medications. She will likely require ambulance or wheelchair van transport at discharge, given current weakness. CCP to follow rehab referrals.                  Continued Care and Services - Admitted Since 2/10/2025    No active coordination exists for this encounter.          Demographic Summary       Row Name 02/11/25 1309       General Information    Admission Type inpatient    Arrived From home    Required Notices Provided Important Message from Medicare  Verified IMM on chart.    Referral Source admission list;emergency department     Reason for Consult discharge planning    Preferred Language English    General Information Comments Facesheet verified. Role of CCP explained. Appropriate PPE worn at all times.       Contact Information    Permission Granted to Share Info With family/designee  SonPercy, at bedside.                   Functional Status       Row Name 02/11/25 1604       Functional Status    Usual Activity Tolerance good    Current Activity Tolerance poor       Functional Status, IADL    Medications assistive equipment and person    Meal Preparation assistive equipment and person    Housekeeping assistive equipment and person    Laundry assistive equipment and person    Shopping assistive equipment and person    IADL Comments At baseline, patient is MOD I with use of rolling walker. Son assists as needed.                   Psychosocial       Row Name 02/11/25 1602       Coping/Stress    Patient Personal Strengths strong support system    Sources of Support adult child(harpal)       Developmental Stage (Eriksson's Stages of Development)    Developmental Stage Stage 8 (65 years-death/Late Adulthood) Integrity vs. Despair                   Abuse/Neglect       Row Name 02/11/25 1600       Personal Safety    Physical Signs of Abuse Present no                   Legal    No documentation.                  Substance Abuse    No documentation.                  Patient Forms    No documentation.                     Mark Pritchard RN

## 2025-02-12 ENCOUNTER — ANESTHESIA (OUTPATIENT)
Dept: PERIOP | Facility: HOSPITAL | Age: 75
End: 2025-02-12
Payer: MEDICARE

## 2025-02-12 ENCOUNTER — APPOINTMENT (OUTPATIENT)
Dept: CT IMAGING | Facility: HOSPITAL | Age: 75
DRG: 853 | End: 2025-02-12
Payer: MEDICARE

## 2025-02-12 ENCOUNTER — ANESTHESIA EVENT (OUTPATIENT)
Dept: PERIOP | Facility: HOSPITAL | Age: 75
End: 2025-02-12
Payer: MEDICARE

## 2025-02-12 LAB
ALBUMIN SERPL-MCNC: 2.8 G/DL (ref 3.5–5.2)
ALBUMIN/GLOB SERPL: 0.7 G/DL
ALP SERPL-CCNC: 80 U/L (ref 39–117)
ALT SERPL W P-5'-P-CCNC: 169 U/L (ref 1–33)
ANION GAP SERPL CALCULATED.3IONS-SCNC: 15 MMOL/L (ref 5–15)
APPEARANCE FLD: ABNORMAL
AST SERPL-CCNC: 209 U/L (ref 1–32)
BACTERIA SPEC AEROBE CULT: ABNORMAL
BASOPHILS # BLD AUTO: 0.02 10*3/MM3 (ref 0–0.2)
BASOPHILS NFR BLD AUTO: 0.1 % (ref 0–1.5)
BILIRUB SERPL-MCNC: 0.3 MG/DL (ref 0–1.2)
BUN SERPL-MCNC: 31 MG/DL (ref 8–23)
BUN/CREAT SERPL: 24.8 (ref 7–25)
CALCIUM SPEC-SCNC: 8.8 MG/DL (ref 8.6–10.5)
CHLORIDE SERPL-SCNC: 108 MMOL/L (ref 98–107)
CO2 SERPL-SCNC: 18 MMOL/L (ref 22–29)
COLOR FLD: ABNORMAL
CREAT SERPL-MCNC: 1.25 MG/DL (ref 0.57–1)
DEPRECATED RDW RBC AUTO: 38.6 FL (ref 37–54)
EGFRCR SERPLBLD CKD-EPI 2021: 45.3 ML/MIN/1.73
EOSINOPHIL # BLD AUTO: 0.01 10*3/MM3 (ref 0–0.4)
EOSINOPHIL NFR BLD AUTO: 0.1 % (ref 0.3–6.2)
EOSINOPHIL NFR FLD MANUAL: 2 %
ERYTHROCYTE [DISTWIDTH] IN BLOOD BY AUTOMATED COUNT: 12.2 % (ref 12.3–15.4)
GLOBULIN UR ELPH-MCNC: 4.2 GM/DL
GLUCOSE BLDC GLUCOMTR-MCNC: 106 MG/DL (ref 70–130)
GLUCOSE BLDC GLUCOMTR-MCNC: 250 MG/DL (ref 70–130)
GLUCOSE BLDC GLUCOMTR-MCNC: 81 MG/DL (ref 70–130)
GLUCOSE BLDC GLUCOMTR-MCNC: 87 MG/DL (ref 70–130)
GLUCOSE BLDC GLUCOMTR-MCNC: 94 MG/DL (ref 70–130)
GLUCOSE SERPL-MCNC: 80 MG/DL (ref 65–99)
HCT VFR BLD AUTO: 28.2 % (ref 34–46.6)
HGB BLD-MCNC: 9.4 G/DL (ref 12–15.9)
IMM GRANULOCYTES # BLD AUTO: 0.21 10*3/MM3 (ref 0–0.05)
IMM GRANULOCYTES NFR BLD AUTO: 1.2 % (ref 0–0.5)
LYMPHOCYTES # BLD AUTO: 1.3 10*3/MM3 (ref 0.7–3.1)
LYMPHOCYTES NFR BLD AUTO: 7.2 % (ref 19.6–45.3)
LYMPHOCYTES NFR FLD MANUAL: 4 %
MCH RBC QN AUTO: 29.2 PG (ref 26.6–33)
MCHC RBC AUTO-ENTMCNC: 33.3 G/DL (ref 31.5–35.7)
MCV RBC AUTO: 87.6 FL (ref 79–97)
METHOD: ABNORMAL
MONOCYTES # BLD AUTO: 1.62 10*3/MM3 (ref 0.1–0.9)
MONOCYTES NFR BLD AUTO: 9 % (ref 5–12)
NEUTROPHILS NFR BLD AUTO: 14.84 10*3/MM3 (ref 1.7–7)
NEUTROPHILS NFR BLD AUTO: 82.4 % (ref 42.7–76)
NEUTROPHILS NFR FLD MANUAL: 94 %
NRBC BLD AUTO-RTO: 0 /100 WBC (ref 0–0.2)
NUC CELL # FLD: ABNORMAL /MM3
PLATELET # BLD AUTO: 387 10*3/MM3 (ref 140–450)
PMV BLD AUTO: 9 FL (ref 6–12)
POTASSIUM SERPL-SCNC: 4.2 MMOL/L (ref 3.5–5.2)
PROT SERPL-MCNC: 7 G/DL (ref 6–8.5)
RBC # BLD AUTO: 3.22 10*6/MM3 (ref 3.77–5.28)
RBC # FLD AUTO: ABNORMAL /MM3
SODIUM SERPL-SCNC: 141 MMOL/L (ref 136–145)
WBC NRBC COR # BLD AUTO: 18 10*3/MM3 (ref 3.4–10.8)

## 2025-02-12 PROCEDURE — 25010000002 PROPOFOL 200 MG/20ML EMULSION: Performed by: NURSE ANESTHETIST, CERTIFIED REGISTERED

## 2025-02-12 PROCEDURE — 25010000002 FENTANYL CITRATE (PF) 50 MCG/ML SOLUTION: Performed by: NURSE ANESTHETIST, CERTIFIED REGISTERED

## 2025-02-12 PROCEDURE — 3E1U48Z IRRIGATION OF JOINTS USING IRRIGATING SUBSTANCE, PERCUTANEOUS ENDOSCOPIC APPROACH: ICD-10-PCS | Performed by: ORTHOPAEDIC SURGERY

## 2025-02-12 PROCEDURE — 25010000002 CEFAZOLIN PER 500 MG: Performed by: NURSE ANESTHETIST, CERTIFIED REGISTERED

## 2025-02-12 PROCEDURE — 85025 COMPLETE CBC W/AUTO DIFF WBC: CPT | Performed by: INTERNAL MEDICINE

## 2025-02-12 PROCEDURE — 25010000002 FENTANYL CITRATE (PF) 50 MCG/ML SOLUTION: Performed by: STUDENT IN AN ORGANIZED HEALTH CARE EDUCATION/TRAINING PROGRAM

## 2025-02-12 PROCEDURE — 94799 UNLISTED PULMONARY SVC/PX: CPT

## 2025-02-12 PROCEDURE — 25010000002 DEXAMETHASONE PER 1 MG: Performed by: NURSE ANESTHETIST, CERTIFIED REGISTERED

## 2025-02-12 PROCEDURE — 87075 CULTR BACTERIA EXCEPT BLOOD: CPT | Performed by: ORTHOPAEDIC SURGERY

## 2025-02-12 PROCEDURE — 63710000001 INSULIN LISPRO (HUMAN) PER 5 UNITS: Performed by: ORTHOPAEDIC SURGERY

## 2025-02-12 PROCEDURE — 25010000002 CEFAZOLIN PER 500 MG: Performed by: ORTHOPAEDIC SURGERY

## 2025-02-12 PROCEDURE — 94761 N-INVAS EAR/PLS OXIMETRY MLT: CPT

## 2025-02-12 PROCEDURE — 87070 CULTURE OTHR SPECIMN AEROBIC: CPT | Performed by: ORTHOPAEDIC SURGERY

## 2025-02-12 PROCEDURE — 87205 SMEAR GRAM STAIN: CPT | Performed by: ORTHOPAEDIC SURGERY

## 2025-02-12 PROCEDURE — 94760 N-INVAS EAR/PLS OXIMETRY 1: CPT

## 2025-02-12 PROCEDURE — 89051 BODY FLUID CELL COUNT: CPT | Performed by: ORTHOPAEDIC SURGERY

## 2025-02-12 PROCEDURE — 25010000002 CEFTRIAXONE PER 250 MG: Performed by: NURSE PRACTITIONER

## 2025-02-12 PROCEDURE — 0S9D0ZZ DRAINAGE OF LEFT KNEE JOINT, OPEN APPROACH: ICD-10-PCS | Performed by: ORTHOPAEDIC SURGERY

## 2025-02-12 PROCEDURE — 25010000002 ONDANSETRON PER 1 MG: Performed by: NURSE ANESTHETIST, CERTIFIED REGISTERED

## 2025-02-12 PROCEDURE — 88305 TISSUE EXAM BY PATHOLOGIST: CPT | Performed by: ORTHOPAEDIC SURGERY

## 2025-02-12 PROCEDURE — 87176 TISSUE HOMOGENIZATION CULTR: CPT | Performed by: ORTHOPAEDIC SURGERY

## 2025-02-12 PROCEDURE — 82948 REAGENT STRIP/BLOOD GLUCOSE: CPT

## 2025-02-12 PROCEDURE — 25810000003 LACTATED RINGERS PER 1000 ML: Performed by: STUDENT IN AN ORGANIZED HEALTH CARE EDUCATION/TRAINING PROGRAM

## 2025-02-12 PROCEDURE — 94664 DEMO&/EVAL PT USE INHALER: CPT

## 2025-02-12 PROCEDURE — 25010000002 LIDOCAINE 2% SOLUTION: Performed by: NURSE ANESTHETIST, CERTIFIED REGISTERED

## 2025-02-12 PROCEDURE — 80053 COMPREHEN METABOLIC PANEL: CPT | Performed by: INTERNAL MEDICINE

## 2025-02-12 PROCEDURE — 25010000002 PHENYLEPHRINE 10 MG/ML SOLUTION: Performed by: NURSE ANESTHETIST, CERTIFIED REGISTERED

## 2025-02-12 RX ORDER — PROMETHAZINE HYDROCHLORIDE 25 MG/1
25 TABLET ORAL ONCE AS NEEDED
Status: DISCONTINUED | OUTPATIENT
Start: 2025-02-12 | End: 2025-02-12 | Stop reason: HOSPADM

## 2025-02-12 RX ORDER — SODIUM CHLORIDE, SODIUM LACTATE, POTASSIUM CHLORIDE, CALCIUM CHLORIDE 600; 310; 30; 20 MG/100ML; MG/100ML; MG/100ML; MG/100ML
9 INJECTION, SOLUTION INTRAVENOUS CONTINUOUS
Status: DISCONTINUED | OUTPATIENT
Start: 2025-02-12 | End: 2025-02-12

## 2025-02-12 RX ORDER — PROPOFOL 10 MG/ML
INJECTION, EMULSION INTRAVENOUS AS NEEDED
Status: DISCONTINUED | OUTPATIENT
Start: 2025-02-12 | End: 2025-02-12 | Stop reason: SURG

## 2025-02-12 RX ORDER — LIDOCAINE HYDROCHLORIDE 10 MG/ML
0.5 INJECTION, SOLUTION INFILTRATION; PERINEURAL ONCE AS NEEDED
Status: DISCONTINUED | OUTPATIENT
Start: 2025-02-12 | End: 2025-02-12 | Stop reason: HOSPADM

## 2025-02-12 RX ORDER — IPRATROPIUM BROMIDE AND ALBUTEROL SULFATE 2.5; .5 MG/3ML; MG/3ML
3 SOLUTION RESPIRATORY (INHALATION) ONCE AS NEEDED
Status: DISCONTINUED | OUTPATIENT
Start: 2025-02-12 | End: 2025-02-12 | Stop reason: HOSPADM

## 2025-02-12 RX ORDER — HYDROCODONE BITARTRATE AND ACETAMINOPHEN 7.5; 325 MG/1; MG/1
1 TABLET ORAL EVERY 4 HOURS PRN
Status: DISCONTINUED | OUTPATIENT
Start: 2025-02-12 | End: 2025-02-12 | Stop reason: HOSPADM

## 2025-02-12 RX ORDER — DIPHENHYDRAMINE HYDROCHLORIDE 50 MG/ML
12.5 INJECTION INTRAMUSCULAR; INTRAVENOUS
Status: DISCONTINUED | OUTPATIENT
Start: 2025-02-12 | End: 2025-02-12 | Stop reason: HOSPADM

## 2025-02-12 RX ORDER — PHENYLEPHRINE HYDROCHLORIDE 10 MG/ML
INJECTION INTRAVENOUS AS NEEDED
Status: DISCONTINUED | OUTPATIENT
Start: 2025-02-12 | End: 2025-02-12 | Stop reason: SURG

## 2025-02-12 RX ORDER — DOXYCYCLINE 100 MG/1
100 CAPSULE ORAL EVERY 12 HOURS SCHEDULED
Status: COMPLETED | OUTPATIENT
Start: 2025-02-12 | End: 2025-02-17

## 2025-02-12 RX ORDER — FUROSEMIDE 40 MG/1
40 TABLET ORAL ONCE
Status: COMPLETED | OUTPATIENT
Start: 2025-02-13 | End: 2025-02-12

## 2025-02-12 RX ORDER — ONDANSETRON 2 MG/ML
INJECTION INTRAMUSCULAR; INTRAVENOUS AS NEEDED
Status: DISCONTINUED | OUTPATIENT
Start: 2025-02-12 | End: 2025-02-12 | Stop reason: SURG

## 2025-02-12 RX ORDER — LIDOCAINE HYDROCHLORIDE 20 MG/ML
INJECTION, SOLUTION INFILTRATION; PERINEURAL AS NEEDED
Status: DISCONTINUED | OUTPATIENT
Start: 2025-02-12 | End: 2025-02-12 | Stop reason: SURG

## 2025-02-12 RX ORDER — ONDANSETRON 2 MG/ML
4 INJECTION INTRAMUSCULAR; INTRAVENOUS ONCE AS NEEDED
Status: DISCONTINUED | OUTPATIENT
Start: 2025-02-12 | End: 2025-02-12 | Stop reason: HOSPADM

## 2025-02-12 RX ORDER — DEXAMETHASONE SODIUM PHOSPHATE 4 MG/ML
INJECTION, SOLUTION INTRA-ARTICULAR; INTRALESIONAL; INTRAMUSCULAR; INTRAVENOUS; SOFT TISSUE AS NEEDED
Status: DISCONTINUED | OUTPATIENT
Start: 2025-02-12 | End: 2025-02-12 | Stop reason: SURG

## 2025-02-12 RX ORDER — HYDROMORPHONE HYDROCHLORIDE 1 MG/ML
0.25 INJECTION, SOLUTION INTRAMUSCULAR; INTRAVENOUS; SUBCUTANEOUS
Status: DISCONTINUED | OUTPATIENT
Start: 2025-02-12 | End: 2025-02-12 | Stop reason: HOSPADM

## 2025-02-12 RX ORDER — PROMETHAZINE HYDROCHLORIDE 25 MG/1
25 SUPPOSITORY RECTAL ONCE AS NEEDED
Status: DISCONTINUED | OUTPATIENT
Start: 2025-02-12 | End: 2025-02-12 | Stop reason: HOSPADM

## 2025-02-12 RX ORDER — HYDRALAZINE HYDROCHLORIDE 20 MG/ML
5 INJECTION INTRAMUSCULAR; INTRAVENOUS
Status: DISCONTINUED | OUTPATIENT
Start: 2025-02-12 | End: 2025-02-12 | Stop reason: HOSPADM

## 2025-02-12 RX ORDER — MAGNESIUM HYDROXIDE 1200 MG/15ML
LIQUID ORAL AS NEEDED
Status: DISCONTINUED | OUTPATIENT
Start: 2025-02-12 | End: 2025-02-12 | Stop reason: HOSPADM

## 2025-02-12 RX ORDER — FENTANYL CITRATE 50 UG/ML
INJECTION, SOLUTION INTRAMUSCULAR; INTRAVENOUS AS NEEDED
Status: DISCONTINUED | OUTPATIENT
Start: 2025-02-12 | End: 2025-02-12 | Stop reason: SURG

## 2025-02-12 RX ORDER — ATROPINE SULFATE 0.4 MG/ML
0.4 INJECTION, SOLUTION INTRAMUSCULAR; INTRAVENOUS; SUBCUTANEOUS ONCE AS NEEDED
Status: DISCONTINUED | OUTPATIENT
Start: 2025-02-12 | End: 2025-02-12 | Stop reason: HOSPADM

## 2025-02-12 RX ORDER — SODIUM CHLORIDE 0.9 % (FLUSH) 0.9 %
3 SYRINGE (ML) INJECTION EVERY 12 HOURS SCHEDULED
Status: DISCONTINUED | OUTPATIENT
Start: 2025-02-12 | End: 2025-02-12 | Stop reason: HOSPADM

## 2025-02-12 RX ORDER — SODIUM CHLORIDE 0.9 % (FLUSH) 0.9 %
3-10 SYRINGE (ML) INJECTION AS NEEDED
Status: DISCONTINUED | OUTPATIENT
Start: 2025-02-12 | End: 2025-02-12 | Stop reason: HOSPADM

## 2025-02-12 RX ORDER — HYDROCODONE BITARTRATE AND ACETAMINOPHEN 5; 325 MG/1; MG/1
1 TABLET ORAL ONCE AS NEEDED
Status: DISCONTINUED | OUTPATIENT
Start: 2025-02-12 | End: 2025-02-12 | Stop reason: HOSPADM

## 2025-02-12 RX ORDER — NALOXONE HCL 0.4 MG/ML
0.2 VIAL (ML) INJECTION AS NEEDED
Status: DISCONTINUED | OUTPATIENT
Start: 2025-02-12 | End: 2025-02-12 | Stop reason: HOSPADM

## 2025-02-12 RX ORDER — FENTANYL CITRATE 50 UG/ML
50 INJECTION, SOLUTION INTRAMUSCULAR; INTRAVENOUS ONCE AS NEEDED
Status: COMPLETED | OUTPATIENT
Start: 2025-02-12 | End: 2025-02-12

## 2025-02-12 RX ORDER — LABETALOL HYDROCHLORIDE 5 MG/ML
5 INJECTION, SOLUTION INTRAVENOUS
Status: DISCONTINUED | OUTPATIENT
Start: 2025-02-12 | End: 2025-02-12 | Stop reason: HOSPADM

## 2025-02-12 RX ORDER — FENTANYL CITRATE 50 UG/ML
25 INJECTION, SOLUTION INTRAMUSCULAR; INTRAVENOUS
Status: DISCONTINUED | OUTPATIENT
Start: 2025-02-12 | End: 2025-02-12 | Stop reason: HOSPADM

## 2025-02-12 RX ORDER — EPHEDRINE SULFATE 50 MG/ML
5 INJECTION, SOLUTION INTRAVENOUS ONCE AS NEEDED
Status: DISCONTINUED | OUTPATIENT
Start: 2025-02-12 | End: 2025-02-12 | Stop reason: HOSPADM

## 2025-02-12 RX ORDER — CEFAZOLIN SODIUM 500 MG/2.2ML
INJECTION, POWDER, FOR SOLUTION INTRAMUSCULAR; INTRAVENOUS AS NEEDED
Status: DISCONTINUED | OUTPATIENT
Start: 2025-02-12 | End: 2025-02-12 | Stop reason: SURG

## 2025-02-12 RX ADMIN — CEFAZOLIN 2000 MG: 2 INJECTION, POWDER, FOR SOLUTION INTRAMUSCULAR; INTRAVENOUS at 23:42

## 2025-02-12 RX ADMIN — SODIUM CHLORIDE, POTASSIUM CHLORIDE, SODIUM LACTATE AND CALCIUM CHLORIDE 9 ML/HR: 600; 310; 30; 20 INJECTION, SOLUTION INTRAVENOUS at 12:06

## 2025-02-12 RX ADMIN — FENTANYL CITRATE 25 MCG: 50 INJECTION, SOLUTION INTRAMUSCULAR; INTRAVENOUS at 13:27

## 2025-02-12 RX ADMIN — CARVEDILOL 12.5 MG: 12.5 TABLET, FILM COATED ORAL at 19:01

## 2025-02-12 RX ADMIN — Medication 10 ML: at 08:23

## 2025-02-12 RX ADMIN — FENTANYL CITRATE 50 MCG: 50 INJECTION, SOLUTION INTRAMUSCULAR; INTRAVENOUS at 12:07

## 2025-02-12 RX ADMIN — PROPOFOL 50 MG: 10 INJECTION, EMULSION INTRAVENOUS at 13:15

## 2025-02-12 RX ADMIN — PHENYLEPHRINE HYDROCHLORIDE 200 MCG: 10 INJECTION INTRAVENOUS at 13:17

## 2025-02-12 RX ADMIN — PHENYLEPHRINE HYDROCHLORIDE 200 MCG: 10 INJECTION INTRAVENOUS at 13:29

## 2025-02-12 RX ADMIN — FUROSEMIDE 40 MG: 40 TABLET ORAL at 23:42

## 2025-02-12 RX ADMIN — EMPAGLIFLOZIN 25 MG: 25 TABLET, FILM COATED ORAL at 08:23

## 2025-02-12 RX ADMIN — CARVEDILOL 12.5 MG: 12.5 TABLET, FILM COATED ORAL at 08:23

## 2025-02-12 RX ADMIN — IPRATROPIUM BROMIDE AND ALBUTEROL SULFATE 3 ML: 2.5; .5 SOLUTION RESPIRATORY (INHALATION) at 20:20

## 2025-02-12 RX ADMIN — IPRATROPIUM BROMIDE AND ALBUTEROL SULFATE 3 ML: 2.5; .5 SOLUTION RESPIRATORY (INHALATION) at 06:41

## 2025-02-12 RX ADMIN — ROSUVASTATIN CALCIUM 10 MG: 10 TABLET, FILM COATED ORAL at 20:46

## 2025-02-12 RX ADMIN — INSULIN LISPRO 6 UNITS: 100 INJECTION, SOLUTION INTRAVENOUS; SUBCUTANEOUS at 20:45

## 2025-02-12 RX ADMIN — IPRATROPIUM BROMIDE AND ALBUTEROL SULFATE 3 ML: 2.5; .5 SOLUTION RESPIRATORY (INHALATION) at 10:23

## 2025-02-12 RX ADMIN — CEFAZOLIN 2000 MG: 2 INJECTION, POWDER, FOR SOLUTION INTRAMUSCULAR; INTRAVENOUS at 19:04

## 2025-02-12 RX ADMIN — PHENYLEPHRINE HYDROCHLORIDE 200 MCG: 10 INJECTION INTRAVENOUS at 13:25

## 2025-02-12 RX ADMIN — DOXYCYCLINE 100 MG: 100 CAPSULE ORAL at 20:45

## 2025-02-12 RX ADMIN — PHENYLEPHRINE HYDROCHLORIDE 200 MCG: 10 INJECTION INTRAVENOUS at 13:21

## 2025-02-12 RX ADMIN — SODIUM BICARBONATE 650 MG TABLET 650 MG: at 20:45

## 2025-02-12 RX ADMIN — METHOCARBAMOL TABLETS 500 MG: 500 TABLET, COATED ORAL at 20:45

## 2025-02-12 RX ADMIN — ONDANSETRON 4 MG: 2 INJECTION INTRAMUSCULAR; INTRAVENOUS at 13:27

## 2025-02-12 RX ADMIN — LIDOCAINE HYDROCHLORIDE 100 MG: 20 INJECTION, SOLUTION INFILTRATION; PERINEURAL at 13:10

## 2025-02-12 RX ADMIN — CEFTRIAXONE 2000 MG: 2 INJECTION, POWDER, FOR SOLUTION INTRAMUSCULAR; INTRAVENOUS at 05:27

## 2025-02-12 RX ADMIN — CEFAZOLIN 2 G: 225 INJECTION, POWDER, FOR SOLUTION INTRAMUSCULAR; INTRAVENOUS at 13:33

## 2025-02-12 RX ADMIN — ACETAMINOPHEN 650 MG: 325 TABLET, FILM COATED ORAL at 20:45

## 2025-02-12 RX ADMIN — DEXAMETHASONE SODIUM PHOSPHATE 4 MG: 4 INJECTION, SOLUTION INTRA-ARTICULAR; INTRALESIONAL; INTRAMUSCULAR; INTRAVENOUS; SOFT TISSUE at 13:27

## 2025-02-12 RX ADMIN — FENTANYL CITRATE 25 MCG: 50 INJECTION, SOLUTION INTRAMUSCULAR; INTRAVENOUS at 13:16

## 2025-02-12 RX ADMIN — PROPOFOL 140 MG: 10 INJECTION, EMULSION INTRAVENOUS at 13:10

## 2025-02-12 RX ADMIN — PROPOFOL 50 MG: 10 INJECTION, EMULSION INTRAVENOUS at 13:28

## 2025-02-12 NOTE — PROGRESS NOTES
Orthopaedic Progress Note     In pain at left knee. Aspirate with 59k cells in the setting of culture positive UTI as well as suspected bacterial pneumonia     Hgb:   Vitals:     NAD  Alert  Respirations are nonlabored on 4 L O2   Left knee   Moderate effusion   Globally tender to palpation   SLR is intact   ROM 20 short of full to 35 painful.   Able to DF PF at the ankle   Sensation is intact distally to dense palpation   Foot is wwp      Ranges all other joints through their physiologic ROM with no pain.     IMPRESSION:  Patient is a 74 y.o. female with suspected left knee septic arthritis in the setting of likely bacterial pneumonia and UTI      PLAN:   Admited to: Jde Magdaleno MD  [facility]   NPO   Hold anticoagulation   Pain control   Ice and elevation as needed      --left knee aspirated 2/11/25 under semi sterile conditions with prep and use of superolateral aspiration site. 30 cc of purulent fluid was aspirated from the knee and sent to the lab for cultures, crystals, cell count with diff.      --cultures have yet to result but she has been on antibiotics. She does have uric acid crystals in the joint with 59k nucleated cells. I feel that in the setting of bacterial pneumonia and UTI that this knee is more than likely infected and due to the risk of leaving a septic joint untreated. I have discussed with her undergoing left knee irrigation and debridement      Discussed risks and benefits with her and her son. Risks of surgery and conservative antibiotic treatment discussed. She has been on antibiotics and she has not improved regarding her left knee pain. Risks of surgery bleeding, infection, continued chronic pain, chronic infection, wound healing issues, knee stiffness, damage to surrounding structures, need for further surgery. Risks of anesthesia discussed as well including heart attack, stroke, and even death. She wishes to proceed with left knee irrigation and debridement. All questions  answered no guarantees given.     Arvind Ibarra MD   [facility]   [phone] - [place] Office  [phone] - [facility]

## 2025-02-12 NOTE — PROGRESS NOTES
Name: Chrissy Gutierrez ADMIT: 2/10/2025   : 1950  PCP: Jai Steven MD    MRN: [id number] LOS: 2 days   AGE/SEX: 74 y.o. female  ROOM: [facility] Main OR/MAIN OR     Subjective   Subjective   Patient is seen at bedside today.  No acute overnight events have been reported.     Objective   Objective   Vital Signs  Temp:  [98.4 °F (36.9 °C)-100.4 °F (38 °C)] 99.7 °F (37.6 °C)  Heart Rate:  [] 106  Resp:  [16-25] 20  BP: (105-142)/(45-87) 124/74  SpO2:  [93 %-98 %] 97 %  on  Flow (L/min) (Oxygen Therapy):  [4] 4;   Device (Oxygen Therapy): nasal cannula  Body mass index is 31.95 kg/m².  Physical Exam  General, awake and alert.  Head and ENT, normocephalic and atraumatic.  Lungs, symmetric expansion, equal air entry bilaterally.  Heart, regular rate and rhythm.  Abdomen, soft and nontender.  Extremities, no clubbing or cyanosis.  Neuro, no focal deficits.  Skin: Warm and no rash.  Psych, normal mood and affect.  Musculoskeletal, joint examination is grossly normal.    Copied text material from yesterday's note has been reviewed for appropriate changes and remains accurate as of 25.      Results Review     I reviewed the patient's new clinical results.  Results from last 7 days   Lab Units 2518 2554 02/10/25  0748 02/10/25  0357   WBC 10*3/mm3 18.00* 17.81* 16.07* 17.10*   HEMOGLOBIN g/dL 9.4* 10.0* 10.3* 10.4*   PLATELETS 10*3/mm3 387 408 411 420     Results from last 7 days   Lab Units 25  0654 02/10/25  0748 02/10/25  0357   SODIUM mmol/L 141 141 137 136   POTASSIUM mmol/L 4.2 4.5 4.5 4.8   CHLORIDE mmol/L 108* 109* 105 104   CO2 mmol/L 18.0* 16.4* 15.2* 14.5*   BUN mg/dL 31* 36* 53* 56*   CREATININE mg/dL 1.25* 1.21* 1.58* 1.73*   GLUCOSE mg/dL 80 98 209* 234*   EGFR mL/min/1.73 45.3* 47.1* 34.2* 30.7*     Results from last 7 days   Lab Units 25  0618 02/10/25  0357   ALBUMIN g/dL 2.8* 3.2*   BILIRUBIN mg/dL 0.3 0.3   ALK PHOS U/L 80 84   AST  (SGOT) U/L 209* 77*   ALT (SGPT) U/L 169* 108*     Results from last 7 days   Lab Units 02/12/25  0618 02/11/25  0654 02/10/25  0748 02/10/25  0357   CALCIUM mg/dL 8.8 8.6 8.6 8.8   ALBUMIN g/dL 2.8*  --   --  3.2*     Results from last 7 days   Lab Units 02/10/25  0357   PROCALCITONIN ng/mL 0.95*   LACTATE mmol/L 1.2     Glucose   Date/Time Value Ref Range Status   02/12/2025 1106 94 70 - 130 mg/dL Final   02/12/2025 0749 81 70 - 130 mg/dL Final   02/11/2025 2021 186 (H) 70 - 130 mg/dL Final   02/11/2025 1618 199 (H) 70 - 130 mg/dL Final   02/11/2025 1210 317 (H) 70 - 130 mg/dL Final   02/11/2025 0757 96 70 - 130 mg/dL Final   02/10/2025 2103 158 (H) 70 - 130 mg/dL Final       XR Forearm 2 View Right    Result Date: 2/11/2025   1. No acute appearing fracture. Suspect an old distal radius fracture. 2. Decreased acromiohumeral interval suggestive of underlying rotator cuff tendinopathy or tear. 3. Degenerative AC joint. 4. Moderate first carpal metacarpal joint osteoarthritis  This report was finalized on 2/11/2025 5:05 PM by Dr. Mark Araya M.D on Workstation: [id number]      XR Shoulder 2+ View Right    Result Date: 2/11/2025   1. No acute appearing fracture. Suspect an old distal radius fracture. 2. Decreased acromiohumeral interval suggestive of underlying rotator cuff tendinopathy or tear. 3. Degenerative AC joint. 4. Moderate first carpal metacarpal joint osteoarthritis  This report was finalized on 2/11/2025 5:05 PM by Dr. Mark Araya M.D on Workstation: [id number]       I have personally reviewed all medications:  Scheduled Medications  [Held by provider] amLODIPine, 10 mg, Oral, Q24H   And  [Held by provider] lisinopril, 40 mg, Oral, Q24H  [Transfer Hold] aspirin, 81 mg, Oral, Daily  carvedilol, 12.5 mg, Oral, BID With Meals  cefTRIAXone, 2,000 mg, Intravenous, Q24H  [Transfer Hold] empagliflozin, 25 mg, Oral, Daily  [Transfer Hold] enoxaparin, 40 mg, Subcutaneous, Q24H  [Transfer Hold]  glipizide, 5 mg, Oral, QAM AC  [Transfer Hold] insulin glargine, 10 Units, Subcutaneous, Daily  [Transfer Hold] insulin lispro, 2-9 Units, Subcutaneous, 4x Daily AC & at Bedtime  [Transfer Hold] ipratropium-albuterol, 3 mL, Nebulization, Q6H While Awake - RT  [Transfer Hold] rosuvastatin, 10 mg, Oral, Nightly  [Transfer Hold] sodium bicarbonate, 650 mg, Oral, Nightly  [Transfer Hold] sodium chloride, 10 mL, Intravenous, Q12H  sodium chloride, 3 mL, Intravenous, Q12H  [Transfer Hold] sodium zirconium cyclosilicate, 5 g, Oral, Every Other Day    Infusions  lactated ringers, 9 mL/hr, Last Rate: 9 mL/hr (02/12/25 1206)    Diet  NPO Diet NPO Type: Strict NPO, Sips with Meds    I have personally reviewed:  [x]  Laboratory   [x]  Microbiology   [x]  Radiology   [x]  EKG/Telemetry  [x]  Cardiology/Vascular   []  Pathology    []  Records       Assessment/Plan     Active Hospital Problems    Diagnosis  POA    **Bacterial pneumonia [J15.9]  Yes    Sepsis, unspecified organism [A41.9]  Yes    Influenza A [J10.1]  Yes    Acute pain of left knee [M25.562]  Yes    Type 2 diabetes mellitus with hyperglycemia, with long-term current use of insulin [E11.65, Z79.4]  Not Applicable    CKD (chronic kidney disease) stage 3, GFR 30-59 ml/min [N18.30]  Yes    Acute respiratory failure with hypoxia [J96.01]  Yes      Resolved Hospital Problems   No resolved problems to display.       74 y.o. female admitted with Bacterial pneumonia.    Assessment and plan  1.  Acute respiratory failure with hypoxia, bacterial pneumonia, recent history of flu infection, superimposed bacterial pneumonia, treated with antibiotics, continue Rocephin, add doxycycline for atypical coverage.     2.  Acute pain in the left knee, status post orthopedics, suspected septic arthritis, plans for or for debridement and washout.     3.  Acute on chronic kidney injury, renal function has improved, appears to be close to baseline.  Avoid nephrotoxic medications.     4.   Diabetes mellitus, continue Accu-Cheks and sliding scale insulin coverage.     5.  Acidosis, continue to monitor, labs.     6.  CODE STATUS is full code.  Further plans based on hospital course.      Raleigh Boateng MD  [facility]  02/12/25  12:22 EST

## 2025-02-12 NOTE — ANESTHESIA PROCEDURE NOTES
Airway  Urgency: elective    Date/Time: 2/12/2025 1:12 PM  End Time:2/12/2025 1:12 PM  Airway not difficult    General Information and Staff    Patient location during procedure: OR  CRNA/CAA: Cj Josue CRNA    Indications and Patient Condition  Indications for airway management: airway protection    Preoxygenated: yes  MILS maintained throughout  Mask difficulty assessment: 1 - vent by mask    Final Airway Details  Final airway type: supraglottic airway      Successful airway: unique  Size 4  Airway Seal Pressure (cm H2O): 18     Number of attempts at approach: 1  Assessment: lips, teeth, and gum same as pre-op and atraumatic intubation

## 2025-02-12 NOTE — SIGNIFICANT NOTE
02/12/25 1214   OTHER   Discipline physical therapy assistant   Rehab Time/Intention   Session Not Performed patient unavailable for treatment  (pt off floor for knee I&D; PT will f/u tomorrow)   Recommendation   PT - Next Appointment 02/13/25

## 2025-02-12 NOTE — ANESTHESIA POSTPROCEDURE EVALUATION
"Patient: Chrissy Gutierrez    Procedure Summary       Date: 02/12/25 Room / Location: [facility] OR [address] [place] [address] MAIN OR    Anesthesia Start: 1305 Anesthesia Stop: 1415    Procedure: KNEE INCISION AND DRAINAGE (Left: Knee) Diagnosis:     Surgeons: Arvind Ibarra MD Provider: Maurice Cedillo MD    Anesthesia Type: general ASA Status: 4            Anesthesia Type: general    Vitals  Vitals Value Taken Time   /60 02/12/25 1500   Temp 37.3 °C (99.1 °F) 02/12/25 1410   Pulse 95 02/12/25 1506   Resp 20 02/12/25 1500   SpO2 97 % 02/12/25 1506   Vitals shown include unfiled device data.        Post Anesthesia Care and Evaluation    Patient location during evaluation: bedside  Patient participation: complete - patient participated  Level of consciousness: awake and alert  Pain management: adequate    Airway patency: patent  Anesthetic complications: No anesthetic complications  PONV Status: controlled  Cardiovascular status: acceptable and hemodynamically stable  Respiratory status: acceptable, spontaneous ventilation and nonlabored ventilation  Hydration status: acceptable    Comments: /60   Pulse 96   Temp 37.3 °C (99.1 °F) (Oral)   Resp 20   Ht 170.2 cm (67\")   Wt 92.5 kg (204 lb)   SpO2 96%   BMI 31.95 kg/m²           "

## 2025-02-12 NOTE — ANESTHESIA PREPROCEDURE EVALUATION
Anesthesia Evaluation     Patient summary reviewed and Nursing notes reviewed   no history of anesthetic complications:   NPO Solid Status: > 8 hours  NPO Liquid Status: > 2 hours           Airway   Mallampati: II  TM distance: >3 FB  Neck ROM: full  Dental      Pulmonary    (+) pneumonia ,    ROS comment: Currently on 4L NC but patient feels like she has improved from a respiratory standpoint  Cardiovascular     ECG reviewed    (+) hypertension      Neuro/Psych  GI/Hepatic/Renal/Endo    (+) obesity, renal disease- CRI, diabetes mellitus    Musculoskeletal         ROS comment: Left knee septic arthritis   Abdominal    Substance History      OB/GYN          Other                      Anesthesia Plan    ASA 4     general     intravenous induction     Anesthetic plan, risks, benefits, and alternatives have been provided, discussed and informed consent has been obtained with: patient.      CODE STATUS:    Code Status (Patient has no pulse and is not breathing): CPR (Attempt to Resuscitate)  Medical Interventions (Patient has pulse or is breathing): Full Support

## 2025-02-13 ENCOUNTER — APPOINTMENT (OUTPATIENT)
Dept: CT IMAGING | Facility: HOSPITAL | Age: 75
DRG: 853 | End: 2025-02-13
Payer: MEDICARE

## 2025-02-13 LAB
ALBUMIN SERPL-MCNC: 2.7 G/DL (ref 3.5–5.2)
ALBUMIN/GLOB SERPL: 0.6 G/DL
ALP SERPL-CCNC: 100 U/L (ref 39–117)
ALT SERPL W P-5'-P-CCNC: 466 U/L (ref 1–33)
ANION GAP SERPL CALCULATED.3IONS-SCNC: 17 MMOL/L (ref 5–15)
AST SERPL-CCNC: 870 U/L (ref 1–32)
BASOPHILS # BLD AUTO: 0.02 10*3/MM3 (ref 0–0.2)
BASOPHILS NFR BLD AUTO: 0.1 % (ref 0–1.5)
BILIRUB SERPL-MCNC: 0.2 MG/DL (ref 0–1.2)
BUN SERPL-MCNC: 37 MG/DL (ref 8–23)
BUN/CREAT SERPL: 26.4 (ref 7–25)
CALCIUM SPEC-SCNC: 9 MG/DL (ref 8.6–10.5)
CHLORIDE SERPL-SCNC: 104 MMOL/L (ref 98–107)
CO2 SERPL-SCNC: 18 MMOL/L (ref 22–29)
CREAT SERPL-MCNC: 1.4 MG/DL (ref 0.57–1)
DEPRECATED RDW RBC AUTO: 38.9 FL (ref 37–54)
EGFRCR SERPLBLD CKD-EPI 2021: 39.6 ML/MIN/1.73
EOSINOPHIL # BLD AUTO: 0 10*3/MM3 (ref 0–0.4)
EOSINOPHIL NFR BLD AUTO: 0 % (ref 0.3–6.2)
ERYTHROCYTE [DISTWIDTH] IN BLOOD BY AUTOMATED COUNT: 12.1 % (ref 12.3–15.4)
GLOBULIN UR ELPH-MCNC: 4.5 GM/DL
GLUCOSE BLDC GLUCOMTR-MCNC: 163 MG/DL (ref 70–130)
GLUCOSE BLDC GLUCOMTR-MCNC: 218 MG/DL (ref 70–130)
GLUCOSE BLDC GLUCOMTR-MCNC: 221 MG/DL (ref 70–130)
GLUCOSE BLDC GLUCOMTR-MCNC: 227 MG/DL (ref 70–130)
GLUCOSE SERPL-MCNC: 202 MG/DL (ref 65–99)
HCT VFR BLD AUTO: 29.4 % (ref 34–46.6)
HGB BLD-MCNC: 9.9 G/DL (ref 12–15.9)
IMM GRANULOCYTES # BLD AUTO: 0.15 10*3/MM3 (ref 0–0.05)
IMM GRANULOCYTES NFR BLD AUTO: 0.9 % (ref 0–0.5)
LYMPHOCYTES # BLD AUTO: 0.73 10*3/MM3 (ref 0.7–3.1)
LYMPHOCYTES NFR BLD AUTO: 4.3 % (ref 19.6–45.3)
MCH RBC QN AUTO: 29.8 PG (ref 26.6–33)
MCHC RBC AUTO-ENTMCNC: 33.7 G/DL (ref 31.5–35.7)
MCV RBC AUTO: 88.6 FL (ref 79–97)
MONOCYTES # BLD AUTO: 0.71 10*3/MM3 (ref 0.1–0.9)
MONOCYTES NFR BLD AUTO: 4.2 % (ref 5–12)
NEUTROPHILS NFR BLD AUTO: 15.44 10*3/MM3 (ref 1.7–7)
NEUTROPHILS NFR BLD AUTO: 90.5 % (ref 42.7–76)
NRBC BLD AUTO-RTO: 0 /100 WBC (ref 0–0.2)
PLATELET # BLD AUTO: 395 10*3/MM3 (ref 140–450)
PMV BLD AUTO: 8.9 FL (ref 6–12)
POTASSIUM SERPL-SCNC: 4.3 MMOL/L (ref 3.5–5.2)
PROT SERPL-MCNC: 7.2 G/DL (ref 6–8.5)
RBC # BLD AUTO: 3.32 10*6/MM3 (ref 3.77–5.28)
SODIUM SERPL-SCNC: 139 MMOL/L (ref 136–145)
WBC NRBC COR # BLD AUTO: 17.05 10*3/MM3 (ref 3.4–10.8)

## 2025-02-13 PROCEDURE — 80053 COMPREHEN METABOLIC PANEL: CPT | Performed by: ORTHOPAEDIC SURGERY

## 2025-02-13 PROCEDURE — 25010000002 ENOXAPARIN PER 10 MG: Performed by: ORTHOPAEDIC SURGERY

## 2025-02-13 PROCEDURE — 94664 DEMO&/EVAL PT USE INHALER: CPT

## 2025-02-13 PROCEDURE — 63710000001 INSULIN GLARGINE PER 5 UNITS: Performed by: ORTHOPAEDIC SURGERY

## 2025-02-13 PROCEDURE — 25010000002 CEFAZOLIN PER 500 MG: Performed by: ORTHOPAEDIC SURGERY

## 2025-02-13 PROCEDURE — 94799 UNLISTED PULMONARY SVC/PX: CPT

## 2025-02-13 PROCEDURE — 94760 N-INVAS EAR/PLS OXIMETRY 1: CPT

## 2025-02-13 PROCEDURE — 97110 THERAPEUTIC EXERCISES: CPT

## 2025-02-13 PROCEDURE — 25010000002 CEFTRIAXONE PER 250 MG: Performed by: ORTHOPAEDIC SURGERY

## 2025-02-13 PROCEDURE — 82948 REAGENT STRIP/BLOOD GLUCOSE: CPT

## 2025-02-13 PROCEDURE — 94761 N-INVAS EAR/PLS OXIMETRY MLT: CPT

## 2025-02-13 PROCEDURE — 63710000001 INSULIN LISPRO (HUMAN) PER 5 UNITS: Performed by: ORTHOPAEDIC SURGERY

## 2025-02-13 PROCEDURE — 85025 COMPLETE CBC W/AUTO DIFF WBC: CPT | Performed by: ORTHOPAEDIC SURGERY

## 2025-02-13 RX ORDER — FUROSEMIDE 20 MG/1
20 TABLET ORAL DAILY
Status: DISCONTINUED | OUTPATIENT
Start: 2025-02-14 | End: 2025-02-25 | Stop reason: HOSPADM

## 2025-02-13 RX ADMIN — SODIUM BICARBONATE 650 MG TABLET 650 MG: at 21:42

## 2025-02-13 RX ADMIN — SODIUM ZIRCONIUM CYCLOSILICATE 5 G: 5 POWDER, FOR SUSPENSION ORAL at 11:34

## 2025-02-13 RX ADMIN — EMPAGLIFLOZIN 25 MG: 25 TABLET, FILM COATED ORAL at 08:00

## 2025-02-13 RX ADMIN — ACETAMINOPHEN 650 MG: 325 TABLET, FILM COATED ORAL at 21:42

## 2025-02-13 RX ADMIN — ROSUVASTATIN CALCIUM 10 MG: 10 TABLET, FILM COATED ORAL at 21:42

## 2025-02-13 RX ADMIN — INSULIN LISPRO 2 UNITS: 100 INJECTION, SOLUTION INTRAVENOUS; SUBCUTANEOUS at 08:02

## 2025-02-13 RX ADMIN — INSULIN LISPRO 4 UNITS: 100 INJECTION, SOLUTION INTRAVENOUS; SUBCUTANEOUS at 17:37

## 2025-02-13 RX ADMIN — Medication 10 ML: at 08:02

## 2025-02-13 RX ADMIN — INSULIN GLARGINE 10 UNITS: 100 INJECTION, SOLUTION SUBCUTANEOUS at 08:02

## 2025-02-13 RX ADMIN — CARVEDILOL 12.5 MG: 12.5 TABLET, FILM COATED ORAL at 17:42

## 2025-02-13 RX ADMIN — ENOXAPARIN SODIUM 40 MG: 100 INJECTION SUBCUTANEOUS at 13:05

## 2025-02-13 RX ADMIN — IPRATROPIUM BROMIDE AND ALBUTEROL SULFATE 3 ML: 2.5; .5 SOLUTION RESPIRATORY (INHALATION) at 19:43

## 2025-02-13 RX ADMIN — CEFAZOLIN 2000 MG: 2 INJECTION, POWDER, FOR SOLUTION INTRAMUSCULAR; INTRAVENOUS at 08:03

## 2025-02-13 RX ADMIN — DOXYCYCLINE 100 MG: 100 CAPSULE ORAL at 07:59

## 2025-02-13 RX ADMIN — METHOCARBAMOL TABLETS 500 MG: 500 TABLET, COATED ORAL at 17:42

## 2025-02-13 RX ADMIN — IPRATROPIUM BROMIDE AND ALBUTEROL SULFATE 3 ML: 2.5; .5 SOLUTION RESPIRATORY (INHALATION) at 08:08

## 2025-02-13 RX ADMIN — CARVEDILOL 12.5 MG: 12.5 TABLET, FILM COATED ORAL at 07:59

## 2025-02-13 RX ADMIN — CEFTRIAXONE 2000 MG: 2 INJECTION, POWDER, FOR SOLUTION INTRAMUSCULAR; INTRAVENOUS at 04:45

## 2025-02-13 RX ADMIN — GLIPIZIDE 5 MG: 5 TABLET ORAL at 07:59

## 2025-02-13 RX ADMIN — ACETAMINOPHEN 650 MG: 325 TABLET, FILM COATED ORAL at 17:42

## 2025-02-13 RX ADMIN — ASPIRIN 81 MG: 81 TABLET, COATED ORAL at 08:00

## 2025-02-13 RX ADMIN — DOXYCYCLINE 100 MG: 100 CAPSULE ORAL at 22:07

## 2025-02-13 RX ADMIN — INSULIN LISPRO 4 UNITS: 100 INJECTION, SOLUTION INTRAVENOUS; SUBCUTANEOUS at 21:42

## 2025-02-13 RX ADMIN — INSULIN LISPRO 4 UNITS: 100 INJECTION, SOLUTION INTRAVENOUS; SUBCUTANEOUS at 11:34

## 2025-02-13 NOTE — BRIEF OP NOTE
KNEE INCISION AND DRAINAGE  Progress Note    Chrissy Gutierrez  2/12/2025    Pre-op Diagnosis:   Septic arthritis left knee       Post-Op Diagnosis Codes:   Septic arthritis left knee     Procedure/CPT® Codes:  ME I&D DEEP ABSC BURSA/HEMATOMA THIGH/KNEE REGION [97807]      Procedure(s):  KNEE INCISION AND DRAINAGE              Surgeon(s):  Arvind Ibarra MD    Anesthesia: General    Staff:   Circulator: Cesia Reinoso RN  Scrub Person: Miya Lezama         Estimated Blood Loss: 25 cc     Urine Voided: * No values recorded between 2/12/2025  1:03 PM and 2/12/2025  2:06 PM *    Specimens:                synovial fluid and tissue           Drains:   External Urinary Catheter (Active)   Daily Indications Strict bedrest 02/13/25 0000   Site Assessment Clean;Skin intact 02/12/25 1447   Application/Removal external catheter applied 02/12/25 1447   Collection Container Wall suction 02/13/25 0000   Wall suction (mmHG) 125 mmHG 02/13/25 0000   Securement Method Securing device 02/13/25 0000   Catheter care complete Yes 02/12/25 2000   Output (mL) 150 mL 02/12/25 1600       [REMOVED] External Urinary Catheter (Removed)   Daily Indications Daily output 02/13/25 0000   Site Assessment Clean;Skin intact 02/12/25 2000   Application/Removal external catheter changed 02/12/25 1012   Collection Container Wall suction 02/13/25 0000   Wall suction (mmHG) 100 mmHG 02/13/25 0000   Securement Method Securing device 02/13/25 0000   Catheter care complete Yes 02/12/25 1012   Output (mL) 700 mL 02/12/25 0351       Findings: purulent fluid         Complications: none evident           Arvind Ibarra MD     Date: 2/13/2025  Time: 08:51 EST

## 2025-02-13 NOTE — DISCHARGE INSTRUCTIONS
Knee washout Discharge Instructions  Arvind Ibarra MD    [phone]    INCISION CARE  Wash your hands prior to dressing changes  Dressing to be changed at post operative day 7. Use dry dressing and change every other day after this point. Okay to shower at post operative day 10 if the wound is clean and dry.  No creams or ointments to the incision until 6 weeks post op (assuming no drainage).  Do not touch or pick at the incision  Check incision every day and notify surgeon immediately if any of the following signs or symptoms are seen:  Increase in redness  Increase in swelling around the incision and of the entire extremity  Increase in pain  Increase in overall body temperature (greater than 100.4 degrees)  Your surgeon will instruct you regarding suture or staple removal. In general, this happens at your first postop visit. If you are discharged to an SNF/SNU facility, they can and should remove your staples at 14 days post operatively.    ACTIVITIES  Exercises:    Work on range of motion and strengthening about the knee.   Elevate the affected leg most of the day during the first week post operatively. Caution must be taken to avoid pillow placement directly under the heel of the leg, as this can cause pressure ulcers even with a soft pillow. All pillows and blankets should be placed underneath of the thigh and calf so that the heel is free-floating.  Use cold packs for 20-30 minutes approximately 5 times per day.    Activities of Daily Living:   Showering may begin at post operative day 10 if the wound is clean and dry.   No tub baths, hot tubs, or swimming pools for 4 weeks. If there is a fracture, you may have to wait longer than this for sufficient bony healing to be safe to go into a pool.     Restrictions  Weight: okay for full weight.  Driving: Many patients have questions about when it is safe to return to driving. The answer is that this is extremely variable. It depends on the extent of the  surgery, as well as how quickly you heal. Certainly left leg surgeries make returning to driving easier while right leg surgeries require more extensive rehabilitation before driving can be safe. Until you can press down on the brake hard, and are off of all narcotics, driving is not permitted. Your surgeon cannot “clear” you to return to driving, only you can make the decision when you feel it is safe.     Medications  Anticoagulants: After upper extremity surgery most patients do not require an anticoagulant unless you have another injury that will be keeping you from mobilizing. Lower extremity surgery typically does require use of an anticoagulation medicine.   IF YOU HAD LOWER EXTREMITY SURGERY AND ARE NOT DISCHARGED HOME WITH ANY ANTICOAGULANT MEDICINE YOU SHOULD TAKE ASPIRIN 81mg TWICE DAILY FOR 30 DAYS POSTOPERATIVELY.  If you are discharged home with an anticoagulant such as Aspirin, Xarelto, Eliquis, Coumadin, or Lovenox, follow these simple instructions:   Notify surgeon immediately if any sara bleeding is noted in the urine, stool, emesis, or from the nose or the incision. Blood in the stool will often appear as black rather than red. Blood in urine may appear as pink. Blood in emesis may appear as brown/black like coffee grounds.  You will need to apply pressure for longer periods of time to any cuts or abrasions to stop bleeding  Avoid alcohol while taking anticoagulants  Most anticoagulants are to be taken for 30 days postoperatively. After this time, you may stop using them unless instructed otherwise.   If you were already taking an anticoagulant (commonly Aspirin, Coumadin, or Plavix) you will likely be resuming your normal dose postoperatively and will be continuing that medication at the discretion of the prescribing physician.  Stool Softeners: You will be at greater risk of constipation after surgery due to being less mobile and the pain medications.  Take stool softeners as needed. Over the  counter Colace 100 mg 1-2 capsules twice daily can be taken.  If stools become too loose or too frequent, please decreases the dosage or stop the stool softener.  If constipation occurs despite use of stool softeners, you are to continue the stool softeners and add a laxative (Milk of Magnesia 1 ounce daily as needed)  Drink plenty of fluids, and eat fruits and vegetables during your recovery time. Getting up and mobilizing will help the bowels to recover their regular function, as will weaning off of all narcotics when the pain becomes tolerable.  Pain Medications utilized after surgery are narcotics. This is some general information about these medications.  CLASSIFICATION: Pain medications are called Opioids and are narcotics  LEGALITIES: It is illegal to share narcotics with others  DRIVING: it is illegal to drive while under the influence of narcotics. Doing so is a DUI.  POTENTIAL SIDE EFFECTS: nausea, vomiting, itching, dizziness, drowsiness, dry mouth, constipation, and difficulty urinating.  POTENTIAL ADVERSE EFFECTS:  Opioid tolerance can develop with use of pain medications and this simply means that it requires more and more of the medication to control pain. However, this is seen more in patients that use opioids for longer periods of time.  Opioid dependence can develop with use of Opioids. People with opioid dependence will experience withdrawal symptoms upon cessation of the medication.  Opioid addiction can develop with use of Opioids. The incidence of this is very unlikely in patients who take the medications as ordered and stop the medications as instructed.  Opioid overdose can be dangerous, but is unlikely when the medication is taken as ordered and stopped when ordered. It is important not to mix opioids with alcohol as this can lead to over sedation and respiratory difficulty.  DOSAGE:  After the initial surgical pain begins to resolve, you may begin to decrease the pain medication. By the end  of a few weeks, you should be off of pain medications.  Refills will not be given by the office during evening hours, on weekends, or after 6 weeks post-op. You are responsible for weaning off of pain medication. You can increase the time between narcotic pills, taking one every 4 then 6 then 8 hours and so on.  To seek refills on pain medications during the initial 6-week post-operative period, you must call the office to request the refill. The office will then notify you when to  the prescription. DO NOT wait until you are out of the medication to request a refill. Prescriptions will not be filled over the weekend and depending on the schedule, it may take a couple days for the prescription to be available. Someone will have to pick the prescription in person at the office.    FOLLOW-UP VISITS  You will need to follow up in the office with your surgeon in 2-3 weeks, or as instructed elsewhere in your discharge paperwork. Please call this number [phone] to schedule this appointment. If you are going to an SNF facility, they will arrange for you to follow up in the office.   If you have any concerns or suspected complications prior to your follow up visit, please call the office. Do not wait until your appointment time if you suspect complications. These will need to be addressed in the office promptly.      Arvind Ibarra MD    Orthopaedic Surgery  [facility]

## 2025-02-13 NOTE — PLAN OF CARE
Goal Outcome Evaluation:   No acute events. Pt requests home lasix be restarted r/t bilateral hand and arm swelling. One time dose ordered from [facility], with effectiveness. CTA from two days ago, pending. Nephrology to see today. A&Ox4. Continues with general malaise and cough. 4LPM nasal cannula. VSS. L knee dressing CDI. Safety maintained.

## 2025-02-13 NOTE — THERAPY TREATMENT NOTE
Patient Name: Chrissy Gutierrez  : 1950    MRN: [id number]                              Today's Date: 2025       Admit Date: 2/10/2025    Visit Dx:     ICD-10-CM ICD-9-CM   1. Acute respiratory failure with hypoxia  J96.01 518.81   2. Pneumonia of right lower lobe due to infectious organism  J18.9 486   3. Acute pain of both knees  M25.561 338.19    M25.562 719.46   4. Generalized weakness  R53.1 780.79   5. SONI (acute kidney injury)  N17.9 584.9   6. Acute UTI  N39.0 599.0   7. Septic arthritis of knee, left  M00.9 711.06     Patient Active Problem List   Diagnosis    Right lower lobe pneumonia    Sepsis, unspecified organism    Influenza A    Acute pain of left knee    Bacterial pneumonia    Type 2 diabetes mellitus with hyperglycemia, with long-term current use of insulin    CKD (chronic kidney disease) stage 3, GFR 30-59 ml/min    Acute respiratory failure with hypoxia     Past Medical History:   Diagnosis Date    Cancer     right kidney    Chronic kidney disease     Hypertension     Low back pain     Osteoarthritis     Peripheral neuropathy      Past Surgical History:   Procedure Laterality Date    BACK SURGERY      EPIDURAL BLOCK      KIDNEY SURGERY Right 2017    REMOVED RIGHT KIDNEY    KNEE INCISION AND DRAINAGE Left 2025    Procedure: KNEE INCISION AND DRAINAGE;  Surgeon: Arvind Ibarra MD;  Location: [facility];  Service: Orthopedics;  Laterality: Left;    LUMBAR DISCECTOMY FUSION INSTRUMENTATION N/A 2017    Procedure: 1 LEVEL LAMINECTOMY DECOMPRESSION L4 5 EXCISION FACET CYST;  Surgeon: Negrito Oconnell DO;  Location: [facility];  Service:     TUBAL ABDOMINAL LIGATION      WISDOM TOOTH EXTRACTION        General Information       Row Name 25 1326          Physical Therapy Time and Intention    Document Type therapy note (daily note)  -MS     Mode of Treatment physical therapy  -MS       Row Name 25 1326          General Information    Existing  Precautions/Restrictions fall  -MS       Row Name 02/13/25 1326          Cognition    Orientation Status (Cognition) oriented x 3  -MS       Row Name 02/13/25 1326          Safety Issues/Impairments Affecting Functional Mobility    Impairments Affecting Function (Mobility) balance;pain;shortness of breath;strength;endurance/activity tolerance  -MS     Comment, Safety Issues/Impairments (Mobility) Gait belt and non skid socks donned.  -MS               User Key  (r) = Recorded By, (t) = Taken By, (c) = Cosigned By      Initials Name Provider Type    Sofia Candelario, PT Physical Therapist                   Mobility       Row Name 02/13/25 1327          Bed Mobility    Bed Mobility supine-sit;scooting/bridging  -MS     Scooting/Bridging Carver (Bed Mobility) maximum assist (25% patient effort);2 person assist;verbal cues;nonverbal cues (demo/gesture)  -MS     Supine-Sit Carver (Bed Mobility) supervision;verbal cues  -MS     Sit-Supine Carver (Bed Mobility) moderate assist (50% patient effort);verbal cues;nonverbal cues (demo/gesture)  -MS     Assistive Device (Bed Mobility) bed rails;head of bed elevated  -MS       Row Name 02/13/25 1327          Transfers    Comment, (Transfers) Sequencing and hand placement cues. One STS- bed height incr for mechanical advantage.  -MS       Row Name 02/13/25 1327          Sit-Stand Transfer    Sit-Stand Carver (Transfers) moderate assist (50% patient effort);2 person assist;verbal cues;nonverbal cues (demo/gesture)  -MS     Assistive Device (Sit-Stand Transfers) walker, front-wheeled  -MS       Row Name 02/13/25 1327          Gait/Stairs (Locomotion)    Comment, (Gait/Stairs) Unable to take small steps towards HOB due to pain and weakness.  -MS               User Key  (r) = Recorded By, (t) = Taken By, (c) = Cosigned By      Initials Name Provider Type    Sofia Candelario, PT Physical Therapist                   Obj/Interventions       Row Name  02/13/25 1329          Balance    Balance Assessment sitting static balance;standing static balance  -MS     Static Sitting Balance standby assist  -MS     Position, Sitting Balance sitting edge of bed  -MS     Static Standing Balance contact guard  -MS     Position/Device Used, Standing Balance supported;walker, front-wheeled  -MS       Row Name 02/13/25 1329          Sensory Assessment (Somatosensory)    Sensory Assessment (Somatosensory) sensation intact  -MS               User Key  (r) = Recorded By, (t) = Taken By, (c) = Cosigned By      Initials Name Provider Type    MS UzmaSofia, PT Physical Therapist                   Goals/Plan    No documentation.                  Clinical Impression       Row Name 02/13/25 1329          Pain    Pretreatment Pain Rating 8/10  -MS     Posttreatment Pain Rating 8/10  -MS     Pain Side/Orientation bilateral  -MS     Pain Management Interventions exercise or physical activity utilized;positioning techniques utilized  -MS     Response to Pain Interventions activity participation with tolerable pain  -MS       Row Name 02/13/25 1329          Plan of Care Review    Plan of Care Reviewed With patient  -MS     Outcome Evaluation Patient pleasant and agreeable to PT this morning- POD1 R knee I&D. SV to achieve EOB, stood with modAx2 for one STS at EOB but was unable to take small side steps towards HOB due to fatigue and pain. Patient did demo progress compared to previous therapy session prior to I&D. Continue to recommend SNF for continued rehabilitation upon DC.  -MS       Row Name 02/13/25 1329          Vital Signs    O2 Delivery Pre Treatment room air  -MS       Row Name 02/13/25 1329          Positioning and Restraints    Pre-Treatment Position in bed  -MS     Post Treatment Position bed  -MS     In Bed notified nsg;fowlers;call light within reach;encouraged to call for assist;exit alarm on  -MS               User Key  (r) = Recorded By, (t) = Taken By, (c) = Cosigned  By      Initials Name Provider Type    MS GusmanSofia, PT Physical Therapist                   Outcome Measures       Row Name 02/13/25 1332          How much help from another person do you currently need...    Turning from your back to your side while in flat bed without using bedrails? 3  -MS     Moving from lying on back to sitting on the side of a flat bed without bedrails? 2  -MS     Moving to and from a bed to a chair (including a wheelchair)? 2  -MS     Standing up from a chair using your arms (e.g., wheelchair, bedside chair)? 2  -MS     Climbing 3-5 steps with a railing? 1  -MS     To walk in hospital room? 1  -MS     AM-PAC 6 Clicks Score (PT) 11  -MS               User Key  (r) = Recorded By, (t) = Taken By, (c) = Cosigned By      Initials Name Provider Type    MS GusmanSofia, PT Physical Therapist                                 Physical Therapy Education       Title: PT OT SLP Therapies (In Progress)       Topic: Physical Therapy (In Progress)       Point: Mobility training (In Progress)       Learning Progress Summary            Patient Acceptance, E, NR by  at 2/11/2025 1323                      Point: Home exercise program (In Progress)       Learning Progress Summary            Patient Acceptance, E, NR by  at 2/11/2025 1323                                      User Key       Initials Effective Dates Name Provider Type Discipline     09/06/24 -  Shen Gutierrez, PT Physical Therapist PT                  PT Recommendation and Plan     Outcome Evaluation: Patient pleasant and agreeable to PT this morning- POD1 R knee I&D. SV to achieve EOB, stood with modAx2 for one STS at EOB but was unable to take small side steps towards HOB due to fatigue and pain. Patient did demo progress compared to previous therapy session prior to I&D. Continue to recommend SNF for continued rehabilitation upon DC.     Time Calculation:         PT Charges       Row Name 02/13/25 1325             Time  Calculation    Start Time 1111  -MS      Stop Time 1129  -MS      Time Calculation (min) 18 min  -MS      PT Received On 02/13/25  -MS      PT - Next Appointment 02/14/25  -MS                User Key  (r) = Recorded By, (t) = Taken By, (c) = Cosigned By      Initials Name Provider Type    Sofia Candelario, PT Physical Therapist                  Therapy Charges for Today       Code Description Service Date Service Provider Modifiers Qty    84707935422 HC PT THER PROC EA 15 MIN 2/13/2025 Sofia Gusman, PT GP 1            PT G-Codes  AM-PAC 6 Clicks Score (PT): 11  PT Discharge Summary  Anticipated Discharge Disposition (PT): skilled nursing facility    Sofia Gusman PT  2/13/2025

## 2025-02-13 NOTE — CONSULTS
[facility]                                                                                             Nephrology Initial Consult Note    Patient Identification:  Name: Chrissy Gutierrez MRN: [id number]  Age: 74 y.o. : 1950  Sex: female  Date:2025    Requesting Physician: As per consult order.  Reason for Consultation: CKD  Information from:patient/ family/ chart      History of Present Illness: This is a 74 y.o. year old female  with history of chronic kidney disease followed in our office by Dr. Cesia Vieira, last seen in July of last year and at that time her creatinine was stable at 1.5.  Recent baseline creatinine according to our records has been closer to 1.7-1.8.  CKD attributed to hypertension and diabetes with prior nephrectomy for renal cell carcinoma    She was initially admitted here at [facility] on 2/10 with weakness and left knee pain, did initially meet sepsis protocol for bacterial pneumonia, septic left knee and was started on IV antibiotics.  Status post knee aspiration, also found to have a UTI.  Her creatinine was 1.58 on admission,  Down to 1.25 yesterday and 1.4 today.  Electrolytes are stable, CO2 is 18, increased LFTs noted.    The following medical history and medications personally reviewed by me:    Problem List:     Bacterial pneumonia    Sepsis, unspecified organism    Influenza A    Acute pain of left knee    Type 2 diabetes mellitus with hyperglycemia, with long-term current use of insulin    CKD (chronic kidney disease) stage 3, GFR 30-59 ml/min    Acute respiratory failure with hypoxia      Past Medical History:  Past Medical History:   Diagnosis Date    Cancer     right kidney    Chronic kidney disease     Hypertension     Low back pain     Osteoarthritis     Peripheral neuropathy        Past Surgical History:  Past Surgical History:   Procedure Laterality Date     BACK SURGERY  2021    EPIDURAL BLOCK      KIDNEY SURGERY Right 03/08/2017    REMOVED RIGHT KIDNEY    KNEE INCISION AND DRAINAGE Left 2/12/2025    Procedure: KNEE INCISION AND DRAINAGE;  Surgeon: Arvind Ibarra MD;  Location: [facility] MAIN OR;  Service: Orthopedics;  Laterality: Left;    LUMBAR DISCECTOMY FUSION INSTRUMENTATION N/A 06/09/2017    Procedure: 1 LEVEL LAMINECTOMY DECOMPRESSION L4 5 EXCISION FACET CYST;  Surgeon: Negrito Oconnell DO;  Location: [facility] MAIN OR;  Service:     TUBAL ABDOMINAL LIGATION      WISDOM TOOTH EXTRACTION          Home Meds:   Medications Prior to Admission   Medication Sig Dispense Refill Last Dose/Taking    acetaminophen (TYLENOL) 500 MG tablet Take 1 tablet by mouth Every 6 (Six) Hours As Needed for Mild Pain.   2/9/2025 Bedtime    amLODIPine-benazepril (LOTREL) 10-40 MG per capsule Take 1 capsule by mouth.   2/9/2025 Morning    aspirin 81 MG EC tablet Take 1 tablet by mouth Daily.   2/9/2025 Morning    carvedilol (COREG) 25 MG tablet Take 1 tablet by mouth.   2/9/2025 Evening    dapagliflozin Propanediol (Farxiga) 10 MG tablet Take 10 mg by mouth Daily.   2/9/2025 Morning    furosemide (LASIX) 20 MG tablet Take 1 tablet by mouth Daily.   2/9/2025 Morning    Garlic 400 MG tablet delayed-release Take 1 tablet by mouth. On hold for sx.   2/9/2025 Morning    glimepiride (AMARYL) 2 MG tablet Take 1 tablet by mouth.   2/9/2025 Morning    hydrochlorothiazide (HYDRODIURIL) 25 MG tablet Take 1 tablet by mouth.   2/9/2025 Morning    insulin glargine (LANTUS, SEMGLEE) 100 UNIT/ML injection Inject 10 Units under the skin into the appropriate area as directed Daily.   2/9/2025 Morning    methocarbamol (ROBAXIN) 500 MG tablet Take 1 tablet by mouth 4 (Four) Times a Day As Needed for Muscle Spasms. 15 tablet 0 2/9/2025 Morning    Misc Natural Products (OSTEO BI-FLEX ADV DOUBLE ST PO) Take 2 tablets by mouth Daily.   2/9/2025 Evening    NIACIN PO Take 1 tablet by mouth Daily.   2/9/2025 Morning     Omega-3 Fatty Acids (FISH OIL CONCENTRATE PO) Take 2,000 mg by mouth Daily.   2/9/2025 Morning    rosuvastatin (CRESTOR) 10 MG tablet Take 1 tablet by mouth Daily.   2/9/2025 Evening    sodium bicarbonate 650 MG tablet Take 1 tablet by mouth Daily.   2/9/2025 Evening    sodium zirconium cyclosilicate (Lokelma) 5 g packet Take 5 g by mouth Every Other Day. Empty entire contents of the packet(s) into a glass with at least 3 tablespoons (45 mL) of water. Stir well and drink immediately; if powder remains in the glass, add water, stir and drink immediately; repeat until no powder remains. Administer other oral medications at least 2 hours before or 2 hours after dose.   2/9/2025 Morning    HYDROcodone-acetaminophen (NORCO) 5-325 MG per tablet Take 1 tablet by mouth Every 6 (Six) Hours As Needed (Pain). 40 tablet 0     metFORMIN (GLUCOPHAGE) 500 MG tablet Take 1 tablet by mouth 2 (Two) Times a Day With Meals.       Multiple Vitamins-Minerals (MULTIVITAMIN ADULT PO) Take 1 tablet by mouth Daily.       simvastatin (ZOCOR) 20 MG tablet Take 1 tablet by mouth.          Current Meds:   Current Facility-Administered Medications   Medication Dose Route Frequency Provider Last Rate Last Admin    acetaminophen (TYLENOL) tablet 650 mg  650 mg Oral Q4H PRN Arvind Ibarra MD   650 mg at 02/12/25 2045    Or    acetaminophen (TYLENOL) 160 MG/5ML oral solution 650 mg  650 mg Oral Q4H PRN Arvind Ibarra MD        Or    acetaminophen (TYLENOL) suppository 650 mg  650 mg Rectal Q4H PRN Arvind Ibarra MD        aspirin EC tablet 81 mg  81 mg Oral Daily Arvind Ibarra MD   81 mg at 02/13/25 0800    sennosides-docusate (PERICOLACE) 8.6-50 MG per tablet 2 tablet  2 tablet Oral BID PRN Arvind Ibarra MD        And    polyethylene glycol (MIRALAX) packet 17 g  17 g Oral Daily PRN Arvind Ibarra MD        And    bisacodyl (DULCOLAX) EC tablet 5 mg  5 mg Oral Daily PRN Arvind Ibarra MD        And    bisacodyl  (DULCOLAX) suppository 10 mg  10 mg Rectal Daily PRN Arvind Ibarra MD        calcium carbonate (TUMS) chewable tablet 500 mg (200 mg elemental)  2 tablet Oral BID PRN Arvind Ibarra MD        carvedilol (COREG) tablet 12.5 mg  12.5 mg Oral BID With Meals Arvind Ibarra MD   12.5 mg at 02/13/25 0759    cefTRIAXone (ROCEPHIN) 2,000 mg in sodium chloride 0.9 % 100 mL MBP  2,000 mg Intravenous Q24H Arvind Ibarra  mL/hr at 02/13/25 0445 2,000 mg at 02/13/25 0445    dextrose (D50W) (25 g/50 mL) IV injection 25 g  25 g Intravenous Q15 Min PRN Arvind Ibarra MD        dextrose (GLUTOSE) oral gel 15 g  15 g Oral Q15 Min PRN Arvind Ibarra MD        doxycycline (MONODOX) capsule 100 mg  100 mg Oral Q12H Arvind Ibarra MD   100 mg at 02/13/25 0759    empagliflozin (JARDIANCE) tablet 25 mg  25 mg Oral Daily Arvind Ibarra MD   25 mg at 02/13/25 0800    Enoxaparin Sodium (LOVENOX) syringe 40 mg  40 mg Subcutaneous Q24H Arvind Ibarra MD   40 mg at 02/13/25 1305    glipizide (GLUCOTROL) tablet 5 mg  5 mg Oral QAM AC Arvind Ibarra MD   5 mg at 02/13/25 0759    glucagon (GLUCAGEN) injection 1 mg  1 mg Intramuscular Q15 Min PRN Arvind Ibarra MD        insulin glargine (LANTUS, SEMGLEE) injection 10 Units  10 Units Subcutaneous Daily Arvind Ibarra MD   10 Units at 02/13/25 0802    insulin lispro (HUMALOG/ADMELOG) injection 2-9 Units  2-9 Units Subcutaneous 4x Daily AC & at Bedtime Arvind Ibarra MD   4 Units at 02/13/25 1134    ipratropium-albuterol (DUO-NEB) nebulizer solution 3 mL  3 mL Nebulization Q4H PRN Arvind Ibarra MD        ipratropium-albuterol (DUO-NEB) nebulizer solution 3 mL  3 mL Nebulization Q6H While Awake - RT Arvind Ibarra MD   3 mL at 02/13/25 0808    methocarbamol (ROBAXIN) tablet 500 mg  500 mg Oral 4x Daily PRN Arvind Ibarra MD   500 mg at 02/12/25 2045    nitroglycerin (NITROSTAT) SL tablet 0.4 mg  0.4 mg Sublingual Q5 Min PRN  Arvind Ibarra MD        ondansetron ODT (ZOFRAN-ODT) disintegrating tablet 4 mg  4 mg Oral Q6H PRN Arvind Ibarra MD        Or    ondansetron (ZOFRAN) injection 4 mg  4 mg Intravenous Q6H PRN Arvind Ibarra MD        rosuvastatin (CRESTOR) tablet 10 mg  10 mg Oral Nightly Arvind Ibarra MD   10 mg at 02/12/25 2046    sodium bicarbonate tablet 650 mg  650 mg Oral Nightly Arvind Ibarra MD   650 mg at 02/12/25 2045    sodium chloride 0.9 % flush 10 mL  10 mL Intravenous Q12H Arvind Ibarra MD   10 mL at 02/13/25 0802    sodium chloride 0.9 % flush 10 mL  10 mL Intravenous PRN Arvind Ibarra MD        sodium chloride 0.9 % infusion 40 mL  40 mL Intravenous PRN Arvind Ibarra MD        sodium zirconium cyclosilicate (LOKELMA) packet 5 g  5 g Oral Every Other Day Arvind Ibarra MD   5 g at 02/13/25 1134       Allergies:  Allergies   Allergen Reactions    Ibuprofen Itching and Other (See Comments)     tachycardia       Social History:   Social History     Socioeconomic History    Marital status:    Tobacco Use    Smoking status: Never    Smokeless tobacco: Never   Vaping Use    Vaping status: Never Used   Substance and Sexual Activity    Alcohol use: Not Currently     Comment: socially    Drug use: No     Types: Hydrocodone    Sexual activity: Defer        Family History:  History reviewed. No pertinent family history.     Review of Systems: as per HPI, in addition:    General:      Denies weakness / fatigue,                       No fevers / chills                       no weight loss  HEENT;       no dysphagia   Neck:           No swelling  Respiratory: no cough / congestion/wheezing                      No shortness of air   CV:              No chest pain                       No palpitations  Abdomen/GI: no nausea / vomiting                      No diarrhea, no constipation                      No abdominal pain  :             no dysuria  Endocrine:   No heat or cold  "intolerance  Skin:           No rashes or skin lesions   Vascular:   No edema  Musculoskeletal: No joint pain or deformities      Physical Exam:  Vitals:   Temp (24hrs), Av.3 °F (36.3 °C), Min:96.6 °F (35.9 °C), Max:97.7 °F (36.5 °C)    /65 (BP Location: Right arm, Patient Position: Lying)   Pulse 82   Temp 96.6 °F (35.9 °C) (Oral)   Resp 18   Ht 170.2 cm (67\")   Wt 92.5 kg (204 lb)   SpO2 94%   BMI 31.95 kg/m²   Intake/Output:     Intake/Output Summary (Last 24 hours) at 2025 1412  Last data filed at 2025 1020  Gross per 24 hour   Intake 1040 ml   Output 2750 ml   Net -1710 ml        Wt Readings from Last 1 Encounters:   02/10/25 0330 92.5 kg (204 lb)       Exam:    General Appearance:  Awake, alert, no acute distress  Obese, chronically ill-appearing   Head and Face:  Normocephalic, atraumatic   Eyes:  No icterus   ENMT: Moist mucosa   Neck: Supple  no jugular venous distention   Pulmonary:  Respiratory effort: Normal  Auscultation of lungs: Clear bilaterally  No wheezes or rhonchi  Good air movement, good expansion   Chest wall:  No tenderness or deformity   Cardiovascular:  Auscultation of the heart: Normal rhythm, no murmurs  Trace edema of bilateral lower extremities   Abdomen:  Abdomen: soft, nontender, normal bowel sounds    Musculoskeletal: No joint swelling or gross deformities    Skin: Skin inspection and palpation: No rash   Lymphatic: No focal lymphadenopathy   Psychiatric: Judgement and insight: normal  Orientation to person place and time: normal  Mood and affect: normal       DATA:  Radiology and Labs:  The following labs independently reviewed by me, additional AM labs ordered  Old records independently reviewed showing CKD 3, office notes reviewed, baseline creatinine around 1.5.  CKD from diabetes and hypertension  The following radiologic studies independently viewed by me, findings shoulder film showed no fracture, chest x-ray atelectasis or pneumonia right lung " base  Interval notes, chart personally reviewed by me.  I have reviewed and summarized old records as detailed above  Plan of care discussed with patient herself at bedside  New problems include possible pneumonia    High complexity, CKD possible need for IV contrast  Risk/ complexity of medical care/ medical decision making: See prior line  Chronic illness with severe exacerbation or progression: CKD      Labs:   Recent Results (from the past 24 hours)   POC Glucose Once    Collection Time: 02/12/25  3:08 PM    Specimen: Blood   Result Value Ref Range    Glucose 87 70 - 130 mg/dL   POC Glucose Once    Collection Time: 02/12/25  4:59 PM    Specimen: Blood   Result Value Ref Range    Glucose 106 70 - 130 mg/dL   POC Glucose Once    Collection Time: 02/12/25  8:09 PM    Specimen: Blood   Result Value Ref Range    Glucose 250 (H) 70 - 130 mg/dL   Comprehensive Metabolic Panel    Collection Time: 02/13/25  4:16 AM    Specimen: Blood   Result Value Ref Range    Glucose 202 (H) 65 - 99 mg/dL    BUN 37 (H) 8 - 23 mg/dL    Creatinine 1.40 (H) 0.57 - 1.00 mg/dL    Sodium 139 136 - 145 mmol/L    Potassium 4.3 3.5 - 5.2 mmol/L    Chloride 104 98 - 107 mmol/L    CO2 18.0 (L) 22.0 - 29.0 mmol/L    Calcium 9.0 8.6 - 10.5 mg/dL    Total Protein 7.2 6.0 - 8.5 g/dL    Albumin 2.7 (L) 3.5 - 5.2 g/dL    ALT (SGPT) 466 (H) 1 - 33 U/L    AST (SGOT) 870 (H) 1 - 32 U/L    Alkaline Phosphatase 100 39 - 117 U/L    Total Bilirubin 0.2 0.0 - 1.2 mg/dL    Globulin 4.5 gm/dL    A/G Ratio 0.6 g/dL    BUN/Creatinine Ratio 26.4 (H) 7.0 - 25.0    Anion Gap 17.0 (H) 5.0 - 15.0 mmol/L    eGFR 39.6 (L) >60.0 mL/min/1.73   CBC Auto Differential    Collection Time: 02/13/25  4:16 AM    Specimen: Blood   Result Value Ref Range    WBC 17.05 (H) 3.40 - 10.80 10*3/mm3    RBC 3.32 (L) 3.77 - 5.28 10*6/mm3    Hemoglobin 9.9 (L) 12.0 - 15.9 g/dL    Hematocrit 29.4 (L) 34.0 - 46.6 %    MCV 88.6 79.0 - 97.0 fL    MCH 29.8 26.6 - 33.0 pg    MCHC 33.7 31.5 - 35.7  g/dL    RDW 12.1 (L) 12.3 - 15.4 %    RDW-SD 38.9 37.0 - 54.0 fl    MPV 8.9 6.0 - 12.0 fL    Platelets 395 140 - 450 10*3/mm3    Neutrophil % 90.5 (H) 42.7 - 76.0 %    Lymphocyte % 4.3 (L) 19.6 - 45.3 %    Monocyte % 4.2 (L) 5.0 - 12.0 %    Eosinophil % 0.0 (L) 0.3 - 6.2 %    Basophil % 0.1 0.0 - 1.5 %    Immature Grans % 0.9 (H) 0.0 - 0.5 %    Neutrophils, Absolute 15.44 (H) 1.70 - 7.00 10*3/mm3    Lymphocytes, Absolute 0.73 0.70 - 3.10 10*3/mm3    Monocytes, Absolute 0.71 0.10 - 0.90 10*3/mm3    Eosinophils, Absolute 0.00 0.00 - 0.40 10*3/mm3    Basophils, Absolute 0.02 0.00 - 0.20 10*3/mm3    Immature Grans, Absolute 0.15 (H) 0.00 - 0.05 10*3/mm3    nRBC 0.0 0.0 - 0.2 /100 WBC   POC Glucose Once    Collection Time: 02/13/25  7:22 AM    Specimen: Blood   Result Value Ref Range    Glucose 163 (H) 70 - 130 mg/dL   POC Glucose Once    Collection Time: 02/13/25 11:22 AM    Specimen: Blood   Result Value Ref Range    Glucose 227 (H) 70 - 130 mg/dL       Radiology:  Imaging Results (Last 24 Hours)       ** No results found for the last 24 hours. **                 ASSESSMENT:   CKD stage IIIa, stable, near baseline    Bacterial pneumonia    Sepsis, unspecified organism    Influenza A    Acute pain of left knee    Type 2 diabetes mellitus with hyperglycemia, with long-term current use of insulin    CKD (chronic kidney disease) stage 3, GFR 30-59 ml/min    Acute respiratory failure with hypoxia        DISCUSSION/PLAN:   Patient's creatinine has been a little bit below her baseline, possibly secondary to being off her usual doses of Lasix and lisinopril  Creatinine today is 1.4 and still below her usual baseline  Okay to proceed with CT, IV contrast of the chest today if needed but I have since been told by her nurse that this test has been canceled  We will plan to restart oral Lasix today at home dose  Continue to monitor electrolytes and volume closely, avoid IV contrast and nephrotoxic medications     I appreciate the  consult request.  Please send me a secure chat message with any nonurgent questions regarding patient care.  For any urgent patient care issues please call my office number below.      Antwan Vieira MD  [facility]  Office phone number: [phone]  Answering service phone number: [phone]      2/13/2025        Dictation via Dragon dictation software

## 2025-02-13 NOTE — PROGRESS NOTES
Name: Chrissy Gutierrez ADMIT: 2/10/2025   : 1950  PCP: Jai Steven MD    MRN: [id number] LOS: 3 days   AGE/SEX: 74 y.o. female  ROOM: [facility]     Subjective   Subjective   Patient seen at bedside, patient is lying in bed, feels better today.       Objective   Objective   Vital Signs  Temp:  [96.6 °F (35.9 °C)-97.7 °F (36.5 °C)] 96.6 °F (35.9 °C)  Heart Rate:  [82-89] 82  Resp:  [18-24] 18  BP: (108-119)/(59-71) 112/61  SpO2:  [91 %-95 %] 94 %  on  Flow (L/min) (Oxygen Therapy):  [2-4] 2;   Device (Oxygen Therapy): nasal cannula  Body mass index is 31.95 kg/m².  Physical Exam  General, awake and alert.  Head and ENT, normocephalic and atraumatic.  Lungs, symmetric expansion, equal air entry bilaterally.  Heart, regular rate and rhythm.  Abdomen, soft and nontender.  Extremities, no clubbing or cyanosis.  Neuro, no focal deficits.  Skin: Warm and no rash.  Psych, normal mood and affect.  Musculoskeletal, joint examination is grossly normal.     Copied text material from yesterday's note has been reviewed for appropriate changes and remains accurate as of 25.      Results Review     I reviewed the patient's new clinical results.  Results from last 7 days   Lab Units 2554 02/10/25  0748   WBC 10*3/mm3 17.05* 18.00* 17.81* 16.07*   HEMOGLOBIN g/dL 9.9* 9.4* 10.0* 10.3*   PLATELETS 10*3/mm3 395 387 408 411     Results from last 7 days   Lab Units 25  0654 02/10/25  0748   SODIUM mmol/L 139 141 141 137   POTASSIUM mmol/L 4.3 4.2 4.5 4.5   CHLORIDE mmol/L 104 108* 109* 105   CO2 mmol/L 18.0* 18.0* 16.4* 15.2*   BUN mg/dL 37* 31* 36* 53*   CREATININE mg/dL 1.40* 1.25* 1.21* 1.58*   GLUCOSE mg/dL 202* 80 98 209*   EGFR mL/min/1.73 39.6* 45.3* 47.1* 34.2*     Results from last 7 days   Lab Units 25  0416 25  0618 02/10/25  0357   ALBUMIN g/dL 2.7* 2.8* 3.2*   BILIRUBIN mg/dL 0.2 0.3 0.3   ALK PHOS U/L 100 80 84    AST (SGOT) U/L 870* 209* 77*   ALT (SGPT) U/L 466* 169* 108*     Results from last 7 days   Lab Units 02/13/25  0416 02/12/25  0618 02/11/25  0654 02/10/25  0748 02/10/25  0357   CALCIUM mg/dL 9.0 8.8 8.6 8.6 8.8   ALBUMIN g/dL 2.7* 2.8*  --   --  3.2*     Results from last 7 days   Lab Units 02/10/25  0357   PROCALCITONIN ng/mL 0.95*   LACTATE mmol/L 1.2     Glucose   Date/Time Value Ref Range Status   02/13/2025 1721 218 (H) 70 - 130 mg/dL Final   02/13/2025 1122 227 (H) 70 - 130 mg/dL Final   02/13/2025 0722 163 (H) 70 - 130 mg/dL Final   02/12/2025 2009 250 (H) 70 - 130 mg/dL Final   02/12/2025 1659 106 70 - 130 mg/dL Final   02/12/2025 1508 87 70 - 130 mg/dL Final   02/12/2025 1106 94 70 - 130 mg/dL Final       No radiology results for the last day    I have personally reviewed all medications:  Scheduled Medications  aspirin, 81 mg, Oral, Daily  carvedilol, 12.5 mg, Oral, BID With Meals  cefTRIAXone, 2,000 mg, Intravenous, Q24H  doxycycline, 100 mg, Oral, Q12H  empagliflozin, 25 mg, Oral, Daily  enoxaparin, 40 mg, Subcutaneous, Q24H  [START ON 2/14/2025] furosemide, 20 mg, Oral, Daily  glipizide, 5 mg, Oral, QAM AC  insulin glargine, 10 Units, Subcutaneous, Daily  insulin lispro, 2-9 Units, Subcutaneous, 4x Daily AC & at Bedtime  ipratropium-albuterol, 3 mL, Nebulization, Q6H While Awake - RT  rosuvastatin, 10 mg, Oral, Nightly  sodium bicarbonate, 650 mg, Oral, Nightly  sodium chloride, 10 mL, Intravenous, Q12H  sodium zirconium cyclosilicate, 5 g, Oral, Every Other Day    Infusions   Diet  Diet: Diabetic; Consistent Carbohydrate; Fluid Consistency: Thin (IDDSI 0)    I have personally reviewed:  [x]  Laboratory   [x]  Microbiology   [x]  Radiology   [x]  EKG/Telemetry  [x]  Cardiology/Vascular   []  Pathology    []  Records       Assessment/Plan     Active Hospital Problems    Diagnosis  POA    **Bacterial pneumonia [J15.9]  Yes    Sepsis, unspecified organism [A41.9]  Yes    Influenza A [J10.1]  Yes     Acute pain of left knee [M25.562]  Yes    Type 2 diabetes mellitus with hyperglycemia, with long-term current use of insulin [E11.65, Z79.4]  Not Applicable    CKD (chronic kidney disease) stage 3, GFR 30-59 ml/min [N18.30]  Yes    Acute respiratory failure with hypoxia [J96.01]  Yes      Resolved Hospital Problems   No resolved problems to display.       74 y.o. female admitted with Bacterial pneumonia.    Assessment and plan  1.  Acute respiratory failure with hypoxia, bacterial pneumonia, recent history of flu infection, superimposed bacterial pneumonia, treated with antibiotics, continue Rocephin, added doxycycline for atypical coverage.     2.  Acute pain in the left knee, status post orthopedics, suspected septic arthritis, S/P OR for debridement and washout.  Case discussed with infectious disease, based on patient's clinical presentation, it would be reasonable to complete 4 weeks of IV antibiotics presuming it is septic arthritis.     3.  Acute on chronic kidney injury, renal function has improved, appears to be close to baseline.  Avoid nephrotoxic medications.  Nephrology on board and following.     4.  Diabetes mellitus, continue Accu-Cheks and sliding scale insulin coverage.     5.  Acidosis, continue to monitor, labs.     6.  CODE STATUS is full code.  Further plans based on hospital course.     Expected Discharge Date: 2/17/2025; Expected Discharge Time:       Raleigh Boateng MD  [facility]  02/13/25  17:46 EST

## 2025-02-13 NOTE — PROGRESS NOTES
Orthopaedic Progress Note     Pain well controlled. Dressing clean and dry ROM improving 0-45 at the left knee. Able to range the right knee with no pain.     NAD  Alert  Respirations are nonlabored   LLE  Dressing clean and dry  Able to DF and PF at the ankle weakly   Knee ROM 0 -45   Sensation is intact distally   Foot is wwp   Compartments are soft and compressible     Ranging all other joints as tolerated with no pain right knee stiff with ROM 0-90     74 year old female with suspected left knee septic arthritis in the setting of bacterial pneumonia and UTI   POD 1 left knee irrigation and debridement   -discussed with her again that her cultures have remained negative and ID recommends 4 weeks course of IV Rocephin.   -WBAT BLE   -encourage ROM and mobilization  -to chair if able  -pain control   -ice and elevation   -PT   -dvt ppx 81 mg ASA BID until fully mobilizing    -please call/chat  with any questions or concerns.    Arvind Ibarra MD   [facility]   [phone] - [facility]  [phone] - [facility]

## 2025-02-13 NOTE — PLAN OF CARE
Goal Outcome Evaluation:  Plan of Care Reviewed With: patient           Outcome Evaluation: Patient pleasant and agreeable to PT this morning- POD1 R knee I&D. SV to achieve EOB, stood with modAx2 for one STS at EOB but was unable to take small side steps towards HOB due to fatigue and pain. Patient did demo progress compared to previous therapy session prior to I&D. Continue to recommend SNF for continued rehabilitation upon DC.    Anticipated Discharge Disposition (PT): skilled nursing facility

## 2025-02-13 NOTE — PLAN OF CARE
Goal Outcome Evaluation:      73 yo female admitted 2/10 with bacterial pneumonia. Iv abx. Weaned from 4L to 2L O2. No complaints of SOA. Continues to have congested cough. Lasix restarted per nephrology tomorrow. VS stable, A&Ox4.

## 2025-02-14 ENCOUNTER — APPOINTMENT (OUTPATIENT)
Dept: ULTRASOUND IMAGING | Facility: HOSPITAL | Age: 75
DRG: 853 | End: 2025-02-14
Payer: MEDICARE

## 2025-02-14 LAB
ALBUMIN SERPL-MCNC: 2.6 G/DL (ref 3.5–5.2)
ALBUMIN/GLOB SERPL: 0.6 G/DL
ALP SERPL-CCNC: 111 U/L (ref 39–117)
ALT SERPL W P-5'-P-CCNC: 331 U/L (ref 1–33)
ANION GAP SERPL CALCULATED.3IONS-SCNC: 14 MMOL/L (ref 5–15)
AST SERPL-CCNC: 535 U/L (ref 1–32)
BASOPHILS # BLD AUTO: 0.01 10*3/MM3 (ref 0–0.2)
BASOPHILS NFR BLD AUTO: 0.1 % (ref 0–1.5)
BILIRUB SERPL-MCNC: 0.2 MG/DL (ref 0–1.2)
BUN SERPL-MCNC: 49 MG/DL (ref 8–23)
BUN/CREAT SERPL: 34.3 (ref 7–25)
CALCIUM SPEC-SCNC: 8.8 MG/DL (ref 8.6–10.5)
CHLORIDE SERPL-SCNC: 104 MMOL/L (ref 98–107)
CK SERPL-CCNC: 380 U/L (ref 20–180)
CO2 SERPL-SCNC: 19 MMOL/L (ref 22–29)
CREAT SERPL-MCNC: 1.43 MG/DL (ref 0.57–1)
CYTO UR: NORMAL
DEPRECATED RDW RBC AUTO: 39.3 FL (ref 37–54)
EGFRCR SERPLBLD CKD-EPI 2021: 38.6 ML/MIN/1.73
EOSINOPHIL # BLD AUTO: 0 10*3/MM3 (ref 0–0.4)
EOSINOPHIL NFR BLD AUTO: 0 % (ref 0.3–6.2)
ERYTHROCYTE [DISTWIDTH] IN BLOOD BY AUTOMATED COUNT: 12.3 % (ref 12.3–15.4)
GLOBULIN UR ELPH-MCNC: 4.5 GM/DL
GLUCOSE BLDC GLUCOMTR-MCNC: 124 MG/DL (ref 70–130)
GLUCOSE BLDC GLUCOMTR-MCNC: 138 MG/DL (ref 70–130)
GLUCOSE BLDC GLUCOMTR-MCNC: 230 MG/DL (ref 70–130)
GLUCOSE BLDC GLUCOMTR-MCNC: 267 MG/DL (ref 70–130)
GLUCOSE SERPL-MCNC: 136 MG/DL (ref 65–99)
HCT VFR BLD AUTO: 28.8 % (ref 34–46.6)
HGB BLD-MCNC: 9.7 G/DL (ref 12–15.9)
IMM GRANULOCYTES # BLD AUTO: 0.12 10*3/MM3 (ref 0–0.05)
IMM GRANULOCYTES NFR BLD AUTO: 0.8 % (ref 0–0.5)
LAB AP CASE REPORT: NORMAL
LDH SERPL-CCNC: 733 U/L (ref 135–214)
LYMPHOCYTES # BLD AUTO: 1.05 10*3/MM3 (ref 0.7–3.1)
LYMPHOCYTES NFR BLD AUTO: 7 % (ref 19.6–45.3)
MCH RBC QN AUTO: 29.7 PG (ref 26.6–33)
MCHC RBC AUTO-ENTMCNC: 33.7 G/DL (ref 31.5–35.7)
MCV RBC AUTO: 88.1 FL (ref 79–97)
MONOCYTES # BLD AUTO: 0.6 10*3/MM3 (ref 0.1–0.9)
MONOCYTES NFR BLD AUTO: 4 % (ref 5–12)
NEUTROPHILS NFR BLD AUTO: 13.22 10*3/MM3 (ref 1.7–7)
NEUTROPHILS NFR BLD AUTO: 88.1 % (ref 42.7–76)
NRBC BLD AUTO-RTO: 0 /100 WBC (ref 0–0.2)
PATH REPORT.FINAL DX SPEC: NORMAL
PATH REPORT.GROSS SPEC: NORMAL
PLATELET # BLD AUTO: 430 10*3/MM3 (ref 140–450)
PMV BLD AUTO: 9.3 FL (ref 6–12)
POTASSIUM SERPL-SCNC: 4.3 MMOL/L (ref 3.5–5.2)
PROT SERPL-MCNC: 7.1 G/DL (ref 6–8.5)
RBC # BLD AUTO: 3.27 10*6/MM3 (ref 3.77–5.28)
SODIUM SERPL-SCNC: 137 MMOL/L (ref 136–145)
WBC NRBC COR # BLD AUTO: 15 10*3/MM3 (ref 3.4–10.8)

## 2025-02-14 PROCEDURE — 63710000001 INSULIN LISPRO (HUMAN) PER 5 UNITS: Performed by: ORTHOPAEDIC SURGERY

## 2025-02-14 PROCEDURE — 99222 1ST HOSP IP/OBS MODERATE 55: CPT | Performed by: NURSE PRACTITIONER

## 2025-02-14 PROCEDURE — 25010000002 CEFTRIAXONE PER 250 MG: Performed by: ORTHOPAEDIC SURGERY

## 2025-02-14 PROCEDURE — 82948 REAGENT STRIP/BLOOD GLUCOSE: CPT

## 2025-02-14 PROCEDURE — 76705 ECHO EXAM OF ABDOMEN: CPT

## 2025-02-14 PROCEDURE — 94664 DEMO&/EVAL PT USE INHALER: CPT

## 2025-02-14 PROCEDURE — 63710000001 INSULIN GLARGINE PER 5 UNITS: Performed by: ORTHOPAEDIC SURGERY

## 2025-02-14 PROCEDURE — 83615 LACTATE (LD) (LDH) ENZYME: CPT | Performed by: INTERNAL MEDICINE

## 2025-02-14 PROCEDURE — 85025 COMPLETE CBC W/AUTO DIFF WBC: CPT | Performed by: INTERNAL MEDICINE

## 2025-02-14 PROCEDURE — 94799 UNLISTED PULMONARY SVC/PX: CPT

## 2025-02-14 PROCEDURE — 25010000002 ENOXAPARIN PER 10 MG: Performed by: ORTHOPAEDIC SURGERY

## 2025-02-14 PROCEDURE — 82550 ASSAY OF CK (CPK): CPT | Performed by: INTERNAL MEDICINE

## 2025-02-14 PROCEDURE — 80053 COMPREHEN METABOLIC PANEL: CPT | Performed by: INTERNAL MEDICINE

## 2025-02-14 RX ORDER — SODIUM BICARBONATE 650 MG/1
650 TABLET ORAL 3 TIMES DAILY
Status: DISCONTINUED | OUTPATIENT
Start: 2025-02-14 | End: 2025-02-20

## 2025-02-14 RX ORDER — OXYCODONE HYDROCHLORIDE 5 MG/1
5 TABLET ORAL EVERY 8 HOURS PRN
Status: DISPENSED | OUTPATIENT
Start: 2025-02-14 | End: 2025-02-19

## 2025-02-14 RX ORDER — PREDNISONE 20 MG/1
40 TABLET ORAL
Status: DISCONTINUED | OUTPATIENT
Start: 2025-02-15 | End: 2025-02-19

## 2025-02-14 RX ADMIN — ENOXAPARIN SODIUM 40 MG: 100 INJECTION SUBCUTANEOUS at 15:09

## 2025-02-14 RX ADMIN — FUROSEMIDE 20 MG: 20 TABLET ORAL at 08:54

## 2025-02-14 RX ADMIN — ASPIRIN 81 MG: 81 TABLET, COATED ORAL at 08:53

## 2025-02-14 RX ADMIN — OXYCODONE HYDROCHLORIDE 5 MG: 5 TABLET ORAL at 21:00

## 2025-02-14 RX ADMIN — DOXYCYCLINE 100 MG: 100 CAPSULE ORAL at 08:53

## 2025-02-14 RX ADMIN — EMPAGLIFLOZIN 25 MG: 25 TABLET, FILM COATED ORAL at 08:54

## 2025-02-14 RX ADMIN — IPRATROPIUM BROMIDE AND ALBUTEROL SULFATE 3 ML: 2.5; .5 SOLUTION RESPIRATORY (INHALATION) at 11:47

## 2025-02-14 RX ADMIN — IPRATROPIUM BROMIDE AND ALBUTEROL SULFATE 3 ML: 2.5; .5 SOLUTION RESPIRATORY (INHALATION) at 07:25

## 2025-02-14 RX ADMIN — SODIUM BICARBONATE 650 MG TABLET 650 MG: at 15:09

## 2025-02-14 RX ADMIN — OXYCODONE HYDROCHLORIDE 5 MG: 5 TABLET ORAL at 12:44

## 2025-02-14 RX ADMIN — INSULIN LISPRO 4 UNITS: 100 INJECTION, SOLUTION INTRAVENOUS; SUBCUTANEOUS at 12:39

## 2025-02-14 RX ADMIN — DOXYCYCLINE 100 MG: 100 CAPSULE ORAL at 21:00

## 2025-02-14 RX ADMIN — IPRATROPIUM BROMIDE AND ALBUTEROL SULFATE 3 ML: 2.5; .5 SOLUTION RESPIRATORY (INHALATION) at 19:32

## 2025-02-14 RX ADMIN — CARVEDILOL 12.5 MG: 12.5 TABLET, FILM COATED ORAL at 17:41

## 2025-02-14 RX ADMIN — Medication 10 ML: at 21:00

## 2025-02-14 RX ADMIN — INSULIN LISPRO 4 UNITS: 100 INJECTION, SOLUTION INTRAVENOUS; SUBCUTANEOUS at 17:41

## 2025-02-14 RX ADMIN — GLIPIZIDE 5 MG: 5 TABLET ORAL at 08:53

## 2025-02-14 RX ADMIN — CARVEDILOL 12.5 MG: 12.5 TABLET, FILM COATED ORAL at 12:39

## 2025-02-14 RX ADMIN — CEFTRIAXONE 2000 MG: 2 INJECTION, POWDER, FOR SOLUTION INTRAMUSCULAR; INTRAVENOUS at 08:53

## 2025-02-14 RX ADMIN — INSULIN GLARGINE 10 UNITS: 100 INJECTION, SOLUTION SUBCUTANEOUS at 08:53

## 2025-02-14 RX ADMIN — Medication 10 ML: at 08:30

## 2025-02-14 RX ADMIN — SODIUM BICARBONATE 650 MG TABLET 650 MG: at 21:00

## 2025-02-14 NOTE — PROGRESS NOTES
"  Infectious Diseases Progress Note    Roxanne Duncan MD     [facility]  [place]: 4 days  Patient Identification:  Name: Chrissy Gutierrez  Age: 74 y.o.  Sex: female  :  1950  MRN: [id number]         Primary Care Physician: Jai Steven MD        Subjective: Denies any specific plaints of continued discomfort in her joints especially the left knee.  Denies any fever and chills.  Feeling better except for discomfort in her joint.  Interval History: See consultation note.    Objective:    Scheduled Meds:aspirin, 81 mg, Oral, Daily  carvedilol, 12.5 mg, Oral, BID With Meals  cefTRIAXone, 2,000 mg, Intravenous, Q24H  doxycycline, 100 mg, Oral, Q12H  empagliflozin, 25 mg, Oral, Daily  enoxaparin, 40 mg, Subcutaneous, Q24H  furosemide, 20 mg, Oral, Daily  glipizide, 5 mg, Oral, QAM AC  insulin glargine, 10 Units, Subcutaneous, Daily  insulin lispro, 2-9 Units, Subcutaneous, 4x Daily AC & at Bedtime  ipratropium-albuterol, 3 mL, Nebulization, Q6H While Awake - RT  rosuvastatin, 10 mg, Oral, Nightly  sodium bicarbonate, 650 mg, Oral, Nightly  sodium chloride, 10 mL, Intravenous, Q12H  sodium zirconium cyclosilicate, 5 g, Oral, Every Other Day      Continuous Infusions:     Vital signs in last 24 hours:  Temp:  [96.6 °F (35.9 °C)-97.1 °F (36.2 °C)] 97 °F (36.1 °C)  Heart Rate:  [82-85] 85  Resp:  [16-24] 16  BP: (108-115)/(61-71) 115/71    Intake/Output:    Intake/Output Summary (Last 24 hours) at 2025 0639  Last data filed at 2025 0400  Gross per 24 hour   Intake 710 ml   Output 2500 ml   Net -1790 ml       Exam:  /71 (BP Location: Left arm, Patient Position: Lying)   Pulse 85   Temp 97 °F (36.1 °C) (Oral)   Resp 16   Ht 170.2 cm (67\")   Wt 92.5 kg (204 lb)   SpO2 95%   BMI 31.95 kg/m²   Patient is examined using the personal protective equipment as per guidelines from infection control for this particular patient as enacted.  Hand washing was performed before and after patient " interaction.  General Appearance:    Alert, cooperative, no distress, AAOx3                          Head:    Normocephalic, without obvious abnormality, atraumatic                           Eyes:    PERRL, conjunctivae/corneas clear, EOM's intact, both eyes                         Throat:   Lips, tongue, gums normal; oral mucosa pink and moist                           Neck:   Supple, symmetrical, trachea midline, no JVD                         Lungs:    Clear to auscultation bilaterally, respirations unlabored                 Chest Wall:    No tenderness or deformity                          Heart:  S1-S2 regular                  Abdomen:   Soft nontender                 Extremities: Left knee and lower extremities wrapped in the Ace wrap.                        Pulses:   Pulses palpable in all extremities                            Skin:   Skin is warm and dry,  no rashes or palpable lesions                  Neurologic: Alert and oriented x 3       Data Review:    I reviewed the patient's new clinical results.  Results from last 7 days   Lab Units 02/14/25  0427 02/13/25  0416 02/12/25  0618 02/11/25  0654 02/10/25  0748 02/10/25  0357   WBC 10*3/mm3 15.00* 17.05* 18.00* 17.81* 16.07* 17.10*   HEMOGLOBIN g/dL 9.7* 9.9* 9.4* 10.0* 10.3* 10.4*   PLATELETS 10*3/mm3 430 395 387 408 411 420     Results from last 7 days   Lab Units 02/14/25  0427 02/13/25  0416 02/12/25  0618 02/11/25  0654 02/10/25  0748 02/10/25  0357   SODIUM mmol/L 137 139 141 141 137 136   POTASSIUM mmol/L 4.3 4.3 4.2 4.5 4.5 4.8   CHLORIDE mmol/L 104 104 108* 109* 105 104   CO2 mmol/L 19.0* 18.0* 18.0* 16.4* 15.2* 14.5*   BUN mg/dL 49* 37* 31* 36* 53* 56*   CREATININE mg/dL 1.43* 1.40* 1.25* 1.21* 1.58* 1.73*   CALCIUM mg/dL 8.8 9.0 8.8 8.6 8.6 8.8   GLUCOSE mg/dL 136* 202* 80 98 209* 234*     Microbiology Results (last 10 days)       Procedure Component Value - Date/Time    Tissue / Bone Culture - Tissue, Knee, Left [[id number]] Collected:  02/12/25 1341    Lab Status: Preliminary result Specimen: Tissue from Knee, Left Updated: 02/13/25 1013     Tissue Culture No growth     Gram Stain No WBCs or organisms seen    Body Fluid Culture - Synovial Fluid, Knee, Left [[id number]] Collected: 02/12/25 1327    Lab Status: Preliminary result Specimen: Synovial Fluid from Knee, Left Updated: 02/13/25 1016     Body Fluid Culture No growth     Gram Stain Many (4+) WBCs seen      No organisms seen    Body Fluid Culture - Synovial Fluid, Knee, Left [[id number]] Collected: 02/11/25 1745    Lab Status: Preliminary result Specimen: Synovial Fluid from Knee, Left Updated: 02/13/25 1009     Body Fluid Culture No growth at 2 days     Gram Stain Moderate (3+) WBCs seen      No organisms seen    Urine Culture - Urine, Urine, Clean Catch [[id number]]  (Abnormal)  (Susceptibility) Collected: 02/10/25 0502    Lab Status: Final result Specimen: Urine, Clean Catch Updated: 02/12/25 0903     Urine Culture >100,000 CFU/mL Escherichia coli    Narrative:      Colonization of the urinary tract without infection is common. Treatment is discouraged unless the patient is symptomatic, pregnant, or undergoing an invasive urologic procedure.    Susceptibility        Escherichia coli      TICO      Amoxicillin + Clavulanate Susceptible      Ampicillin Susceptible      Ampicillin + Sulbactam Susceptible      Cefazolin (Urine) Susceptible      Cefepime Susceptible      Ceftazidime Susceptible      Ceftriaxone Susceptible      Gentamicin Susceptible      Levofloxacin Susceptible      Nitrofurantoin Susceptible      Piperacillin + Tazobactam Susceptible      Trimethoprim + Sulfamethoxazole Susceptible                           Blood Culture - Blood, Arm, Left [[id number]]  (Normal) Collected: 02/10/25 0501    Lab Status: Preliminary result Specimen: Blood from Arm, Left Updated: 02/14/25 0515     Blood Culture No growth at 4 days    Blood Culture - Blood, Arm, Left [[id number]]  (Normal) Collected:  02/10/25 0500    Lab Status: Preliminary result Specimen: Blood from Arm, Left Updated: 02/14/25 0515     Blood Culture No growth at 4 days    Respiratory Panel PCR w/COVID-19(SARS-CoV-2) [facility] In-House, NP Swab in UTM/VTM, 2 HR TAT - Swab, Nasopharynx [[id number]]  (Abnormal) Collected: 02/10/25 0401    Lab Status: Final result Specimen: Swab from Nasopharynx Updated: 02/10/25 0824     ADENOVIRUS, PCR Not Detected     Coronavirus 229E Not Detected     Coronavirus HKU1 Not Detected     Coronavirus NL63 Not Detected     Coronavirus OC43 Not Detected     COVID19 Not Detected     Human Metapneumovirus Not Detected     Human Rhinovirus/Enterovirus Not Detected     Influenza A PCR Equivocal     Comment: Appended report. These results have been appended to a previously preliminary verified report.        Influenza B PCR Not Detected     Parainfluenza Virus 1 Not Detected     Parainfluenza Virus 2 Not Detected     Parainfluenza Virus 3 Not Detected     Parainfluenza Virus 4 Not Detected     RSV, PCR Not Detected     Bordetella pertussis pcr Not Detected     Bordetella parapertussis PCR Not Detected     Chlamydophila pneumoniae PCR Not Detected     Mycoplasma pneumo by PCR Not Detected    Narrative:      In the setting of a positive respiratory panel with a viral infection PLUS a negative procalcitonin without other underlying concern for bacterial infection, consider observing off antibiotics or discontinuation of antibiotics and continue supportive care. If the respiratory panel is positive for atypical bacterial infection (Bordetella pertussis, Chlamydophila pneumoniae, or Mycoplasma pneumoniae), consider antibiotic de-escalation to target atypical bacterial infection.              Assessment:    Bacterial pneumonia    Sepsis, unspecified organism    Influenza A    Acute pain of left knee    Type 2 diabetes mellitus with hyperglycemia, with long-term current use of insulin    CKD (chronic kidney disease)  stage 3, GFR 30-59 ml/min    Acute respiratory failure with hypoxia    74-year-old female with  1-painful tender left knee with decline in function status post joint aspiration followed by I&D and washout in the setting of respiratory tract infection with influenza A and abnormal urinalysis consistent with UTI-this presentation of tender painful knee with worsening function and abnormal joint fluid analysis with crystal for uric acid positive could very well be due to combination of multiple pathologies happening together:             -Acute gouty arthritis with             -Secondary septic arthritis due to hematogenous seeding from the lung or urine versus             -Progression of osteoarthritis.  2-acute influenza A with secondary bacterial pneumonia  3-abnormal urinalysis with urine culture without any specific urinary symptoms  4-acute on chronic renal insufficiency with prior history of left-sided hydronephrosis and history of right nephrectomy  5-osteoarthritis of the right shoulder and wrist without any fracture  6-hypertension  7-peripheral neuropathy  8-history of renal cell carcinoma-history of right nephrectomy  9-type 2 diabetes.     Recommendations/Discussions:  See my discussion and recommendations on 2/13/2025.  Management of concomitant gout with short course of oral steroid per primary team and orthopedic surgery service.  Patient will need out of hospital follow-up with orthopedic surgery service as per their recommendations  Ideal treatment for this presentation is discussed will be 4 weeks of IV Rocephin from last surgical intervention on 2/11/2025.  Following orders are written for case management:   Please arrange for 4 weeks of IV Rocephin at 2 g 24 hours starting 2/11/2025.  Check weekly CBC CMP and CRP and call abnormal results at [phone].  Remove midline/PICC line after completion of antibiotic therapy  Please educate patient to reach out to Dr. Duncan on once a week basis at  [phone] to go over review of system to monitor antibiotic toxicity and effectiveness of treatment.  Diagnosis:  1-painful tender left knee with decline in function status post joint aspiration followed by I&D and washout in the setting of respiratory tract infection with influenza A and abnormal urinalysis consistent with UTI-this presentation of tender painful knee with worsening function and abnormal joint fluid analysis with crystal for uric acid positive could very well be due to combination of multiple pathologies happening together:             -Acute gouty arthritis with             -Secondary septic arthritis due to hematogenous seeding from the lung or urine versus             -Progression of osteoarthritis.  2-acute influenza A with secondary bacterial pneumonia  3-abnormal urinalysis with urine culture without any specific urinary symptoms  Roxanne Duncan MD  2/14/2025  06:39 EST    Parts of this note may be an electronic transcription/translation of spoken language to printed text using the Dragon dictation system.

## 2025-02-14 NOTE — CONSULTS
Gastroenterology   Initial Inpatient Consult Note    Referring Provider: Raleigh Boateng MD     Reason for Consultation: Elevated LFTs    Subjective     Thank you for the opportunity to participate in this patient's consultation and care.    History of present illness:    This is a 74-year-old female for whom our GI service has been consulted for elevated liver enzymes.  Her past medical history is significant for diabetes, chronic kidney disease (CKD) stage III, gout, hypertension, and a prior nephrectomy history due to renal cell carcinoma.    The patient presented to the emergency department with left knee pain and progressive weakness, ambulates with a rolling walker.  She reported a fall incidence several weeks prior but did not injure her knee at that time.  Since the fall, she has increased pain in her left knee and has been taking maximum strength Tylenol at least twice daily.  The patient further endorsed that she has been using Tylenol few times a day over the years and also additional Tylenol to manage her respiratory congestion as she was around a family member with the flu a week ago.    - The patient denies alcohol abuse, IV drug abuse, viral hepatitis history. No family history of liver cancer.    - Never had EGD or colonoscopy    - She denies abdominal pain, nausea, or vomiting.  No icteric sclera or jaundice noted. No abdominal ascites.    The patient was admitted for hypoxia, influenza A, and bilateral pneumonia, as well as suspected septic arthritis of the left knee.  Infectious disease has been consulted, along with nephrology, and orthopedics, status post irrigation and debridement of the left knee for suspected septic arthritis, in the setting of bacterial pneumonia and urinary tract infection.  She is currently receiving broad-spectrum antibiotics, including Rocephin and doxycycline.    Results reviewed:  Initial workup in the emergency department revealed the following, , AST 77,  alkaline phosphate 84, WBC 17.10, hemoglobin 10.4, and respiratory panel showed equivocal for influenza A    2/14/2025   I 870 I 209 I 77   I 466 I 169 I 108   I 100 I 80 I 84  Normal bilirubin    Hemoglobin 9.7 I 9.9 I 9.4 I   WBC 15 I 17.05 I 18 I 17    2/12/2025  Soft tissue left knee pathology: Reactive changes, acute and chronic inflammation benign fibrous tissue and adipose  Tissue/bone culture showed no growth at 2 days    Past Medical History:  Past Medical History:   Diagnosis Date    Cancer     right kidney    Chronic kidney disease     Hypertension     Low back pain     Osteoarthritis     Peripheral neuropathy      Past Surgical History:  Past Surgical History:   Procedure Laterality Date    BACK SURGERY  2021    EPIDURAL BLOCK      KIDNEY SURGERY Right 03/08/2017    REMOVED RIGHT KIDNEY    KNEE INCISION AND DRAINAGE Left 2/12/2025    Procedure: KNEE INCISION AND DRAINAGE;  Surgeon: Arvind Ibarra MD;  Location: [facility];  Service: Orthopedics;  Laterality: Left;    LUMBAR DISCECTOMY FUSION INSTRUMENTATION N/A 06/09/2017    Procedure: 1 LEVEL LAMINECTOMY DECOMPRESSION L4 5 EXCISION FACET CYST;  Surgeon: Negrito Oconnell DO;  Location: [facility];  Service:     TUBAL ABDOMINAL LIGATION      WISDOM TOOTH EXTRACTION        Social History:   Social History     Tobacco Use    Smoking status: Never    Smokeless tobacco: Never   Substance Use Topics    Alcohol use: Not Currently     Comment: socially      Family History:  History reviewed. No pertinent family history.    Home Meds:  Medications Prior to Admission   Medication Sig Dispense Refill Last Dose/Taking    acetaminophen (TYLENOL) 500 MG tablet Take 1 tablet by mouth Every 6 (Six) Hours As Needed for Mild Pain.   2/9/2025 Bedtime    amLODIPine-benazepril (LOTREL) 10-40 MG per capsule Take 1 capsule by mouth.   2/9/2025 Morning    aspirin 81 MG EC tablet Take 1 tablet by mouth Daily.   2/9/2025 Morning    carvedilol (COREG)  25 MG tablet Take 1 tablet by mouth.   2/9/2025 Evening    dapagliflozin Propanediol (Farxiga) 10 MG tablet Take 10 mg by mouth Daily.   2/9/2025 Morning    furosemide (LASIX) 20 MG tablet Take 1 tablet by mouth Daily.   2/9/2025 Morning    Garlic 400 MG tablet delayed-release Take 1 tablet by mouth. On hold for sx.   2/9/2025 Morning    glimepiride (AMARYL) 2 MG tablet Take 1 tablet by mouth.   2/9/2025 Morning    hydrochlorothiazide (HYDRODIURIL) 25 MG tablet Take 1 tablet by mouth.   2/9/2025 Morning    insulin glargine (LANTUS, SEMGLEE) 100 UNIT/ML injection Inject 10 Units under the skin into the appropriate area as directed Daily.   2/9/2025 Morning    methocarbamol (ROBAXIN) 500 MG tablet Take 1 tablet by mouth 4 (Four) Times a Day As Needed for Muscle Spasms. 15 tablet 0 2/9/2025 Morning    Misc Natural Products (OSTEO BI-FLEX ADV DOUBLE ST PO) Take 2 tablets by mouth Daily.   2/9/2025 Evening    NIACIN PO Take 1 tablet by mouth Daily.   2/9/2025 Morning    Omega-3 Fatty Acids (FISH OIL CONCENTRATE PO) Take 2,000 mg by mouth Daily.   2/9/2025 Morning    rosuvastatin (CRESTOR) 10 MG tablet Take 1 tablet by mouth Daily.   2/9/2025 Evening    sodium bicarbonate 650 MG tablet Take 1 tablet by mouth Daily.   2/9/2025 Evening    sodium zirconium cyclosilicate (Lokelma) 5 g packet Take 5 g by mouth Every Other Day. Empty entire contents of the packet(s) into a glass with at least 3 tablespoons (45 mL) of water. Stir well and drink immediately; if powder remains in the glass, add water, stir and drink immediately; repeat until no powder remains. Administer other oral medications at least 2 hours before or 2 hours after dose.   2/9/2025 Morning    HYDROcodone-acetaminophen (NORCO) 5-325 MG per tablet Take 1 tablet by mouth Every 6 (Six) Hours As Needed (Pain). 40 tablet 0     metFORMIN (GLUCOPHAGE) 500 MG tablet Take 1 tablet by mouth 2 (Two) Times a Day With Meals.       Multiple Vitamins-Minerals (MULTIVITAMIN  ADULT PO) Take 1 tablet by mouth Daily.       simvastatin (ZOCOR) 20 MG tablet Take 1 tablet by mouth.        Current Meds:   aspirin, 81 mg, Oral, Daily  carvedilol, 12.5 mg, Oral, BID With Meals  cefTRIAXone, 2,000 mg, Intravenous, Q24H  doxycycline, 100 mg, Oral, Q12H  empagliflozin, 25 mg, Oral, Daily  enoxaparin, 40 mg, Subcutaneous, Q24H  furosemide, 20 mg, Oral, Daily  glipizide, 5 mg, Oral, QAM AC  insulin glargine, 10 Units, Subcutaneous, Daily  insulin lispro, 2-9 Units, Subcutaneous, 4x Daily AC & at Bedtime  ipratropium-albuterol, 3 mL, Nebulization, Q6H While Awake - RT  sodium bicarbonate, 650 mg, Oral, Nightly  sodium chloride, 10 mL, Intravenous, Q12H  sodium zirconium cyclosilicate, 5 g, Oral, Every Other Day      Allergies:  Allergies   Allergen Reactions    Ibuprofen Itching and Other (See Comments)     tachycardia       Objective     Vital Signs  Temp:  [96.6 °F (35.9 °C)-98 °F (36.7 °C)] 98 °F (36.7 °C)  Heart Rate:  [60-88] 81  Resp:  [16-24] 16  BP: ()/(61-71) 110/67      Physical Exam  Vitals and nursing note reviewed.   Constitutional:       General: She is not in acute distress.     Appearance: Normal appearance. She is normal weight. She is not ill-appearing, toxic-appearing or diaphoretic.   Eyes:      General: No scleral icterus.     Conjunctiva/sclera: Conjunctivae normal.   Pulmonary:      Effort: Pulmonary effort is normal.   Abdominal:      General: Abdomen is flat. Bowel sounds are normal. There is no distension.      Palpations: Abdomen is soft. There is no mass.      Tenderness: There is no abdominal tenderness. There is no guarding or rebound.      Hernia: No hernia is present.   Skin:     Coloration: Skin is not jaundiced or pale.      Findings: No erythema or rash.   Neurological:      Mental Status: She is alert and oriented to person, place, and time. Mental status is at baseline.   Psychiatric:         Mood and Affect: Mood normal.     Results Review:     Results from  last 7 days   Lab Units 02/14/25  0427 02/13/25  0416 02/12/25  0618   WBC 10*3/mm3 15.00* 17.05* 18.00*   HEMOGLOBIN g/dL 9.7* 9.9* 9.4*   HEMATOCRIT % 28.8* 29.4* 28.2*   PLATELETS 10*3/mm3 430 395 387     Results from last 7 days   Lab Units 02/14/25  0427 02/13/25  0416 02/12/25  0618   SODIUM mmol/L 137 139 141   POTASSIUM mmol/L 4.3 4.3 4.2   CHLORIDE mmol/L 104 104 108*   CO2 mmol/L 19.0* 18.0* 18.0*   BUN mg/dL 49* 37* 31*   CREATININE mg/dL 1.43* 1.40* 1.25*   CALCIUM mg/dL 8.8 9.0 8.8   BILIRUBIN mg/dL 0.2 0.2 0.3   ALK PHOS U/L 111 100 80   ALT (SGPT) U/L 331* 466* 169*   AST (SGOT) U/L 535* 870* 209*   GLUCOSE mg/dL 136* 202* 80         Lab Results   Lab Value Date/Time    LIPASE 54 (H) 12/02/2021 1207       Radiology:  XR Shoulder 2+ View Right   Final Result       1. No acute appearing fracture. Suspect an old distal radius fracture.   2. Decreased acromiohumeral interval suggestive of underlying rotator   cuff tendinopathy or tear.   3. Degenerative AC joint.   4. Moderate first carpal metacarpal joint osteoarthritis       This report was finalized on 2/11/2025 5:05 PM by Dr. Mark Araya M.D on Workstation: [id number]          XR Forearm 2 View Right   Final Result       1. No acute appearing fracture. Suspect an old distal radius fracture.   2. Decreased acromiohumeral interval suggestive of underlying rotator   cuff tendinopathy or tear.   3. Degenerative AC joint.   4. Moderate first carpal metacarpal joint osteoarthritis       This report was finalized on 2/11/2025 5:05 PM by Dr. Mark Araya M.D on Workstation: [id number]          XR Chest 1 View   Final Result   1. Mild atelectasis or pneumonia of the right lung base.           This report was finalized on 2/10/2025 7:12 AM by Dr. Ramiro Groves M.D on Workstation: [id number]          XR Knee 3 View Bilateral   Final Result   1. Moderately severe degenerative arthritis of both knees.   2. No fractures are seen.            This report was finalized on 2/10/2025 7:16 AM by Dr. Ramiro Groves M.D on Workstation: [id number]                Assessment & Plan   Active Hospital Problems    Diagnosis     **Bacterial pneumonia     Sepsis, unspecified organism     Influenza A     Acute pain of left knee     Type 2 diabetes mellitus with hyperglycemia, with long-term current use of insulin     CKD (chronic kidney disease) stage 3, GFR 30-59 ml/min     Acute respiratory failure with hypoxia        Assessment:  Elevated liver enzymes, in the setting of multiple systemic infections, likely reactive.  History of prolonged and frequent use of Tylenol (for knee pain management)  Hypoxia secondary to bilateral pneumonia and influenza  Left knee pain was initially suspected to be septic arthritis; however, the tissue culture from the left knee showed no bacterial organisms, as of 2/12/25 pathology results.  Acute on chronic kidney disease.  History of renal cell carcinoma, status post nephrectomy    Plan:  While LFTs may be initially elevated in the context of multiple infections, including influenza, septic arthritis, bacterial pneumonia, and excessive use of Tylenol, this can indicate an inflammatory response rather than direct liver injury. However, it may also can signal conditions such as viral hepatitis, fatty liver, or other hepatic pathologies.   Order Labs: Liver serologies, EBV, CMV, coagulopathy panel (PT/INR), and acetaminophen level  Liver ultrasound  Medications reviewed: Medications that can potentially affect LFTs include doxycycline, Rocephin (particularly could cause transient elevations in liver enzymes, particularly alkaline phosphate).  As mentioned, the patient is currently asymptomatic and in no acute GI distress.  Labs and liver ultrasound were ordered, further recommendations to follow.    I discussed the patients findings and my recommendations with patient and nursing staff. Gastroenterology team is available for  concerns or questions.             Renetta Heck) LAINE Dunbar, APRN  Dual Certified Family & Adult-[facility] Acute Care Nurse Practitioner  [facility] [place] [address]  Office: [phone]

## 2025-02-14 NOTE — CASE MANAGEMENT/SOCIAL WORK
Continued Stay Note  [facility]     Patient Name: Chrissy Gutierrez  MRN: [id number]  Today's Date: 2/14/2025    Admit Date: 2/10/2025    Plan: SNF- referrals pending- will need pre-cert   Discharge Plan       Row Name 02/14/25 1444       Plan    Plan SNF- referrals pending- will need pre-cert    Patient/Family in Agreement with Plan yes    Plan Comments Multiple SNF referrals pending at this time. Per ID, patient will need 4 weeks IV Rocephin. Transfer packet started and in CCP office. Will need pre-cert. CCP will follow up Monday.                   Discharge Codes    No documentation.                 Expected Discharge Date and Time       Expected Discharge Date Expected Discharge Time    Feb 17, 2025               Elida Hayes RN

## 2025-02-14 NOTE — PROGRESS NOTES
"   LOS: 4 days     Chief Complaint/ Reason for encounter: CKD management    Subjective   02/14/25 : Patient is doing well today with no new complaints  Good appetite with no nausea or vomiting  No shortness of breath chest pain or edema  Voiding well with no dysuria  Denies new complaints        Medical history reviewed:  History of Present Illness    Subjective    History taken from: Chart and patient/family as able    Vital Signs  Temp:  [96.6 °F (35.9 °C)-98 °F (36.7 °C)] 98 °F (36.7 °C)  Heart Rate:  [60-88] 81  Resp:  [16-24] 16  BP: ()/(61-71) 110/67       Wt Readings from Last 1 Encounters:   02/10/25 0330 92.5 kg (204 lb)       Objective:  Vital signs: (most recent): Blood pressure 110/67, pulse 81, temperature 98 °F (36.7 °C), temperature source Oral, resp. rate 16, height 170.2 cm (67\"), weight 92.5 kg (204 lb), SpO2 94%.                Objective:  General Appearance:  Comfortable, obese, well-appearing, in no acute distress and not in pain.  HEENT: Mucous membranes moist  Lungs:  Normal effort and normal respiratory rate.  Breath sounds clear to auscultation: No rhonchi/Rales.  No  respiratory distress.   Heart:  S1, S2 normal.  No murmur.   Abdomen: Abdomen is soft, nontender/nondistended, + bowel sounds  Extremities: Trace edema of bilateral lower extremities  Skin:  Warm and dry with no rashes      Results Review:    Intake/Output:     Intake/Output Summary (Last 24 hours) at 2/14/2025 1158  Last data filed at 2/14/2025 1048  Gross per 24 hour   Intake 470 ml   Output 2350 ml   Net -1880 ml         DATA:  Radiology and Labs:  The following labs independently reviewed by me. Additional labs ordered for tomorrow a.m.  Interval notes, chart personally reviewed by me.   Old records independently reviewed showing CKD 3  The following radiologic studies independently viewed by me, findings nothing new  New problems include metabolic acidosis  Discussed with patient    Risk/ complexity of medical care/ " medical decision making moderate, diuretic management with CKD    Labs:   Recent Results (from the past 24 hours)   POC Glucose Once    Collection Time: 02/13/25  5:21 PM    Specimen: Blood   Result Value Ref Range    Glucose 218 (H) 70 - 130 mg/dL   POC Glucose Once    Collection Time: 02/13/25  8:51 PM    Specimen: Blood   Result Value Ref Range    Glucose 221 (H) 70 - 130 mg/dL   Comprehensive Metabolic Panel    Collection Time: 02/14/25  4:27 AM    Specimen: Blood   Result Value Ref Range    Glucose 136 (H) 65 - 99 mg/dL    BUN 49 (H) 8 - 23 mg/dL    Creatinine 1.43 (H) 0.57 - 1.00 mg/dL    Sodium 137 136 - 145 mmol/L    Potassium 4.3 3.5 - 5.2 mmol/L    Chloride 104 98 - 107 mmol/L    CO2 19.0 (L) 22.0 - 29.0 mmol/L    Calcium 8.8 8.6 - 10.5 mg/dL    Total Protein 7.1 6.0 - 8.5 g/dL    Albumin 2.6 (L) 3.5 - 5.2 g/dL    ALT (SGPT) 331 (H) 1 - 33 U/L    AST (SGOT) 535 (H) 1 - 32 U/L    Alkaline Phosphatase 111 39 - 117 U/L    Total Bilirubin 0.2 0.0 - 1.2 mg/dL    Globulin 4.5 gm/dL    A/G Ratio 0.6 g/dL    BUN/Creatinine Ratio 34.3 (H) 7.0 - 25.0    Anion Gap 14.0 5.0 - 15.0 mmol/L    eGFR 38.6 (L) >60.0 mL/min/1.73   CBC Auto Differential    Collection Time: 02/14/25  4:27 AM    Specimen: Blood   Result Value Ref Range    WBC 15.00 (H) 3.40 - 10.80 10*3/mm3    RBC 3.27 (L) 3.77 - 5.28 10*6/mm3    Hemoglobin 9.7 (L) 12.0 - 15.9 g/dL    Hematocrit 28.8 (L) 34.0 - 46.6 %    MCV 88.1 79.0 - 97.0 fL    MCH 29.7 26.6 - 33.0 pg    MCHC 33.7 31.5 - 35.7 g/dL    RDW 12.3 12.3 - 15.4 %    RDW-SD 39.3 37.0 - 54.0 fl    MPV 9.3 6.0 - 12.0 fL    Platelets 430 140 - 450 10*3/mm3    Neutrophil % 88.1 (H) 42.7 - 76.0 %    Lymphocyte % 7.0 (L) 19.6 - 45.3 %    Monocyte % 4.0 (L) 5.0 - 12.0 %    Eosinophil % 0.0 (L) 0.3 - 6.2 %    Basophil % 0.1 0.0 - 1.5 %    Immature Grans % 0.8 (H) 0.0 - 0.5 %    Neutrophils, Absolute 13.22 (H) 1.70 - 7.00 10*3/mm3    Lymphocytes, Absolute 1.05 0.70 - 3.10 10*3/mm3    Monocytes, Absolute 0.60  0.10 - 0.90 10*3/mm3    Eosinophils, Absolute 0.00 0.00 - 0.40 10*3/mm3    Basophils, Absolute 0.01 0.00 - 0.20 10*3/mm3    Immature Grans, Absolute 0.12 (H) 0.00 - 0.05 10*3/mm3    nRBC 0.0 0.0 - 0.2 /100 WBC   POC Glucose Once    Collection Time: 02/14/25  7:24 AM    Specimen: Blood   Result Value Ref Range    Glucose 124 70 - 130 mg/dL   POC Glucose Once    Collection Time: 02/14/25 11:32 AM    Specimen: Blood   Result Value Ref Range    Glucose 267 (H) 70 - 130 mg/dL       Radiology:  Pertinent radiology studies were reviewed as described above      Medications have been reviewed separately in chart review medication tab      ASSESSMENT:  CKD stage IIIa, stable, near baseline    Bacterial pneumonia, on IV/p.o. antibiotics    Sepsis, unspecified organism    Influenza A  Chronic diastolic CHF, back on oral diuretics today    Acute pain of left knee    Type 2 diabetes mellitus with hyperglycemia, with long-term current use of insulin    CKD (chronic kidney disease) stage 3, GFR 30-59 ml/min    Acute respiratory failure with hypoxia           DISCUSSION/PLAN:   Creatinine level remains very stable today, near baseline creatinine of around 1.4-1.8  Clinically euvolemic on exam  Oral Lasix has been restarted today, volume status stable  Currently no plans for CT with IV contrast  Antibiotics per infectious disease, dosed for GFR around 40  Her potassium has been stable so we will hold Lokelma for now  Continue Jardiance  Increase sodium bicarb to 3 times daily dosing  Follow-up a.m. labs    Continue to monitor electrolytes and volume closely, avoid IV contrast and nephrotoxic medications        Antwan Vieira MD  [facility]   Office phone number: [phone]  Answering service phone number: [phone]    02/14/25  11:58 EST    Dictation performed using Dragon dictation software

## 2025-02-14 NOTE — PLAN OF CARE
Goal Outcome Evaluation:      No acute events. 2LPM nasal cannula. Voids per purewick. A&Ox4. VSS. Continues with congested cough.

## 2025-02-14 NOTE — PROGRESS NOTES
Name: Chrissy Gutierrez ADMIT: 2/10/2025   : 1950  PCP: Jai Steven MD    MRN: [id number] LOS: 4 days   AGE/SEX: 74 y.o. female  ROOM: [facility]     Subjective   Subjective   Patient is seen at bedside, she is lying in bed, no acute issues noted.  Liver enzymes have been elevated, will consult GI.       Objective   Objective   Vital Signs  Temp:  [97 °F (36.1 °C)-98 °F (36.7 °C)] 98 °F (36.7 °C)  Heart Rate:  [60-88] 81  Resp:  [16-24] 16  BP: ()/(61-71) 112/68  SpO2:  [94 %-96 %] 94 %  on  Flow (L/min) (Oxygen Therapy):  [2] 2;   Device (Oxygen Therapy): nasal cannula  Body mass index is 31.95 kg/m².  Physical Exam  General, awake and alert.  Head and ENT, normocephalic and atraumatic.  Lungs, symmetric expansion, equal air entry bilaterally.  Heart, regular rate and rhythm.  Abdomen, soft and nontender.  Extremities, no clubbing or cyanosis.  Neuro, no focal deficits.  Skin: Warm and no rash.  Psych, normal mood and affect.  Musculoskeletal, joint examination is grossly normal.     Copied text material from yesterday's note has been reviewed for appropriate changes and remains accurate as of 25.         Results Review     I reviewed the patient's new clinical results.  Results from last 7 days   Lab Units 25  0654   WBC 10*3/mm3 15.00* 17.05* 18.00* 17.81*   HEMOGLOBIN g/dL 9.7* 9.9* 9.4* 10.0*   PLATELETS 10*3/mm3 430 395 387 408     Results from last 7 days   Lab Units 25  0654   SODIUM mmol/L 137 139 141 141   POTASSIUM mmol/L 4.3 4.3 4.2 4.5   CHLORIDE mmol/L 104 104 108* 109*   CO2 mmol/L 19.0* 18.0* 18.0* 16.4*   BUN mg/dL 49* 37* 31* 36*   CREATININE mg/dL 1.43* 1.40* 1.25* 1.21*   GLUCOSE mg/dL 136* 202* 80 98   EGFR mL/min/1.73 38.6* 39.6* 45.3* 47.1*     Results from last 7 days   Lab Units 25  0427 25  0416 25  0618 02/10/25  0357   ALBUMIN g/dL 2.6* 2.7*  2.8* 3.2*   BILIRUBIN mg/dL 0.2 0.2 0.3 0.3   ALK PHOS U/L 111 100 80 84   AST (SGOT) U/L 535* 870* 209* 77*   ALT (SGPT) U/L 331* 466* 169* 108*     Results from last 7 days   Lab Units 02/14/25  0427 02/13/25  0416 02/12/25  0618 02/11/25  0654 02/10/25  0748 02/10/25  0357   CALCIUM mg/dL 8.8 9.0 8.8 8.6   < > 8.8   ALBUMIN g/dL 2.6* 2.7* 2.8*  --   --  3.2*    < > = values in this interval not displayed.     Results from last 7 days   Lab Units 02/10/25  0357   PROCALCITONIN ng/mL 0.95*   LACTATE mmol/L 1.2     Glucose   Date/Time Value Ref Range Status   02/14/2025 1132 267 (H) 70 - 130 mg/dL Final   02/14/2025 0724 124 70 - 130 mg/dL Final   02/13/2025 2051 221 (H) 70 - 130 mg/dL Final   02/13/2025 1721 218 (H) 70 - 130 mg/dL Final   02/13/2025 1122 227 (H) 70 - 130 mg/dL Final   02/13/2025 0722 163 (H) 70 - 130 mg/dL Final   02/12/2025 2009 250 (H) 70 - 130 mg/dL Final       No radiology results for the last day    I have personally reviewed all medications:  Scheduled Medications  aspirin, 81 mg, Oral, Daily  carvedilol, 12.5 mg, Oral, BID With Meals  cefTRIAXone, 2,000 mg, Intravenous, Q24H  doxycycline, 100 mg, Oral, Q12H  empagliflozin, 25 mg, Oral, Daily  enoxaparin, 40 mg, Subcutaneous, Q24H  furosemide, 20 mg, Oral, Daily  glipizide, 5 mg, Oral, QAM AC  insulin glargine, 10 Units, Subcutaneous, Daily  insulin lispro, 2-9 Units, Subcutaneous, 4x Daily AC & at Bedtime  ipratropium-albuterol, 3 mL, Nebulization, Q6H While Awake - RT  [START ON 2/15/2025] predniSONE, 40 mg, Oral, Daily With Breakfast  sodium bicarbonate, 650 mg, Oral, TID  sodium chloride, 10 mL, Intravenous, Q12H    Infusions   Diet  Diet: Diabetic; Consistent Carbohydrate; Fluid Consistency: Thin (IDDSI 0)    I have personally reviewed:  [x]  Laboratory   [x]  Microbiology   [x]  Radiology   [x]  EKG/Telemetry  [x]  Cardiology/Vascular   []  Pathology    []  Records       Assessment/Plan     Active Hospital Problems    Diagnosis  POA     **Bacterial pneumonia [J15.9]  Yes    Sepsis, unspecified organism [A41.9]  Yes    Influenza A [J10.1]  Yes    Acute pain of left knee [M25.562]  Yes    Type 2 diabetes mellitus with hyperglycemia, with long-term current use of insulin [E11.65, Z79.4]  Not Applicable    CKD (chronic kidney disease) stage 3, GFR 30-59 ml/min [N18.30]  Yes    Acute respiratory failure with hypoxia [J96.01]  Yes      Resolved Hospital Problems   No resolved problems to display.       74 y.o. female admitted with Bacterial pneumonia.    Assessment and plan  1.  Acute respiratory failure with hypoxia, bacterial pneumonia, recent history of flu infection, superimposed bacterial pneumonia, treated with antibiotics, continue Rocephin, added doxycycline for atypical coverage.     2.  Acute pain in the left knee, status post orthopedics, suspected septic arthritis, S/P OR for debridement and washout.  Case discussed with infectious disease, based on patient's clinical presentation, it would be reasonable to complete 4 weeks of IV antibiotics presuming it is septic arthritis.  Patient reluctant to go on IV antibiotics, alternate plan for oral antibiotics have been discussed with ID.     3.  Acute on chronic kidney injury, renal function has improved, appears to be close to baseline.  Avoid nephrotoxic medications.  Nephrology on board and following.     4.  Diabetes mellitus, continue Accu-Cheks and sliding scale insulin coverage.     5.  Transaminitis, etiology unclear, consulted gastroenterology, appreciate input.     6.  CODE STATUS is full code.  Further plans based on hospital course.  Expected Discharge Date: 2/17/2025; Expected Discharge Time:       Raleigh Boateng MD  [facility]  02/14/25  14:43 EST

## 2025-02-14 NOTE — SIGNIFICANT NOTE
02/14/25 1547   OTHER   Discipline physical therapist   Rehab Time/Intention   Session Not Performed patient/family declined treatment;other (see comments)  (Pt. reports up with RN to [facility]. Swelling to the knee increasing, and shed like to rest with ice pack. Agreeable to be up to chair for meals. Will f/u next service date.)   Recommendation   PT - Next Appointment 02/15/25

## 2025-02-14 NOTE — PROGRESS NOTES
Patient is very concerned about her ability to get IV antibiotics and was hoping that she would get oral antibiotics for her infection.  I explained the patient about ideal treatment for this situation as recommended in my progress note and orders for the case management.  If patient after being explained the risks and benefits of her decision making after using oral antibiotics over IV antibiotics makes the decision to go with oral antibiotics then she could be switched to oral Keflex 500 mg every 8 hours for 28 days from 2/11/2025.  With ongoing continued management of gout  Patient will need close follow-up with the primary care provider with once weekly lab work to monitor antibiotic toxicity and side effects with instructions to reach out to Dr. Duncan on a once a week basis to go over review of system to monitor antibiotic toxicity and side effects and have a primary care provider reach out to me if there is need for discussion of further management of her left knee infection.

## 2025-02-14 NOTE — PLAN OF CARE
Goal Outcome Evaluation:      [place] to [facility] [place] max 2 assist this am. Activity encouraged and patient educated on importance of increasing activity. Patient refused activity this shift, citing fatigue and increased left knee discomfort. GI consult r/t elevated LFTs. Patient NPO this evening pending a liver ultrasound. Roxicodone added for pain control. Patient continues with dry, non-productive cough, but oxygen weaned to RA. Small BM this shift. Bowel regimen encouraged with narcotic intake, patient refused during shift. Continuing to monitor.

## 2025-02-15 ENCOUNTER — INPATIENT HOSPITAL (OUTPATIENT)
Dept: URBAN - METROPOLITAN AREA HOSPITAL 113 | Facility: HOSPITAL | Age: 75
End: 2025-02-15
Payer: MEDICARE

## 2025-02-15 DIAGNOSIS — R74.01 ELEVATION OF LEVELS OF LIVER TRANSAMINASE LEVELS: ICD-10-CM

## 2025-02-15 LAB
ALBUMIN SERPL-MCNC: 2.9 G/DL (ref 3.5–5.2)
ALPHA-FETOPROTEIN: 3.19 NG/ML (ref 0–8.3)
ANION GAP SERPL CALCULATED.3IONS-SCNC: 13 MMOL/L (ref 5–15)
APAP SERPL-MCNC: <5 MCG/ML (ref 0–30)
BACTERIA SPEC AEROBE CULT: NORMAL
BASOPHILS # BLD AUTO: 0.01 10*3/MM3 (ref 0–0.2)
BASOPHILS NFR BLD AUTO: 0.1 % (ref 0–1.5)
BUN SERPL-MCNC: 55 MG/DL (ref 8–23)
BUN/CREAT SERPL: 47.8 (ref 7–25)
CALCIUM SPEC-SCNC: 8.6 MG/DL (ref 8.6–10.5)
CERULOPLASMIN SERPL-MCNC: 27 MG/DL (ref 19–39)
CHLORIDE SERPL-SCNC: 102 MMOL/L (ref 98–107)
CO2 SERPL-SCNC: 22 MMOL/L (ref 22–29)
CREAT SERPL-MCNC: 1.15 MG/DL (ref 0.57–1)
DEPRECATED RDW RBC AUTO: 40.5 FL (ref 37–54)
EGFRCR SERPLBLD CKD-EPI 2021: 50.1 ML/MIN/1.73
EOSINOPHIL # BLD AUTO: 0.02 10*3/MM3 (ref 0–0.4)
EOSINOPHIL NFR BLD AUTO: 0.2 % (ref 0.3–6.2)
ERYTHROCYTE [DISTWIDTH] IN BLOOD BY AUTOMATED COUNT: 12.4 % (ref 12.3–15.4)
GGT SERPL-CCNC: 49 U/L (ref 5–36)
GLUCOSE BLDC GLUCOMTR-MCNC: 234 MG/DL (ref 70–130)
GLUCOSE BLDC GLUCOMTR-MCNC: 353 MG/DL (ref 70–130)
GLUCOSE BLDC GLUCOMTR-MCNC: 420 MG/DL (ref 70–130)
GLUCOSE BLDC GLUCOMTR-MCNC: 479 MG/DL (ref 70–130)
GLUCOSE SERPL-MCNC: 137 MG/DL (ref 65–99)
GRAM STN SPEC: NORMAL
HAV IGM SERPL QL IA: NORMAL
HBV CORE IGM SERPL QL IA: NORMAL
HBV SURFACE AG SERPL QL IA: NORMAL
HCT VFR BLD AUTO: 29.8 % (ref 34–46.6)
HCV AB SER QL: NORMAL
HGB BLD-MCNC: 10 G/DL (ref 12–15.9)
IGA1 MFR SER: 249 MG/DL (ref 70–400)
IGG1 SER-MCNC: 1150 MG/DL (ref 700–1600)
IGM SERPL-MCNC: 177 MG/DL (ref 40–230)
IMM GRANULOCYTES # BLD AUTO: 0.1 10*3/MM3 (ref 0–0.05)
IMM GRANULOCYTES NFR BLD AUTO: 1 % (ref 0–0.5)
INR PPP: 1.16 (ref 0.9–1.1)
LYMPHOCYTES # BLD AUTO: 1.46 10*3/MM3 (ref 0.7–3.1)
LYMPHOCYTES NFR BLD AUTO: 14.4 % (ref 19.6–45.3)
MCH RBC QN AUTO: 30 PG (ref 26.6–33)
MCHC RBC AUTO-ENTMCNC: 33.6 G/DL (ref 31.5–35.7)
MCV RBC AUTO: 89.5 FL (ref 79–97)
MONOCYTES # BLD AUTO: 0.71 10*3/MM3 (ref 0.1–0.9)
MONOCYTES NFR BLD AUTO: 7 % (ref 5–12)
NEUTROPHILS NFR BLD AUTO: 7.86 10*3/MM3 (ref 1.7–7)
NEUTROPHILS NFR BLD AUTO: 77.3 % (ref 42.7–76)
NRBC BLD AUTO-RTO: 0 /100 WBC (ref 0–0.2)
PHOSPHATE SERPL-MCNC: 4.1 MG/DL (ref 2.5–4.5)
PLATELET # BLD AUTO: 485 10*3/MM3 (ref 140–450)
PMV BLD AUTO: 9.2 FL (ref 6–12)
POTASSIUM SERPL-SCNC: 4.3 MMOL/L (ref 3.5–5.2)
PROTHROMBIN TIME: 15.1 SECONDS (ref 11.7–14.2)
RBC # BLD AUTO: 3.33 10*6/MM3 (ref 3.77–5.28)
SODIUM SERPL-SCNC: 137 MMOL/L (ref 136–145)
WBC NRBC COR # BLD AUTO: 10.16 10*3/MM3 (ref 3.4–10.8)

## 2025-02-15 PROCEDURE — 86258 DGP ANTIBODY EACH IG CLASS: CPT | Performed by: NURSE PRACTITIONER

## 2025-02-15 PROCEDURE — 80069 RENAL FUNCTION PANEL: CPT | Performed by: INTERNAL MEDICINE

## 2025-02-15 PROCEDURE — 94664 DEMO&/EVAL PT USE INHALER: CPT

## 2025-02-15 PROCEDURE — 63710000001 INSULIN LISPRO (HUMAN) PER 5 UNITS

## 2025-02-15 PROCEDURE — 94761 N-INVAS EAR/PLS OXIMETRY MLT: CPT

## 2025-02-15 PROCEDURE — 85610 PROTHROMBIN TIME: CPT | Performed by: NURSE PRACTITIONER

## 2025-02-15 PROCEDURE — 25010000002 ENOXAPARIN PER 10 MG: Performed by: ORTHOPAEDIC SURGERY

## 2025-02-15 PROCEDURE — 80074 ACUTE HEPATITIS PANEL: CPT | Performed by: NURSE PRACTITIONER

## 2025-02-15 PROCEDURE — 97530 THERAPEUTIC ACTIVITIES: CPT

## 2025-02-15 PROCEDURE — 82948 REAGENT STRIP/BLOOD GLUCOSE: CPT

## 2025-02-15 PROCEDURE — 86645 CMV ANTIBODY IGM: CPT | Performed by: NURSE PRACTITIONER

## 2025-02-15 PROCEDURE — 94799 UNLISTED PULMONARY SVC/PX: CPT

## 2025-02-15 PROCEDURE — 25010000002 CEFTRIAXONE PER 250 MG: Performed by: INTERNAL MEDICINE

## 2025-02-15 PROCEDURE — 82105 ALPHA-FETOPROTEIN SERUM: CPT | Performed by: NURSE PRACTITIONER

## 2025-02-15 PROCEDURE — 80143 DRUG ASSAY ACETAMINOPHEN: CPT | Performed by: NURSE PRACTITIONER

## 2025-02-15 PROCEDURE — 82977 ASSAY OF GGT: CPT | Performed by: NURSE PRACTITIONER

## 2025-02-15 PROCEDURE — 82390 ASSAY OF CERULOPLASMIN: CPT | Performed by: NURSE PRACTITIONER

## 2025-02-15 PROCEDURE — 86364 TISS TRNSGLTMNASE EA IG CLAS: CPT | Performed by: NURSE PRACTITIONER

## 2025-02-15 PROCEDURE — 86015 ACTIN ANTIBODY EACH: CPT | Performed by: NURSE PRACTITIONER

## 2025-02-15 PROCEDURE — 86644 CMV ANTIBODY: CPT | Performed by: NURSE PRACTITIONER

## 2025-02-15 PROCEDURE — 99232 SBSQ HOSP IP/OBS MODERATE 35: CPT | Performed by: INTERNAL MEDICINE

## 2025-02-15 PROCEDURE — 63710000001 PREDNISONE PER 1 MG: Performed by: INTERNAL MEDICINE

## 2025-02-15 PROCEDURE — 86231 EMA EACH IG CLASS: CPT | Performed by: NURSE PRACTITIONER

## 2025-02-15 PROCEDURE — 94760 N-INVAS EAR/PLS OXIMETRY 1: CPT

## 2025-02-15 PROCEDURE — 63710000001 INSULIN GLARGINE PER 5 UNITS: Performed by: ORTHOPAEDIC SURGERY

## 2025-02-15 PROCEDURE — 86665 EPSTEIN-BARR CAPSID VCA: CPT | Performed by: NURSE PRACTITIONER

## 2025-02-15 PROCEDURE — 86381 MITOCHONDRIAL ANTIBODY EACH: CPT | Performed by: NURSE PRACTITIONER

## 2025-02-15 PROCEDURE — 85025 COMPLETE CBC W/AUTO DIFF WBC: CPT | Performed by: INTERNAL MEDICINE

## 2025-02-15 PROCEDURE — 82784 ASSAY IGA/IGD/IGG/IGM EACH: CPT | Performed by: NURSE PRACTITIONER

## 2025-02-15 PROCEDURE — 63710000001 INSULIN LISPRO (HUMAN) PER 5 UNITS: Performed by: ORTHOPAEDIC SURGERY

## 2025-02-15 RX ORDER — INSULIN LISPRO 100 [IU]/ML
5 INJECTION, SOLUTION INTRAVENOUS; SUBCUTANEOUS ONCE
Status: COMPLETED | OUTPATIENT
Start: 2025-02-16 | End: 2025-02-15

## 2025-02-15 RX ADMIN — Medication 10 ML: at 08:19

## 2025-02-15 RX ADMIN — INSULIN LISPRO 4 UNITS: 100 INJECTION, SOLUTION INTRAVENOUS; SUBCUTANEOUS at 11:56

## 2025-02-15 RX ADMIN — DOXYCYCLINE 100 MG: 100 CAPSULE ORAL at 20:56

## 2025-02-15 RX ADMIN — IPRATROPIUM BROMIDE AND ALBUTEROL SULFATE 3 ML: 2.5; .5 SOLUTION RESPIRATORY (INHALATION) at 14:07

## 2025-02-15 RX ADMIN — INSULIN LISPRO 8 UNITS: 100 INJECTION, SOLUTION INTRAVENOUS; SUBCUTANEOUS at 17:22

## 2025-02-15 RX ADMIN — OXYCODONE HYDROCHLORIDE 5 MG: 5 TABLET ORAL at 15:38

## 2025-02-15 RX ADMIN — ENOXAPARIN SODIUM 40 MG: 100 INJECTION SUBCUTANEOUS at 15:37

## 2025-02-15 RX ADMIN — PREDNISONE 40 MG: 20 TABLET ORAL at 08:19

## 2025-02-15 RX ADMIN — EMPAGLIFLOZIN 25 MG: 25 TABLET, FILM COATED ORAL at 08:19

## 2025-02-15 RX ADMIN — GLIPIZIDE 5 MG: 5 TABLET ORAL at 08:18

## 2025-02-15 RX ADMIN — INSULIN LISPRO 9 UNITS: 100 INJECTION, SOLUTION INTRAVENOUS; SUBCUTANEOUS at 20:56

## 2025-02-15 RX ADMIN — IPRATROPIUM BROMIDE AND ALBUTEROL SULFATE 3 ML: 2.5; .5 SOLUTION RESPIRATORY (INHALATION) at 21:21

## 2025-02-15 RX ADMIN — Medication 10 ML: at 21:54

## 2025-02-15 RX ADMIN — ASPIRIN 81 MG: 81 TABLET, COATED ORAL at 08:19

## 2025-02-15 RX ADMIN — INSULIN LISPRO 5 UNITS: 100 INJECTION, SOLUTION INTRAVENOUS; SUBCUTANEOUS at 23:23

## 2025-02-15 RX ADMIN — IPRATROPIUM BROMIDE AND ALBUTEROL SULFATE 3 ML: 2.5; .5 SOLUTION RESPIRATORY (INHALATION) at 07:27

## 2025-02-15 RX ADMIN — SODIUM BICARBONATE 650 MG TABLET 650 MG: at 15:37

## 2025-02-15 RX ADMIN — INSULIN GLARGINE 10 UNITS: 100 INJECTION, SOLUTION SUBCUTANEOUS at 08:18

## 2025-02-15 RX ADMIN — SODIUM BICARBONATE 650 MG TABLET 650 MG: at 20:56

## 2025-02-15 RX ADMIN — SODIUM BICARBONATE 650 MG TABLET 650 MG: at 08:19

## 2025-02-15 RX ADMIN — CEFTRIAXONE 2000 MG: 2 INJECTION, POWDER, FOR SOLUTION INTRAMUSCULAR; INTRAVENOUS at 08:18

## 2025-02-15 RX ADMIN — FUROSEMIDE 20 MG: 20 TABLET ORAL at 08:26

## 2025-02-15 RX ADMIN — CARVEDILOL 12.5 MG: 12.5 TABLET, FILM COATED ORAL at 17:21

## 2025-02-15 RX ADMIN — OXYCODONE HYDROCHLORIDE 5 MG: 5 TABLET ORAL at 06:28

## 2025-02-15 RX ADMIN — DOXYCYCLINE 100 MG: 100 CAPSULE ORAL at 08:18

## 2025-02-15 NOTE — PLAN OF CARE
Problem: Adult Inpatient Plan of Care  Goal: Plan of Care Review  Outcome: Progressing  Flowsheets (Taken 2/15/2025 0429)  Progress: improving  Plan of Care Reviewed With: patient  Goal: Absence of Hospital-Acquired Illness or Injury  Outcome: Progressing  Intervention: Identify and Manage Fall Risk  Recent Flowsheet Documentation  Taken 2/15/2025 0419 by Venice Mills, RN  Safety Promotion/Fall Prevention:   assistive device/personal items within reach   clutter free environment maintained   fall prevention program maintained   nonskid shoes/slippers when out of bed   room organization consistent   safety round/check completed  Taken 2/15/2025 0249 by Venice Mills, RN  Safety Promotion/Fall Prevention:   assistive device/personal items within reach   clutter free environment maintained   fall prevention program maintained   nonskid shoes/slippers when out of bed   room organization consistent   safety round/check completed  Taken 2/15/2025 0028 by Venice Mills, RN  Safety Promotion/Fall Prevention:   assistive device/personal items within reach   clutter free environment maintained   fall prevention program maintained   nonskid shoes/slippers when out of bed   room organization consistent   safety round/check completed  Taken 2/14/2025 2222 by Venice Mills, RN  Safety Promotion/Fall Prevention:   assistive device/personal items within reach   clutter free environment maintained   fall prevention program maintained   lighting adjusted   nonskid shoes/slippers when out of bed   room organization consistent   safety round/check completed  Taken 2/14/2025 2053 by Venice Mills, RN  Safety Promotion/Fall Prevention:   activity supervised   assistive device/personal items within reach   clutter free environment maintained   fall prevention program maintained   lighting adjusted   nonskid shoes/slippers when out of bed   room organization consistent   safety round/check completed  Intervention: Prevent Skin  Injury  Recent Flowsheet Documentation  Taken 2/15/2025 0419 by Venice Mills RN  Body Position: position changed independently  Taken 2/15/2025 0249 by Venice Mills RN  Body Position: position changed independently  Taken 2/15/2025 0028 by Venice Mills RN  Body Position: position changed independently  Taken 2/14/2025 2222 by Venice Mills RN  Body Position: position changed independently  Taken 2/14/2025 2053 by Venice Mills RN  Body Position: position changed independently  Intervention: Prevent and Manage VTE (Venous Thromboembolism) Risk  Recent Flowsheet Documentation  Taken 2/14/2025 2053 by Venice Mills RN  VTE Prevention/Management: patient refused intervention  Intervention: Prevent Infection  Recent Flowsheet Documentation  Taken 2/14/2025 2053 by Venice Mills RN  Infection Prevention:   environmental surveillance performed   hand hygiene promoted   personal protective equipment utilized   rest/sleep promoted   single patient room provided  Goal: Optimal Comfort and Wellbeing  Outcome: Progressing  Intervention: Provide Person-Centered Care  Recent Flowsheet Documentation  Taken 2/14/2025 2053 by Venice Mills RN  Trust Relationship/Rapport:   care explained   questions answered   questions encouraged  Goal: Readiness for Transition of Care  Outcome: Progressing     Problem: Fall Injury Risk  Goal: Absence of Fall and Fall-Related Injury  Outcome: Progressing  Intervention: Identify and Manage Contributors  Recent Flowsheet Documentation  Taken 2/14/2025 2053 by Venice Mills RN  Medication Review/Management: medications reviewed  Intervention: Promote Injury-Free Environment  Recent Flowsheet Documentation  Taken 2/15/2025 0419 by Venice Mills RN  Safety Promotion/Fall Prevention:   assistive device/personal items within reach   clutter free environment maintained   fall prevention program maintained   nonskid shoes/slippers when out of bed   room organization consistent   safety  round/check completed  Taken 2/15/2025 0249 by Venice Mills, RN  Safety Promotion/Fall Prevention:   assistive device/personal items within reach   clutter free environment maintained   fall prevention program maintained   nonskid shoes/slippers when out of bed   room organization consistent   safety round/check completed  Taken 2/15/2025 0028 by Venice Mills, RALPH  Safety Promotion/Fall Prevention:   assistive device/personal items within reach   clutter free environment maintained   fall prevention program maintained   nonskid shoes/slippers when out of bed   room organization consistent   safety round/check completed  Taken 2/14/2025 2222 by Venice Mills RN  Safety Promotion/Fall Prevention:   assistive device/personal items within reach   clutter free environment maintained   fall prevention program maintained   lighting adjusted   nonskid shoes/slippers when out of bed   room organization consistent   safety round/check completed  Taken 2/14/2025 2053 by Venice Mills RN  Safety Promotion/Fall Prevention:   activity supervised   assistive device/personal items within reach   clutter free environment maintained   fall prevention program maintained   lighting adjusted   nonskid shoes/slippers when out of bed   room organization consistent   safety round/check completed     Problem: Sepsis/Septic Shock  Goal: Optimal Coping  Outcome: Progressing  Goal: Absence of Bleeding  Outcome: Progressing  Goal: Blood Glucose Level Within Target Range  Outcome: Progressing  Goal: Absence of Infection Signs and Symptoms  Outcome: Progressing  Intervention: Initiate Sepsis Management  Recent Flowsheet Documentation  Taken 2/14/2025 2053 by Venice Mills RN  Infection Prevention:   environmental surveillance performed   hand hygiene promoted   personal protective equipment utilized   rest/sleep promoted   single patient room provided  Isolation Precautions:   precautions maintained   droplet  Intervention: Promote  Recovery  Recent Flowsheet Documentation  Taken 2/15/2025 0419 by Venice Mills RN  Activity Management: (asleep) other (see comments)  Taken 2/15/2025 0249 by Venice Mills RN  Activity Management: (asleep) other (see comments)  Taken 2/15/2025 0028 by Venice Mills RN  Activity Management: activity minimized  Taken 2/14/2025 2222 by Venice Mills RN  Activity Management: activity minimized  Taken 2/14/2025 2053 by Venice Mills RN  Activity Management: activity encouraged  Goal: Optimal Nutrition Delivery  Outcome: Progressing     Problem: Skin Injury Risk Increased  Goal: Skin Health and Integrity  Outcome: Progressing  Intervention: Optimize Skin Protection  Recent Flowsheet Documentation  Taken 2/15/2025 0419 by Venice Mills RN  Activity Management: (asleep) other (see comments)  Taken 2/15/2025 0249 by Venice Mills RN  Activity Management: (asleep) other (see comments)  Taken 2/15/2025 0028 by Venice Mills RN  Activity Management: activity minimized  Taken 2/14/2025 2222 by Venice Mills RN  Activity Management: activity minimized  Taken 2/14/2025 2053 by Venice Mills RN  Activity Management: activity encouraged   Goal Outcome Evaluation:  Plan of Care Reviewed With: patient        Progress: improving

## 2025-02-15 NOTE — PROGRESS NOTES
Name: Chrissy Gutierrez ADMIT: 2/10/2025   : 1950  PCP: Jai Steven MD    MRN: [id number] LOS: 5 days   AGE/SEX: 74 y.o. female  ROOM: [facility]     Subjective   Subjective   Patient is seen at bedside, patient is lying in bed, no acute issues have been reported to me from overnight.       Objective   Objective   Vital Signs  Temp:  [97.4 °F (36.3 °C)-98.2 °F (36.8 °C)] 97.4 °F (36.3 °C)  Heart Rate:  [69-92] 92  Resp:  [16-18] 18  BP: ()/(63-78) 112/68  SpO2:  [90 %-98 %] 90 %  on  Flow (L/min) (Oxygen Therapy):  [1-2] 2;   Device (Oxygen Therapy): nasal cannula  Body mass index is 31.95 kg/m².  Physical Exam  General, awake and alert.  Head and ENT, normocephalic and atraumatic.  Lungs, symmetric expansion, equal air entry bilaterally.  Heart, regular rate and rhythm.  Abdomen, soft and nontender.  Extremities, no clubbing or cyanosis.  Neuro, no focal deficits.  Skin: Warm and no rash.  Psych, normal mood and affect.  Musculoskeletal, joint examination is grossly normal.     Copied text material from yesterday's note has been reviewed for appropriate changes and remains accurate as of 2/15/25.           Results Review     I reviewed the patient's new clinical results.  Results from last 7 days   Lab Units 02/15/25  0531 02/14/25  0427 02/13/25  0416 02/12/25  0618   WBC 10*3/mm3 10.16 15.00* 17.05* 18.00*   HEMOGLOBIN g/dL 10.0* 9.7* 9.9* 9.4*   PLATELETS 10*3/mm3 485* 430 395 387     Results from last 7 days   Lab Units 02/15/25  0531 02/14/25  0427 02/13/25  0416 02/12/25  0618   SODIUM mmol/L 137 137 139 141   POTASSIUM mmol/L 4.3 4.3 4.3 4.2   CHLORIDE mmol/L 102 104 104 108*   CO2 mmol/L 22.0 19.0* 18.0* 18.0*   BUN mg/dL 55* 49* 37* 31*   CREATININE mg/dL 1.15* 1.43* 1.40* 1.25*   GLUCOSE mg/dL 137* 136* 202* 80   EGFR mL/min/1.73 50.1* 38.6* 39.6* 45.3*     Results from last 7 days   Lab Units 02/15/25  0531 25  0427 25  0416 25  0618 02/10/25  0357   ALBUMIN g/dL  2.9* 2.6* 2.7* 2.8* 3.2*   BILIRUBIN mg/dL  --  0.2 0.2 0.3 0.3   ALK PHOS U/L  --  111 100 80 84   AST (SGOT) U/L  --  535* 870* 209* 77*   ALT (SGPT) U/L  --  331* 466* 169* 108*     Results from last 7 days   Lab Units 02/15/25  0531 02/14/25  0427 02/13/25  0416 02/12/25  0618   CALCIUM mg/dL 8.6 8.8 9.0 8.8   ALBUMIN g/dL 2.9* 2.6* 2.7* 2.8*   PHOSPHORUS mg/dL 4.1  --   --   --      Results from last 7 days   Lab Units 02/10/25  0357   PROCALCITONIN ng/mL 0.95*   LACTATE mmol/L 1.2     Glucose   Date/Time Value Ref Range Status   02/15/2025 1124 234 (H) 70 - 130 mg/dL Final   02/14/2025 2042 138 (H) 70 - 130 mg/dL Final   02/14/2025 1629 230 (H) 70 - 130 mg/dL Final   02/14/2025 1132 267 (H) 70 - 130 mg/dL Final   02/14/2025 0724 124 70 - 130 mg/dL Final   02/13/2025 2051 221 (H) 70 - 130 mg/dL Final   02/13/2025 1721 218 (H) 70 - 130 mg/dL Final       US Liver    Result Date: 2/14/2025  Unremarkable sonographic appearance of the liver and gallbladder/biliary tree.    This report was finalized on 2/14/2025 7:59 PM by Dr. Jed Barahona M.D on Workstation: [id number]       I have personally reviewed all medications:  Scheduled Medications  aspirin, 81 mg, Oral, Daily  carvedilol, 12.5 mg, Oral, BID With Meals  cefTRIAXone, 2,000 mg, Intravenous, Q24H  doxycycline, 100 mg, Oral, Q12H  empagliflozin, 25 mg, Oral, Daily  enoxaparin, 40 mg, Subcutaneous, Q24H  furosemide, 20 mg, Oral, Daily  glipizide, 5 mg, Oral, QAM AC  insulin glargine, 10 Units, Subcutaneous, Daily  insulin lispro, 2-9 Units, Subcutaneous, 4x Daily AC & at Bedtime  ipratropium-albuterol, 3 mL, Nebulization, Q6H While Awake - RT  predniSONE, 40 mg, Oral, Daily With Breakfast  sodium bicarbonate, 650 mg, Oral, TID  sodium chloride, 10 mL, Intravenous, Q12H    Infusions   Diet  Diet: Diabetic; Consistent Carbohydrate; Fluid Consistency: Thin (IDDSI 0)    I have personally reviewed:  [x]  Laboratory   [x]  Microbiology   [x]  Radiology   [x]   EKG/Telemetry  [x]  Cardiology/Vascular   []  Pathology    []  Records       Assessment/Plan     Active Hospital Problems    Diagnosis  POA    **Bacterial pneumonia [J15.9]  Yes    Sepsis, unspecified organism [A41.9]  Yes    Influenza A [J10.1]  Yes    Acute pain of left knee [M25.562]  Yes    Type 2 diabetes mellitus with hyperglycemia, with long-term current use of insulin [E11.65, Z79.4]  Not Applicable    CKD (chronic kidney disease) stage 3, GFR 30-59 ml/min [N18.30]  Yes    Acute respiratory failure with hypoxia [J96.01]  Yes      Resolved Hospital Problems   No resolved problems to display.       74 y.o. female admitted with Bacterial pneumonia.    Assessment and plan  1.  Acute respiratory failure with hypoxia, bacterial pneumonia, recent history of flu infection, superimposed bacterial pneumonia, treated with antibiotics, continue Rocephin, added doxycycline for atypical coverage.     2.  Acute pain in the left knee, status post orthopedics, suspected septic arthritis, S/P OR for debridement and washout.  Case discussed with infectious disease, based on patient's clinical presentation, it would be reasonable to complete 4 weeks of IV antibiotics presuming it is septic arthritis.  Patient reluctant to go on IV antibiotics, alternate plan for oral antibiotics have been discussed with ID.     3.  Acute on chronic kidney injury, renal function has improved, appears to be close to baseline.  Avoid nephrotoxic medications.  Nephrology on board and following.     4.  Diabetes mellitus, continue Accu-Cheks and sliding scale insulin coverage.     5.  Transaminitis, etiology unclear, consulted gastroenterology, appreciate input.     6.  CODE STATUS is full code.  Further plans based on hospital course.  Expected Discharge Date: 2/17/2025; Expected Discharge Time:       Raleigh Boateng MD  [facility]  02/15/25  16:00 EST

## 2025-02-15 NOTE — PLAN OF CARE
Goal Outcome Evaluation:  Plan of Care Reviewed With: patient           Outcome Evaluation: Upon entering room, pt. supine in bed, awake/alert, and agreeable to work with P.T. this date despite c/o Right knee pain (8/10). This PM, pt. able to ambulate 8 feet, Min. assist x 1, with use of Rwx.  Pt. requires CGA x 1 for bed mobility and Min. assist x 1 for sit <-> stand transfers.  Verbal/tactile cues given during ambulation for posture correction and Rwx guidance.  Will continue to progress functional mobility as tolerated.

## 2025-02-15 NOTE — PROGRESS NOTES
[facility]     Progress Note    Patient Name: Chrissy Gutierrez  : 1950  MRN: [id number]  Primary Care Physician:  Jai Steven MD  Date of admission: 2/10/2025    Subjective   Subjective     Chief Complaint: increased liver function tests     History of Present Illness  Patient Reports feeling better.    Review of Systems   Respiratory:  Positive for cough and shortness of breath.    All other systems reviewed and are negative.      Objective   Objective     Vitals:   Temp:  [97.4 °F (36.3 °C)-98.2 °F (36.8 °C)] 98.1 °F (36.7 °C)  Heart Rate:  [69-96] 96  Resp:  [16-18] 18  BP: ()/(63-78) 118/72  Flow (L/min) (Oxygen Therapy):  [1-2] 2    Physical Exam  Constitutional:       Appearance: She is ill-appearing.   HENT:      Right Ear: External ear normal.      Left Ear: External ear normal.      Mouth/Throat:      Mouth: Mucous membranes are moist.      Pharynx: Oropharynx is clear.   Eyes:      Conjunctiva/sclera: Conjunctivae normal.   Cardiovascular:      Rate and Rhythm: Normal rate.   Pulmonary:      Breath sounds: Rhonchi present.   Abdominal:      General: Abdomen is flat.   Neurological:      Mental Status: She is alert.          Result Review    Result Review:  I have personally reviewed the results from the time of this admission to 2/15/2025 18:45 EST and agree with these findings:  []  Laboratory list / accordion  []  Microbiology  []  Radiology  []  EKG/Telemetry   []  Cardiology/Vascular   []  Pathology  []  Old records  []  Other:  Most notable findings include: significantly increased ldh, cpk liver u/s normal       Assessment & Plan   Assessment / Plan     Brief Patient Summary:  Chrissy Gutierrez is a 74 y.o. female who   Increased transaminases- given the increase LDH and CPK I would consider muscle injury as cause of these enzyme elevations as well. Could be superimposed ischemic hepatitis that is improving.     Active Hospital Problems:  Active Hospital Problems    Diagnosis      **Bacterial pneumonia     Sepsis, unspecified organism     Influenza A     Acute pain of left knee     Type 2 diabetes mellitus with hyperglycemia, with long-term current use of insulin     CKD (chronic kidney disease) stage 3, GFR 30-59 ml/min     Acute respiratory failure with hypoxia      Plan: consider other causes of increase ast/ alt given increased ldh and cpk such as muscled injury   BGA to see prn .      VTE Prophylaxis:  Pharmacologic & mechanical VTE prophylaxis orders are present.        CODE STATUS:    Code Status (Patient has no pulse and is not breathing): CPR (Attempt to Resuscitate)  Medical Interventions (Patient has pulse or is breathing): Full Support    Disposition:  I expect patient to be discharged uncertain .    Mike Gallardo MD

## 2025-02-15 NOTE — PROGRESS NOTES
"  Infectious Diseases Progress Note    Roxanne Duncan MD     [facility]  [place]: 5 days  Patient Identification:  Name: Chrissy Gutierrez  Age: 74 y.o.  Sex: female  :  1950  MRN: [id number]         Primary Care Physician: Jai Steven MD        Subjective: Decreased pain and discomfort in her joints including her knee.  Denies any fever and chills.  Interval History: See consultation note.    Objective:    Scheduled Meds:aspirin, 81 mg, Oral, Daily  carvedilol, 12.5 mg, Oral, BID With Meals  cefTRIAXone, 2,000 mg, Intravenous, Q24H  doxycycline, 100 mg, Oral, Q12H  empagliflozin, 25 mg, Oral, Daily  enoxaparin, 40 mg, Subcutaneous, Q24H  furosemide, 20 mg, Oral, Daily  glipizide, 5 mg, Oral, QAM AC  insulin glargine, 10 Units, Subcutaneous, Daily  insulin lispro, 2-9 Units, Subcutaneous, 4x Daily AC & at Bedtime  ipratropium-albuterol, 3 mL, Nebulization, Q6H While Awake - RT  predniSONE, 40 mg, Oral, Daily With Breakfast  sodium bicarbonate, 650 mg, Oral, TID  sodium chloride, 10 mL, Intravenous, Q12H      Continuous Infusions:     Vital signs in last 24 hours:  Temp:  [97.7 °F (36.5 °C)-98.2 °F (36.8 °C)] 97.7 °F (36.5 °C)  Heart Rate:  [69-91] 91  Resp:  [16-18] 16  BP: ()/(63-78) 98/63    Intake/Output:    Intake/Output Summary (Last 24 hours) at 2/15/2025 0826  Last data filed at 2/15/2025 0656  Gross per 24 hour   Intake 1090 ml   Output 1850 ml   Net -760 ml       Exam:  BP 98/63   Pulse 91   Temp 97.7 °F (36.5 °C) (Oral)   Resp 16   Ht 170.2 cm (67\")   Wt 92.5 kg (204 lb)   SpO2 93%   BMI 31.95 kg/m²   Patient is examined using the personal protective equipment as per guidelines from infection control for this particular patient as enacted.  Hand washing was performed before and after patient interaction.  General Appearance:    Alert, cooperative, no distress, AAOx3                          Head:    Normocephalic, without obvious abnormality, atraumatic                 "           Eyes:    PERRL, conjunctivae/corneas clear, EOM's intact, both eyes                         Throat:   Lips, tongue, gums normal; oral mucosa pink and moist                           Neck:   Supple, symmetrical, trachea midline, no JVD                         Lungs:    Clear to auscultation bilaterally, respirations unlabored                 Chest Wall:    No tenderness or deformity                          Heart:  S1-S2 regular                  Abdomen:   Soft nontender                 Extremities: Left knee and lower extremities wrapped in the Ace wrap.                        Pulses:   Pulses palpable in all extremities                            Skin:   Skin is warm and dry,  no rashes or palpable lesions                  Neurologic: Alert and oriented x 3       Data Review:    I reviewed the patient's new clinical results.  Results from last 7 days   Lab Units 02/15/25  0531 02/14/25 0427 02/13/25 0416 02/12/25 0618 02/11/25  0654 02/10/25  0748 02/10/25  0357   WBC 10*3/mm3 10.16 15.00* 17.05* 18.00* 17.81* 16.07* 17.10*   HEMOGLOBIN g/dL 10.0* 9.7* 9.9* 9.4* 10.0* 10.3* 10.4*   PLATELETS 10*3/mm3 485* 430 395 387 408 411 420     Results from last 7 days   Lab Units 02/15/25  0531 02/14/25 0427 02/13/25 0416 02/12/25 0618 02/11/25 0654 02/10/25  0748 02/10/25  0357   SODIUM mmol/L 137 137 139 141 141 137 136   POTASSIUM mmol/L 4.3 4.3 4.3 4.2 4.5 4.5 4.8   CHLORIDE mmol/L 102 104 104 108* 109* 105 104   CO2 mmol/L 22.0 19.0* 18.0* 18.0* 16.4* 15.2* 14.5*   BUN mg/dL 55* 49* 37* 31* 36* 53* 56*   CREATININE mg/dL 1.15* 1.43* 1.40* 1.25* 1.21* 1.58* 1.73*   CALCIUM mg/dL 8.6 8.8 9.0 8.8 8.6 8.6 8.8   GLUCOSE mg/dL 137* 136* 202* 80 98 209* 234*     Microbiology Results (last 10 days)       Procedure Component Value - Date/Time    Tissue / Bone Culture - Tissue, Knee, Left [[id number]] Collected: 02/12/25 1341    Lab Status: Final result Specimen: Tissue from Knee, Left Updated: 02/15/25 4684      Tissue Culture No growth at 3 days     Gram Stain No WBCs or organisms seen    Anaerobic Culture - Synovial Fluid, Knee, Left [[id number]]  (Normal) Collected: 02/12/25 1327    Lab Status: Preliminary result Specimen: Synovial Fluid from Knee, Left Updated: 02/15/25 0746     Anaerobic Culture No anaerobes isolated at 3 days    Body Fluid Culture - Synovial Fluid, Knee, Left [[id number]] Collected: 02/12/25 1327    Lab Status: Preliminary result Specimen: Synovial Fluid from Knee, Left Updated: 02/15/25 0727     Body Fluid Culture No growth at 3 days     Gram Stain Many (4+) WBCs seen      No organisms seen    Body Fluid Culture - Synovial Fluid, Knee, Left [[id number]] Collected: 02/11/25 1745    Lab Status: Preliminary result Specimen: Synovial Fluid from Knee, Left Updated: 02/15/25 0725     Body Fluid Culture No growth at 4 days     Gram Stain Moderate (3+) WBCs seen      No organisms seen    Anaerobic Culture - Synovial Fluid, Knee, Left [[id number]]  (Normal) Collected: 02/11/25 1745    Lab Status: Preliminary result Specimen: Synovial Fluid from Knee, Left Updated: 02/14/25 0802     Anaerobic Culture No anaerobes isolated at 3 days    Urine Culture - Urine, Urine, Clean Catch [[id number]]  (Abnormal)  (Susceptibility) Collected: 02/10/25 0502    Lab Status: Final result Specimen: Urine, Clean Catch Updated: 02/12/25 0903     Urine Culture >100,000 CFU/mL Escherichia coli    Narrative:      Colonization of the urinary tract without infection is common. Treatment is discouraged unless the patient is symptomatic, pregnant, or undergoing an invasive urologic procedure.    Susceptibility        Escherichia coli      TICO      Amoxicillin + Clavulanate Susceptible      Ampicillin Susceptible      Ampicillin + Sulbactam Susceptible      Cefazolin (Urine) Susceptible      Cefepime Susceptible      Ceftazidime Susceptible      Ceftriaxone Susceptible      Gentamicin Susceptible      Levofloxacin Susceptible       Nitrofurantoin Susceptible      Piperacillin + Tazobactam Susceptible      Trimethoprim + Sulfamethoxazole Susceptible                           Blood Culture - Blood, Arm, Left [[id number]]  (Normal) Collected: 02/10/25 0501    Lab Status: Final result Specimen: Blood from Arm, Left Updated: 02/15/25 0515     Blood Culture No growth at 5 days    Blood Culture - Blood, Arm, Left [[id number]]  (Normal) Collected: 02/10/25 0500    Lab Status: Final result Specimen: Blood from Arm, Left Updated: 02/15/25 0515     Blood Culture No growth at 5 days    Respiratory Panel PCR w/COVID-19(SARS-CoV-2) [facility] In-House, NP Swab in UTM/VTM, 2 HR TAT - Swab, Nasopharynx [[id number]]  (Abnormal) Collected: 02/10/25 0401    Lab Status: Final result Specimen: Swab from Nasopharynx Updated: 02/10/25 0824     ADENOVIRUS, PCR Not Detected     Coronavirus 229E Not Detected     Coronavirus HKU1 Not Detected     Coronavirus NL63 Not Detected     Coronavirus OC43 Not Detected     COVID19 Not Detected     Human Metapneumovirus Not Detected     Human Rhinovirus/Enterovirus Not Detected     Influenza A PCR Equivocal     Comment: Appended report. These results have been appended to a previously preliminary verified report.        Influenza B PCR Not Detected     Parainfluenza Virus 1 Not Detected     Parainfluenza Virus 2 Not Detected     Parainfluenza Virus 3 Not Detected     Parainfluenza Virus 4 Not Detected     RSV, PCR Not Detected     Bordetella pertussis pcr Not Detected     Bordetella parapertussis PCR Not Detected     Chlamydophila pneumoniae PCR Not Detected     Mycoplasma pneumo by PCR Not Detected    Narrative:      In the setting of a positive respiratory panel with a viral infection PLUS a negative procalcitonin without other underlying concern for bacterial infection, consider observing off antibiotics or discontinuation of antibiotics and continue supportive care. If the respiratory panel is positive for atypical  bacterial infection (Bordetella pertussis, Chlamydophila pneumoniae, or Mycoplasma pneumoniae), consider antibiotic de-escalation to target atypical bacterial infection.              Assessment:    Bacterial pneumonia    Sepsis, unspecified organism    Influenza A    Acute pain of left knee    Type 2 diabetes mellitus with hyperglycemia, with long-term current use of insulin    CKD (chronic kidney disease) stage 3, GFR 30-59 ml/min    Acute respiratory failure with hypoxia    74-year-old female with  1-painful tender left knee with decline in function status post joint aspiration followed by I&D and washout in the setting of respiratory tract infection with influenza A and abnormal urinalysis consistent with UTI-this presentation of tender painful knee with worsening function and abnormal joint fluid analysis with crystal for uric acid positive could very well be due to combination of multiple pathologies happening together:             -Acute gouty arthritis with             -Secondary septic arthritis due to hematogenous seeding from the lung or urine versus             -Progression of osteoarthritis.  2-acute influenza A with secondary bacterial pneumonia  3-abnormal urinalysis with urine culture without any specific urinary symptoms  4-acute on chronic renal insufficiency with prior history of left-sided hydronephrosis and history of right nephrectomy  5-osteoarthritis of the right shoulder and wrist without any fracture  6-hypertension  7-peripheral neuropathy  8-history of renal cell carcinoma-history of right nephrectomy  9-type 2 diabetes.     Recommendations/Discussions:  See my discussion and recommendations on 2/13/2025 and 2/14/2025.  Management of concomitant gout with short course of oral steroid per primary team and orthopedic surgery service.  Patient will need out of hospital follow-up with orthopedic surgery service as per their recommendations  See discussion in the additional progress note on  2/14/2025 for alternate oral antibiotic therapy as a second line option which is better than taking no specific as she is still very reluctant for IV treatment as recommended below.  Ideal treatment for this presentation is discussed will be 4 weeks of IV Rocephin from last surgical intervention on 2/11/2025.  Following orders are written for case management:   Please arrange for 4 weeks of IV Rocephin at 2 g 24 hours starting 2/11/2025.  Check weekly CBC CMP and CRP and call abnormal results at [phone].  Remove midline/PICC line after completion of antibiotic therapy  Please educate patient to reach out to Dr. Duncan on once a week basis at [phone] to go over review of system to monitor antibiotic toxicity and effectiveness of treatment.  Diagnosis:  1-painful tender left knee with decline in function status post joint aspiration followed by I&D and washout in the setting of respiratory tract infection with influenza A and abnormal urinalysis consistent with UTI-this presentation of tender painful knee with worsening function and abnormal joint fluid analysis with crystal for uric acid positive could very well be due to combination of multiple pathologies happening together:             -Acute gouty arthritis with             -Secondary septic arthritis due to hematogenous seeding from the lung or urine versus             -Progression of osteoarthritis.  2-acute influenza A with secondary bacterial pneumonia  3-abnormal urinalysis with urine culture without any specific urinary symptoms  Roxanne Duncan MD  2/15/2025  08:26 EST    Parts of this note may be an electronic transcription/translation of spoken language to printed text using the Dragon dictation system.

## 2025-02-15 NOTE — PROGRESS NOTES
"RENAL/KCC:     LOS: 5 days     Chief Complaint/ Reason for encounter: CKD management    Subjective   Chart reviewed  No new complaints    Vital Signs  Temp:  [97.7 °F (36.5 °C)-98.2 °F (36.8 °C)] 97.7 °F (36.5 °C)  Heart Rate:  [60-88] 69  Resp:  [16-18] 16  BP: ()/(67-78) 135/70       Wt Readings from Last 1 Encounters:   02/10/25 0330 92.5 kg (204 lb)       Objective:  Vital signs: (most recent): Blood pressure 98/63, pulse 91, temperature 97.7 °F (36.5 °C), temperature source Oral, resp. rate 16, height 170.2 cm (67\"), weight 92.5 kg (204 lb), SpO2 93%.                Objective:  General Appearance:  Comfortable, obese, well-appearing, in no acute distress and not in pain.  HEENT: Mucous membranes moist  Lungs:  Normal effort and normal respiratory rate.  Breath sounds clear to auscultation: No rhonchi/Rales.  No  respiratory distress.   Heart:  S1, S2 normal.  No murmur.   Abdomen: Abdomen is soft, nontender/nondistended, + bowel sounds  Extremities: Trace edema of bilateral lower extremities  Skin:  Warm and dry with no rashes      Results Review:    Intake/Output:     Intake/Output Summary (Last 24 hours) at 2/15/2025 0711  Last data filed at 2/15/2025 0656  Gross per 24 hour   Intake 1090 ml   Output 1850 ml   Net -760 ml         DATA:  Radiology and Labs:  The following labs independently reviewed by me. Additional labs ordered for tomorrow a.m.  Interval notes, chart personally reviewed by me.   Old records independently reviewed showing CKD 3  The following radiologic studies independently viewed by me, findings nothing new  New problems include metabolic acidosis  Discussed with patient    Risk/ complexity of medical care/ medical decision making moderate, diuretic management with CKD    Labs:   Recent Results (from the past 24 hours)   POC Glucose Once    Collection Time: 02/14/25  7:24 AM    Specimen: Blood   Result Value Ref Range    Glucose 124 70 - 130 mg/dL   POC Glucose Once    Collection " Time: 02/14/25 11:32 AM    Specimen: Blood   Result Value Ref Range    Glucose 267 (H) 70 - 130 mg/dL   POC Glucose Once    Collection Time: 02/14/25  4:29 PM    Specimen: Blood   Result Value Ref Range    Glucose 230 (H) 70 - 130 mg/dL   POC Glucose Once    Collection Time: 02/14/25  8:42 PM    Specimen: Blood   Result Value Ref Range    Glucose 138 (H) 70 - 130 mg/dL   AFP Tumor Marker    Collection Time: 02/15/25  5:30 AM    Specimen: Blood   Result Value Ref Range    ALPHA-FETOPROTEIN 3.19 0 - 8.3 ng/mL   Protime-INR    Collection Time: 02/15/25  5:30 AM    Specimen: Blood   Result Value Ref Range    Protime 15.1 (H) 11.7 - 14.2 Seconds    INR 1.16 (H) 0.90 - 1.10   Renal Function Panel    Collection Time: 02/15/25  5:31 AM    Specimen: Blood   Result Value Ref Range    Glucose 137 (H) 65 - 99 mg/dL    BUN 55 (H) 8 - 23 mg/dL    Creatinine 1.15 (H) 0.57 - 1.00 mg/dL    Sodium 137 136 - 145 mmol/L    Potassium 4.3 3.5 - 5.2 mmol/L    Chloride 102 98 - 107 mmol/L    CO2 22.0 22.0 - 29.0 mmol/L    Calcium 8.6 8.6 - 10.5 mg/dL    Albumin 2.9 (L) 3.5 - 5.2 g/dL    Phosphorus 4.1 2.5 - 4.5 mg/dL    Anion Gap 13.0 5.0 - 15.0 mmol/L    BUN/Creatinine Ratio 47.8 (H) 7.0 - 25.0    eGFR 50.1 (L) >60.0 mL/min/1.73   Acetaminophen Level    Collection Time: 02/15/25  5:31 AM    Specimen: Blood   Result Value Ref Range    Acetaminophen <5.0 0.0 - 30.0 mcg/mL   Gamma GT    Collection Time: 02/15/25  5:31 AM    Specimen: Blood   Result Value Ref Range    GGT 49 (H) 5 - 36 U/L   Hepatitis Panel, Acute    Collection Time: 02/15/25  5:31 AM    Specimen: Blood   Result Value Ref Range    Hepatitis B Surface Ag Non-Reactive Non-Reactive    Hep A IgM Non-Reactive Non-Reactive    Hep B C IgM Non-Reactive Non-Reactive    Hepatitis C Ab Non-Reactive Non-Reactive   IgG, IgA, IgM    Collection Time: 02/15/25  5:31 AM    Specimen: Blood   Result Value Ref Range    IgG 1,150 700 - 1,600 mg/dL    IgM 177 40 - 230 mg/dL    IgA 249 70 - 400  mg/dL   CBC Auto Differential    Collection Time: 02/15/25  5:31 AM    Specimen: Blood   Result Value Ref Range    WBC 10.16 3.40 - 10.80 10*3/mm3    RBC 3.33 (L) 3.77 - 5.28 10*6/mm3    Hemoglobin 10.0 (L) 12.0 - 15.9 g/dL    Hematocrit 29.8 (L) 34.0 - 46.6 %    MCV 89.5 79.0 - 97.0 fL    MCH 30.0 26.6 - 33.0 pg    MCHC 33.6 31.5 - 35.7 g/dL    RDW 12.4 12.3 - 15.4 %    RDW-SD 40.5 37.0 - 54.0 fl    MPV 9.2 6.0 - 12.0 fL    Platelets 485 (H) 140 - 450 10*3/mm3    Neutrophil % 77.3 (H) 42.7 - 76.0 %    Lymphocyte % 14.4 (L) 19.6 - 45.3 %    Monocyte % 7.0 5.0 - 12.0 %    Eosinophil % 0.2 (L) 0.3 - 6.2 %    Basophil % 0.1 0.0 - 1.5 %    Immature Grans % 1.0 (H) 0.0 - 0.5 %    Neutrophils, Absolute 7.86 (H) 1.70 - 7.00 10*3/mm3    Lymphocytes, Absolute 1.46 0.70 - 3.10 10*3/mm3    Monocytes, Absolute 0.71 0.10 - 0.90 10*3/mm3    Eosinophils, Absolute 0.02 0.00 - 0.40 10*3/mm3    Basophils, Absolute 0.01 0.00 - 0.20 10*3/mm3    Immature Grans, Absolute 0.10 (H) 0.00 - 0.05 10*3/mm3    nRBC 0.0 0.0 - 0.2 /100 WBC       Radiology:  Pertinent radiology studies were reviewed as described above      Medications have been reviewed separately in chart review medication tab      ASSESSMENT:  CKD stage IIIa, stable, near baseline    Metabolic acidosis    Bacterial pneumonia, on IV/p.o. antibiotics    Sepsis, unspecified organism    Influenza A  Chronic diastolic CHF, back on oral diuretics today    Acute pain of left knee    Type 2 diabetes mellitus with hyperglycemia, with long-term current use of insulin    CKD (chronic kidney disease) stage 3, GFR 30-59 ml/min    Acute respiratory failure with hypoxia           DISCUSSION/PLAN:   Creatinine level today better than baseline creatinine of around 1.4-1.8  Clinically euvolemic on exam  Oral Lasix has been restarted, volume status stable  Currently no plans for CT with IV contrast  Antibiotics per infectious disease, dosed for GFR around 40  Her potassium has been stable so we  will hold Lokelma for now  Continue Jardiance  Continue sodium bicarb at 3 times daily dosing  Follow-up a.m. labs    Continue to monitor electrolytes and volume closely, avoid IV contrast and nephrotoxic medications        Justice Grayson MD  [facility]   Office phone number: [phone]  Answering service phone number: [phone]    02/15/25  07:11 EST

## 2025-02-15 NOTE — THERAPY TREATMENT NOTE
Patient Name: Chrissy Gutierrez  : 1950    MRN: [id number]                              Today's Date: 2/15/2025       Admit Date: 2/10/2025    Visit Dx:     ICD-10-CM ICD-9-CM   1. Acute respiratory failure with hypoxia  J96.01 518.81   2. Pneumonia of right lower lobe due to infectious organism  J18.9 486   3. Acute pain of both knees  M25.561 338.19    M25.562 719.46   4. Generalized weakness  R53.1 780.79   5. SONI (acute kidney injury)  N17.9 584.9   6. Acute UTI  N39.0 599.0   7. Septic arthritis of knee, left  M00.9 711.06   8. Follow-up exam  Z09 V67.9     Patient Active Problem List   Diagnosis    Right lower lobe pneumonia    Sepsis, unspecified organism    Influenza A    Acute pain of left knee    Bacterial pneumonia    Type 2 diabetes mellitus with hyperglycemia, with long-term current use of insulin    CKD (chronic kidney disease) stage 3, GFR 30-59 ml/min    Acute respiratory failure with hypoxia     Past Medical History:   Diagnosis Date    Cancer     right kidney    Chronic kidney disease     Hypertension     Low back pain     Osteoarthritis     Peripheral neuropathy      Past Surgical History:   Procedure Laterality Date    BACK SURGERY      EPIDURAL BLOCK      KIDNEY SURGERY Right 2017    REMOVED RIGHT KIDNEY    KNEE INCISION AND DRAINAGE Left 2025    Procedure: KNEE INCISION AND DRAINAGE;  Surgeon: Arvind Ibarra MD;  Location: [facility];  Service: Orthopedics;  Laterality: Left;    LUMBAR DISCECTOMY FUSION INSTRUMENTATION N/A 2017    Procedure: 1 LEVEL LAMINECTOMY DECOMPRESSION L4 5 EXCISION FACET CYST;  Surgeon: Negrito Oconnell DO;  Location: [facility];  Service:     TUBAL ABDOMINAL LIGATION      WISDOM TOOTH EXTRACTION        General Information       Row Name 02/15/25 1633          Physical Therapy Time and Intention    Document Type therapy note (daily note)  -MS     Mode of Treatment physical therapy;individual therapy  -MS       Row Name 02/15/25 6604           General Information    Patient Profile Reviewed yes  -MS     Existing Precautions/Restrictions fall   Exit alarm  -MS     Barriers to Rehab none identified  -MS       Row Name 02/15/25 1633          Cognition    Orientation Status (Cognition) oriented x 3  -MS       Row Name 02/15/25 1633          Safety Issues/Impairments Affecting Functional Mobility    Comment, Safety Issues/Impairments (Mobility) Gait belt used for safety.  -MS               User Key  (r) = Recorded By, (t) = Taken By, (c) = Cosigned By      Initials Name Provider Type    Jed Savage, PT Physical Therapist                   Mobility       Row Name 02/15/25 1634          Bed Mobility    Supine-Sit Yantis (Bed Mobility) contact guard  -MS       Row Name 02/15/25 1634          Sit-Stand Transfer    Sit-Stand Yantis (Transfers) minimum assist (75% patient effort)  -MS     Assistive Device (Sit-Stand Transfers) walker, front-wheeled  -MS       Row Name 02/15/25 1634          Gait/Stairs (Locomotion)    Y[place] Level (Gait) minimum assist (75% patient effort)  -MS     Assistive Device (Gait) walker, front-wheeled  -MS     Distance in Feet (Gait) 8  -MS     Deviations/Abnormal Patterns (Gait) trinh decreased;antalgic  -MS     Bilateral Gait Deviations forward flexed posture  -MS     Comment, (Gait/Stairs) Verbal/tactile cues given for posture correction and Rwx guidance.  -MS       Row Name 02/15/25 1634          Mobility    Extremity Weight-bearing Status right lower extremity  -MS     Right Lower Extremity (Weight-bearing Status) weight-bearing as tolerated (WBAT)  -MS               User Key  (r) = Recorded By, (t) = Taken By, (c) = Cosigned By      Initials Name Provider Type    Jed Savage, PT Physical Therapist                   Obj/Interventions    No documentation.                  Goals/Plan    No documentation.                  Clinical Impression       Row Name 02/15/25 1634          Pain     Pretreatment Pain Rating 8/10  -MS     Posttreatment Pain Rating 8/10  -MS     Pain Location knee  -MS     Pain Side/Orientation right  -MS     Pain Management Interventions nursing notified;premedicated for activity;positioning techniques utilized  -MS       Row Name 02/15/25 1634          Plan of Care Review    Plan of Care Reviewed With patient  -MS       Row Name 02/15/25 1634          Positioning and Restraints    Pre-Treatment Position in bed  -MS     Post Treatment Position chair  -MS     In Chair notified nsg;reclined;sitting;call light within reach;encouraged to call for assist;exit alarm on  Ice pack to Right knee  -MS               User Key  (r) = Recorded By, (t) = Taken By, (c) = Cosigned By      Initials Name Provider Type    Jed Savage, PT Physical Therapist                   Outcome Measures       Row Name 02/15/25 1635 02/15/25 0819       How much help from another person do you currently need...    Turning from your back to your side while in flat bed without using bedrails? 3  -MS 4  -MR    Moving from lying on back to sitting on the side of a flat bed without bedrails? 3  -MS 3  -MR    Moving to and from a bed to a chair (including a wheelchair)? 3  -MS 2  -MR    Standing up from a chair using your arms (e.g., wheelchair, bedside chair)? 3  -MS 2  -MR    Climbing 3-5 steps with a railing? 2  -MS 1  -MR    To walk in hospital room? 3  -MS 2  -MR    AM-PAC 6 Clicks Score (PT) 17  -MS 14  -MR    Highest Level of Mobility Goal 5 --> Static standing  -MS 4 --> Transfer to chair/commode  -MR      Row Name 02/15/25 1635          Functional Assessment    Outcome Measure Options AM-PAC 6 Clicks Basic Mobility (PT)  -MS               User Key  (r) = Recorded By, (t) = Taken By, (c) = Cosigned By      Initials Name Provider Type    Jed Savage, PT Physical Therapist    Aline Mccann, RN Registered Nurse                                 Physical Therapy Education       Title: PT OT SLP  Therapies (In Progress)       Topic: Physical Therapy (In Progress)       Point: Mobility training (Done)       Learning Progress Summary            Patient Acceptance, E,D, VU,NR by MS at 2/15/2025 1635    Acceptance, E, NR by  at 2/11/2025 1323                      Point: Home exercise program (In Progress)       Learning Progress Summary            Patient Acceptance, E, NR by CS at 2/11/2025 1323                                      User Key       Initials Effective Dates Name Provider Type Discipline    MS 06/16/21 -  Jed Mark, PT Physical Therapist PT    CS 09/06/24 -  Shen Gutierrez PT Physical Therapist PT                  PT Recommendation and Plan     Outcome Evaluation: Upon entering room, pt. supine in bed, awake/alert, and agreeable to work with P.T. this date despite c/o Right knee pain (8/10). This PM, pt. able to ambulate 8 feet, Min. assist x 1, with use of Rwx.  Pt. requires CGA x 1 for bed mobility and Min. assist x 1 for sit <-> stand transfers.  Verbal/tactile cues given during ambulation for posture correction and Rwx guidance.  Will continue to progress functional mobility as tolerated.     Time Calculation:         PT Charges       Row Name 02/15/25 1638             Time Calculation    Start Time 1520  -MS      Stop Time 1535  -MS      Time Calculation (min) 15 min  -MS      PT Received On 02/15/25  -MS      PT - Next Appointment 02/17/25  -MS         Time Calculation- PT    Total Timed Code Minutes- PT 14 minute(s)  -MS                User Key  (r) = Recorded By, (t) = Taken By, (c) = Cosigned By      Initials Name Provider Type    MS Jed Mark, PT Physical Therapist                  Therapy Charges for Today       Code Description Service Date Service Provider Modifiers Qty    36390786944  PT THERAPEUTIC ACT EA 15 MIN 2/15/2025 Jed Mark, PT GP 1            PT G-Codes  Outcome Measure Options: AM-PAC 6 Clicks Basic Mobility (PT)  AM-PAC 6 Clicks Score (PT): 17        Jed Mark, PT  2/15/2025

## 2025-02-16 LAB
ALBUMIN SERPL-MCNC: 2.9 G/DL (ref 3.5–5.2)
ALBUMIN/GLOB SERPL: 0.7 G/DL
ALP SERPL-CCNC: 98 U/L (ref 39–117)
ALT SERPL W P-5'-P-CCNC: 128 U/L (ref 1–33)
ANION GAP SERPL CALCULATED.3IONS-SCNC: 14.1 MMOL/L (ref 5–15)
AST SERPL-CCNC: 37 U/L (ref 1–32)
BACTERIA FLD CULT: NORMAL
BACTERIA SPEC ANAEROBE CULT: NORMAL
BASOPHILS # BLD AUTO: 0.01 10*3/MM3 (ref 0–0.2)
BASOPHILS NFR BLD AUTO: 0.1 % (ref 0–1.5)
BILIRUB SERPL-MCNC: <0.2 MG/DL (ref 0–1.2)
BUN SERPL-MCNC: 66 MG/DL (ref 8–23)
BUN/CREAT SERPL: 44.3 (ref 7–25)
CALCIUM SPEC-SCNC: 8.7 MG/DL (ref 8.6–10.5)
CHLORIDE SERPL-SCNC: 102 MMOL/L (ref 98–107)
CMV IGG SERPL IA-ACNC: <0.6 U/ML (ref 0–0.59)
CMV IGM SERPL IA-ACNC: <30 AU/ML (ref 0–29.9)
CO2 SERPL-SCNC: 22.9 MMOL/L (ref 22–29)
CREAT SERPL-MCNC: 1.49 MG/DL (ref 0.57–1)
DEPRECATED RDW RBC AUTO: 40.2 FL (ref 37–54)
EGFRCR SERPLBLD CKD-EPI 2021: 36.7 ML/MIN/1.73
EOSINOPHIL # BLD AUTO: 0 10*3/MM3 (ref 0–0.4)
EOSINOPHIL NFR BLD AUTO: 0 % (ref 0.3–6.2)
ERYTHROCYTE [DISTWIDTH] IN BLOOD BY AUTOMATED COUNT: 12.2 % (ref 12.3–15.4)
GLOBULIN UR ELPH-MCNC: 4.4 GM/DL
GLUCOSE BLDC GLUCOMTR-MCNC: 202 MG/DL (ref 70–130)
GLUCOSE BLDC GLUCOMTR-MCNC: 263 MG/DL (ref 70–130)
GLUCOSE BLDC GLUCOMTR-MCNC: 279 MG/DL (ref 70–130)
GLUCOSE BLDC GLUCOMTR-MCNC: 310 MG/DL (ref 70–130)
GLUCOSE BLDC GLUCOMTR-MCNC: 389 MG/DL (ref 70–130)
GLUCOSE SERPL-MCNC: 240 MG/DL (ref 65–99)
GRAM STN SPEC: NORMAL
GRAM STN SPEC: NORMAL
HCT VFR BLD AUTO: 31.8 % (ref 34–46.6)
HGB BLD-MCNC: 10.6 G/DL (ref 12–15.9)
IMM GRANULOCYTES # BLD AUTO: 0.15 10*3/MM3 (ref 0–0.05)
IMM GRANULOCYTES NFR BLD AUTO: 1.3 % (ref 0–0.5)
LYMPHOCYTES # BLD AUTO: 0.94 10*3/MM3 (ref 0.7–3.1)
LYMPHOCYTES NFR BLD AUTO: 8 % (ref 19.6–45.3)
MCH RBC QN AUTO: 29.9 PG (ref 26.6–33)
MCHC RBC AUTO-ENTMCNC: 33.3 G/DL (ref 31.5–35.7)
MCV RBC AUTO: 89.6 FL (ref 79–97)
MONOCYTES # BLD AUTO: 0.62 10*3/MM3 (ref 0.1–0.9)
MONOCYTES NFR BLD AUTO: 5.3 % (ref 5–12)
NEUTROPHILS NFR BLD AUTO: 85.3 % (ref 42.7–76)
NEUTROPHILS NFR BLD AUTO: 9.98 10*3/MM3 (ref 1.7–7)
NRBC BLD AUTO-RTO: 0.2 /100 WBC (ref 0–0.2)
PHOSPHATE SERPL-MCNC: 4.3 MG/DL (ref 2.5–4.5)
PLATELET # BLD AUTO: 483 10*3/MM3 (ref 140–450)
PMV BLD AUTO: 8.9 FL (ref 6–12)
POTASSIUM SERPL-SCNC: 4.8 MMOL/L (ref 3.5–5.2)
PROT SERPL-MCNC: 7.3 G/DL (ref 6–8.5)
RBC # BLD AUTO: 3.55 10*6/MM3 (ref 3.77–5.28)
SODIUM SERPL-SCNC: 139 MMOL/L (ref 136–145)
WBC NRBC COR # BLD AUTO: 11.7 10*3/MM3 (ref 3.4–10.8)

## 2025-02-16 PROCEDURE — 25010000002 CEFTRIAXONE PER 250 MG: Performed by: INTERNAL MEDICINE

## 2025-02-16 PROCEDURE — 82948 REAGENT STRIP/BLOOD GLUCOSE: CPT

## 2025-02-16 PROCEDURE — 63710000001 PREDNISONE PER 1 MG: Performed by: INTERNAL MEDICINE

## 2025-02-16 PROCEDURE — 63710000001 INSULIN LISPRO (HUMAN) PER 5 UNITS: Performed by: ORTHOPAEDIC SURGERY

## 2025-02-16 PROCEDURE — 94799 UNLISTED PULMONARY SVC/PX: CPT

## 2025-02-16 PROCEDURE — 80053 COMPREHEN METABOLIC PANEL: CPT | Performed by: INTERNAL MEDICINE

## 2025-02-16 PROCEDURE — 94761 N-INVAS EAR/PLS OXIMETRY MLT: CPT

## 2025-02-16 PROCEDURE — 25010000002 ENOXAPARIN PER 10 MG: Performed by: ORTHOPAEDIC SURGERY

## 2025-02-16 PROCEDURE — 63710000001 INSULIN GLARGINE PER 5 UNITS: Performed by: ORTHOPAEDIC SURGERY

## 2025-02-16 PROCEDURE — 94760 N-INVAS EAR/PLS OXIMETRY 1: CPT

## 2025-02-16 PROCEDURE — 94664 DEMO&/EVAL PT USE INHALER: CPT

## 2025-02-16 PROCEDURE — 85025 COMPLETE CBC W/AUTO DIFF WBC: CPT | Performed by: INTERNAL MEDICINE

## 2025-02-16 PROCEDURE — 84100 ASSAY OF PHOSPHORUS: CPT | Performed by: INTERNAL MEDICINE

## 2025-02-16 RX ADMIN — Medication 10 ML: at 09:23

## 2025-02-16 RX ADMIN — DOXYCYCLINE 100 MG: 100 CAPSULE ORAL at 08:01

## 2025-02-16 RX ADMIN — INSULIN LISPRO 6 UNITS: 100 INJECTION, SOLUTION INTRAVENOUS; SUBCUTANEOUS at 11:37

## 2025-02-16 RX ADMIN — SODIUM BICARBONATE 650 MG TABLET 650 MG: at 08:01

## 2025-02-16 RX ADMIN — SODIUM BICARBONATE 650 MG TABLET 650 MG: at 20:21

## 2025-02-16 RX ADMIN — FUROSEMIDE 20 MG: 20 TABLET ORAL at 08:01

## 2025-02-16 RX ADMIN — INSULIN LISPRO 7 UNITS: 100 INJECTION, SOLUTION INTRAVENOUS; SUBCUTANEOUS at 17:22

## 2025-02-16 RX ADMIN — Medication 10 ML: at 20:22

## 2025-02-16 RX ADMIN — INSULIN LISPRO 4 UNITS: 100 INJECTION, SOLUTION INTRAVENOUS; SUBCUTANEOUS at 07:59

## 2025-02-16 RX ADMIN — INSULIN LISPRO 8 UNITS: 100 INJECTION, SOLUTION INTRAVENOUS; SUBCUTANEOUS at 20:22

## 2025-02-16 RX ADMIN — DOXYCYCLINE 100 MG: 100 CAPSULE ORAL at 20:21

## 2025-02-16 RX ADMIN — OXYCODONE HYDROCHLORIDE 5 MG: 5 TABLET ORAL at 19:08

## 2025-02-16 RX ADMIN — EMPAGLIFLOZIN 25 MG: 25 TABLET, FILM COATED ORAL at 08:01

## 2025-02-16 RX ADMIN — IPRATROPIUM BROMIDE AND ALBUTEROL SULFATE 3 ML: 2.5; .5 SOLUTION RESPIRATORY (INHALATION) at 14:10

## 2025-02-16 RX ADMIN — ASPIRIN 81 MG: 81 TABLET, COATED ORAL at 08:01

## 2025-02-16 RX ADMIN — CARVEDILOL 12.5 MG: 12.5 TABLET, FILM COATED ORAL at 17:23

## 2025-02-16 RX ADMIN — OXYCODONE HYDROCHLORIDE 5 MG: 5 TABLET ORAL at 08:01

## 2025-02-16 RX ADMIN — PREDNISONE 40 MG: 20 TABLET ORAL at 08:01

## 2025-02-16 RX ADMIN — IPRATROPIUM BROMIDE AND ALBUTEROL SULFATE 3 ML: 2.5; .5 SOLUTION RESPIRATORY (INHALATION) at 07:10

## 2025-02-16 RX ADMIN — INSULIN GLARGINE 10 UNITS: 100 INJECTION, SOLUTION SUBCUTANEOUS at 08:01

## 2025-02-16 RX ADMIN — CARVEDILOL 12.5 MG: 12.5 TABLET, FILM COATED ORAL at 08:01

## 2025-02-16 RX ADMIN — CEFTRIAXONE 2000 MG: 2 INJECTION, POWDER, FOR SOLUTION INTRAMUSCULAR; INTRAVENOUS at 09:22

## 2025-02-16 RX ADMIN — IPRATROPIUM BROMIDE AND ALBUTEROL SULFATE 3 ML: 2.5; .5 SOLUTION RESPIRATORY (INHALATION) at 20:43

## 2025-02-16 RX ADMIN — SODIUM BICARBONATE 650 MG TABLET 650 MG: at 17:22

## 2025-02-16 RX ADMIN — GLIPIZIDE 5 MG: 5 TABLET ORAL at 08:02

## 2025-02-16 RX ADMIN — ENOXAPARIN SODIUM 40 MG: 100 INJECTION SUBCUTANEOUS at 14:50

## 2025-02-16 NOTE — PROGRESS NOTES
Name: Chrissy Gutierrez ADMIT: 2/10/2025   : 1950  PCP: Jai Steven MD    MRN: [id number] LOS: 6 days   AGE/SEX: 74 y.o. female  ROOM: [facility]     Subjective   Subjective   Patient seen at bedside, patient is lying in bed.       Objective   Objective   Vital Signs  Temp:  [97 °F (36.1 °C)-98.8 °F (37.1 °C)] 97 °F (36.1 °C)  Heart Rate:  [74-96] 74  Resp:  [16-18] 18  BP: (112-133)/(70-74) 123/74  SpO2:  [90 %-96 %] 93 %  on  Flow (L/min) (Oxygen Therapy):  [2-3] 3;   Device (Oxygen Therapy): nasal cannula  Body mass index is 31.95 kg/m².  Physical Exam  General, awake and alert.  Head and ENT, normocephalic and atraumatic.  Lungs, symmetric expansion, equal air entry bilaterally.  Heart, regular rate and rhythm.  Abdomen, soft and nontender.  Extremities, no clubbing or cyanosis.  Neuro, no focal deficits.  Skin: Warm and no rash.  Psych, normal mood and affect.  Musculoskeletal, joint examination is grossly normal.     Copied text material from yesterday's note has been reviewed for appropriate changes and remains accurate as of 25.        Results Review     I reviewed the patient's new clinical results.  Results from last 7 days   Lab Units 02/16/25  0417 02/15/25  0531 02/14/25  0427 02/13/25  0416   WBC 10*3/mm3 11.70* 10.16 15.00* 17.05*   HEMOGLOBIN g/dL 10.6* 10.0* 9.7* 9.9*   PLATELETS 10*3/mm3 483* 485* 430 395     Results from last 7 days   Lab Units 02/16/25  0417 02/15/25  0531 02/14/25  0427 02/13/25  0416   SODIUM mmol/L 139 137 137 139   POTASSIUM mmol/L 4.8 4.3 4.3 4.3   CHLORIDE mmol/L 102 102 104 104   CO2 mmol/L 22.9 22.0 19.0* 18.0*   BUN mg/dL 66* 55* 49* 37*   CREATININE mg/dL 1.49* 1.15* 1.43* 1.40*   GLUCOSE mg/dL 240* 137* 136* 202*   EGFR mL/min/1.73 36.7* 50.1* 38.6* 39.6*     Results from last 7 days   Lab Units 25  0417 02/15/25  0531 25  0427 25  0416 25  0618   ALBUMIN g/dL 2.9* 2.9* 2.6* 2.7* 2.8*   BILIRUBIN mg/dL <0.2  --  0.2 0.2 0.3    ALK PHOS U/L 98  --  111 100 80   AST (SGOT) U/L 37*  --  535* 870* 209*   ALT (SGPT) U/L 128*  --  331* 466* 169*     Results from last 7 days   Lab Units 02/16/25  0417 02/15/25  0531 02/14/25  0427 02/13/25  0416   CALCIUM mg/dL 8.7 8.6 8.8 9.0   ALBUMIN g/dL 2.9* 2.9* 2.6* 2.7*   PHOSPHORUS mg/dL 4.3 4.1  --   --      Results from last 7 days   Lab Units 02/10/25  0357   PROCALCITONIN ng/mL 0.95*   LACTATE mmol/L 1.2     Glucose   Date/Time Value Ref Range Status   02/16/2025 1620 310 (H) 70 - 130 mg/dL Final   02/16/2025 1100 263 (H) 70 - 130 mg/dL Final   02/16/2025 0714 202 (H) 70 - 130 mg/dL Final   02/16/2025 0158 279 (H) 70 - 130 mg/dL Final   02/15/2025 2258 420 (C) 70 - 130 mg/dL Final   02/15/2025 2040 479 (C) 70 - 130 mg/dL Final   02/15/2025 1650 353 (H) 70 - 130 mg/dL Final       US Liver    Result Date: 2/14/2025  Unremarkable sonographic appearance of the liver and gallbladder/biliary tree.    This report was finalized on 2/14/2025 7:59 PM by Dr. Jed Barahona M.D on Workstation: [id number]       I have personally reviewed all medications:  Scheduled Medications  aspirin, 81 mg, Oral, Daily  carvedilol, 12.5 mg, Oral, BID With Meals  cefTRIAXone, 2,000 mg, Intravenous, Q24H  doxycycline, 100 mg, Oral, Q12H  empagliflozin, 25 mg, Oral, Daily  enoxaparin, 40 mg, Subcutaneous, Q24H  furosemide, 20 mg, Oral, Daily  glipizide, 5 mg, Oral, QAM AC  insulin glargine, 10 Units, Subcutaneous, Daily  insulin lispro, 2-9 Units, Subcutaneous, 4x Daily AC & at Bedtime  ipratropium-albuterol, 3 mL, Nebulization, Q6H While Awake - RT  predniSONE, 40 mg, Oral, Daily With Breakfast  sodium bicarbonate, 650 mg, Oral, TID  sodium chloride, 10 mL, Intravenous, Q12H    Infusions   Diet  Diet: Diabetic; Consistent Carbohydrate; Fluid Consistency: Thin (IDDSI 0)    I have personally reviewed:  [x]  Laboratory   [x]  Microbiology   [x]  Radiology   [x]  EKG/Telemetry  [x]  Cardiology/Vascular   []  Pathology    []   Records       Assessment/Plan     Active Hospital Problems    Diagnosis  POA    **Bacterial pneumonia [J15.9]  Yes    Sepsis, unspecified organism [A41.9]  Yes    Influenza A [J10.1]  Yes    Acute pain of left knee [M25.562]  Yes    Type 2 diabetes mellitus with hyperglycemia, with long-term current use of insulin [E11.65, Z79.4]  Not Applicable    CKD (chronic kidney disease) stage 3, GFR 30-59 ml/min [N18.30]  Yes    Acute respiratory failure with hypoxia [J96.01]  Yes      Resolved Hospital Problems   No resolved problems to display.       74 y.o. female admitted with Bacterial pneumonia.    Assessment and plan  1.  Acute respiratory failure with hypoxia, bacterial pneumonia, recent history of flu infection, superimposed bacterial pneumonia, treated with antibiotics, continue Rocephin, added doxycycline for atypical coverage.     2.  Acute pain in the left knee, status post orthopedics, suspected septic arthritis, S/P OR for debridement and washout.  Case discussed with infectious disease, based on patient's clinical presentation, it would be reasonable to complete 4 weeks of IV antibiotics presuming it is septic arthritis.  Patient reluctant to go on IV antibiotics, alternate plan for oral antibiotics have been discussed with ID.     3.  Acute on chronic kidney injury, renal function has improved, appears to be close to baseline.  Avoid nephrotoxic medications.  Nephrology on board and following.     4.  Diabetes mellitus, continue Accu-Cheks and sliding scale insulin coverage.     5.  Transaminitis, etiology unclear, consulted gastroenterology, appreciate input.     6.  CODE STATUS is full code.  Further plans based on hospital course.    Expected Discharge Date: 2/17/2025; Expected Discharge Time:       Raleigh Boateng MD  [facility]  02/16/25  16:48 EST

## 2025-02-16 NOTE — PROGRESS NOTES
"RENAL/KCC:     LOS: 6 days     Chief Complaint/ Reason for encounter: CKD management    Subjective   Chart reviewed  No new complaints    Vital Signs  Temp:  [97.4 °F (36.3 °C)-98.8 °F (37.1 °C)] 98.8 °F (37.1 °C)  Heart Rate:  [82-96] 88  Resp:  [16-18] 16  BP: ()/(63-73) 133/73       Wt Readings from Last 1 Encounters:   02/10/25 0330 92.5 kg (204 lb)       Objective:  Vital signs: (most recent): Blood pressure 133/73, pulse 81, temperature 98.8 °F (37.1 °C), temperature source Oral, resp. rate 18, height 170.2 cm (67\"), weight 92.5 kg (204 lb), SpO2 90%.                Objective:  General Appearance:  Comfortable, obese, well-appearing, in no acute distress and not in pain.  HEENT: Mucous membranes moist  Lungs:  Normal effort and normal respiratory rate.  Breath sounds clear to auscultation: No rhonchi/Rales.  No  respiratory distress.   Heart:  S1, S2 normal.  No murmur.   Abdomen: Abdomen is soft, nontender/nondistended, + bowel sounds  Extremities: Trace edema of bilateral lower extremities  Skin:  Warm and dry with no rashes      Results Review:    Intake/Output:     Intake/Output Summary (Last 24 hours) at 2/16/2025 0706  Last data filed at 2/16/2025 0610  Gross per 24 hour   Intake 780 ml   Output 3800 ml   Net -3020 ml         DATA:  Radiology and Labs:  The following labs independently reviewed by me. Additional labs ordered for tomorrow a.m.  Interval notes, chart personally reviewed by me.   Old records independently reviewed showing CKD 3  The following radiologic studies independently viewed by me, findings nothing new  New problems include metabolic acidosis  Discussed with patient    Risk/ complexity of medical care/ medical decision making moderate, diuretic management with CKD    Labs:   Recent Results (from the past 24 hours)   POC Glucose Once    Collection Time: 02/15/25 11:24 AM    Specimen: Blood   Result Value Ref Range    Glucose 234 (H) 70 - 130 mg/dL   POC Glucose Once    " Collection Time: 02/15/25  4:50 PM    Specimen: Blood   Result Value Ref Range    Glucose 353 (H) 70 - 130 mg/dL   POC Glucose Once    Collection Time: 02/15/25  8:40 PM    Specimen: Blood   Result Value Ref Range    Glucose 479 (C) 70 - 130 mg/dL   POC Glucose Once    Collection Time: 02/15/25 10:58 PM    Specimen: Blood   Result Value Ref Range    Glucose 420 (C) 70 - 130 mg/dL   POC Glucose Once    Collection Time: 02/16/25  1:58 AM    Specimen: Blood   Result Value Ref Range    Glucose 279 (H) 70 - 130 mg/dL   Comprehensive Metabolic Panel    Collection Time: 02/16/25  4:17 AM    Specimen: Blood   Result Value Ref Range    Glucose 240 (H) 65 - 99 mg/dL    BUN 66 (H) 8 - 23 mg/dL    Creatinine 1.49 (H) 0.57 - 1.00 mg/dL    Sodium 139 136 - 145 mmol/L    Potassium 4.8 3.5 - 5.2 mmol/L    Chloride 102 98 - 107 mmol/L    CO2 22.9 22.0 - 29.0 mmol/L    Calcium 8.7 8.6 - 10.5 mg/dL    Total Protein 7.3 6.0 - 8.5 g/dL    Albumin 2.9 (L) 3.5 - 5.2 g/dL    ALT (SGPT) 128 (H) 1 - 33 U/L    AST (SGOT) 37 (H) 1 - 32 U/L    Alkaline Phosphatase 98 39 - 117 U/L    Total Bilirubin <0.2 0.0 - 1.2 mg/dL    Globulin 4.4 gm/dL    A/G Ratio 0.7 g/dL    BUN/Creatinine Ratio 44.3 (H) 7.0 - 25.0    Anion Gap 14.1 5.0 - 15.0 mmol/L    eGFR 36.7 (L) >60.0 mL/min/1.73   CBC Auto Differential    Collection Time: 02/16/25  4:17 AM    Specimen: Blood   Result Value Ref Range    WBC 11.70 (H) 3.40 - 10.80 10*3/mm3    RBC 3.55 (L) 3.77 - 5.28 10*6/mm3    Hemoglobin 10.6 (L) 12.0 - 15.9 g/dL    Hematocrit 31.8 (L) 34.0 - 46.6 %    MCV 89.6 79.0 - 97.0 fL    MCH 29.9 26.6 - 33.0 pg    MCHC 33.3 31.5 - 35.7 g/dL    RDW 12.2 (L) 12.3 - 15.4 %    RDW-SD 40.2 37.0 - 54.0 fl    MPV 8.9 6.0 - 12.0 fL    Platelets 483 (H) 140 - 450 10*3/mm3    Neutrophil % 85.3 (H) 42.7 - 76.0 %    Lymphocyte % 8.0 (L) 19.6 - 45.3 %    Monocyte % 5.3 5.0 - 12.0 %    Eosinophil % 0.0 (L) 0.3 - 6.2 %    Basophil % 0.1 0.0 - 1.5 %    Immature Grans % 1.3 (H) 0.0 - 0.5 %     Neutrophils, Absolute 9.98 (H) 1.70 - 7.00 10*3/mm3    Lymphocytes, Absolute 0.94 0.70 - 3.10 10*3/mm3    Monocytes, Absolute 0.62 0.10 - 0.90 10*3/mm3    Eosinophils, Absolute 0.00 0.00 - 0.40 10*3/mm3    Basophils, Absolute 0.01 0.00 - 0.20 10*3/mm3    Immature Grans, Absolute 0.15 (H) 0.00 - 0.05 10*3/mm3    nRBC 0.2 0.0 - 0.2 /100 WBC   Phosphorus    Collection Time: 02/16/25  4:17 AM    Specimen: Blood   Result Value Ref Range    Phosphorus 4.3 2.5 - 4.5 mg/dL       Radiology:  Pertinent radiology studies were reviewed as described above      Medications have been reviewed separately in chart review medication tab      ASSESSMENT:  CKD stage IIIa, stable, near baseline    Metabolic acidosis    Bacterial pneumonia, on IV/p.o. antibiotics    Sepsis, unspecified organism    Influenza A  Chronic diastolic CHF, back on oral diuretics today    Acute pain of left knee    Type 2 diabetes mellitus with hyperglycemia, with long-term current use of insulin    CKD (chronic kidney disease) stage 3, GFR 30-59 ml/min    Acute respiratory failure with hypoxia           DISCUSSION/PLAN:   Creatinine in baseline range of 1.4-1.8  Clinically euvolemic on exam  Oral Lasix has been restarted, volume status stable  Currently no plans for CT with IV contrast  Antibiotics per infectious disease, dosed for GFR around 40  Her potassium has been stable so we will hold Lokelma for now  Continue Jardiance  Continue sodium bicarb at 3 times daily dosing  Follow-up a.m. labs    Continue to monitor electrolytes and volume closely, avoid IV contrast and nephrotoxic medications        Justice Grayson MD  [facility]   Office phone number: [phone]  Answering service phone number: [phone]    02/16/25  07:06 EST

## 2025-02-16 NOTE — PROGRESS NOTES
"  Infectious Diseases Progress Note    Roxanne Duncan MD     [facility]  [place]: 6 days  Patient Identification:  Name: Chrissy Gutierrez  Age: 74 y.o.  Sex: female  :  1950  MRN: [id number]         Primary Care Physician: Jai Steven MD        Subjective: Feeling better with improvement in her joint function and able to ambulate.  Denies any fever and chills.  Interval History: See consultation note.    Objective:    Scheduled Meds:aspirin, 81 mg, Oral, Daily  carvedilol, 12.5 mg, Oral, BID With Meals  cefTRIAXone, 2,000 mg, Intravenous, Q24H  doxycycline, 100 mg, Oral, Q12H  empagliflozin, 25 mg, Oral, Daily  enoxaparin, 40 mg, Subcutaneous, Q24H  furosemide, 20 mg, Oral, Daily  glipizide, 5 mg, Oral, QAM AC  insulin glargine, 10 Units, Subcutaneous, Daily  insulin lispro, 2-9 Units, Subcutaneous, 4x Daily AC & at Bedtime  ipratropium-albuterol, 3 mL, Nebulization, Q6H While Awake - RT  predniSONE, 40 mg, Oral, Daily With Breakfast  sodium bicarbonate, 650 mg, Oral, TID  sodium chloride, 10 mL, Intravenous, Q12H      Continuous Infusions:     Vital signs in last 24 hours:  Temp:  [97.4 °F (36.3 °C)-98.8 °F (37.1 °C)] 98.8 °F (37.1 °C)  Heart Rate:  [81-96] 88  Resp:  [16-18] 18  BP: ()/(63-73) 112/70    Intake/Output:    Intake/Output Summary (Last 24 hours) at 2025 0803  Last data filed at 2025 0610  Gross per 24 hour   Intake 780 ml   Output 3800 ml   Net -3020 ml       Exam:  /70   Pulse 88   Temp 98.8 °F (37.1 °C) (Oral)   Resp 18   Ht 170.2 cm (67\")   Wt 92.5 kg (204 lb)   SpO2 90%   BMI 31.95 kg/m²   Patient is examined using the personal protective equipment as per guidelines from infection control for this particular patient as enacted.  Hand washing was performed before and after patient interaction.  General Appearance:    Alert, cooperative, no distress, AAOx3                          Head:    Normocephalic, without obvious abnormality, atraumatic   "                         Eyes:    PERRL, conjunctivae/corneas clear, EOM's intact, both eyes                         Throat:   Lips, tongue, gums normal; oral mucosa pink and moist                           Neck:   Supple, symmetrical, trachea midline, no JVD                         Lungs:    Clear to auscultation bilaterally, respirations unlabored                 Chest Wall:    No tenderness or deformity                          Heart:  S1-S2 regular                  Abdomen:   Soft nontender                 Extremities: Left knee and lower extremities wrapped in the Ace wrap.                        Pulses:   Pulses palpable in all extremities                            Skin:   Skin is warm and dry,  no rashes or palpable lesions                  Neurologic: Alert and oriented x 3       Data Review:    I reviewed the patient's new clinical results.  Results from last 7 days   Lab Units 02/16/25  0417 02/15/25  0531 02/14/25  0427 02/13/25  0416 02/12/25 0618 02/11/25  0654 02/10/25  0748   WBC 10*3/mm3 11.70* 10.16 15.00* 17.05* 18.00* 17.81* 16.07*   HEMOGLOBIN g/dL 10.6* 10.0* 9.7* 9.9* 9.4* 10.0* 10.3*   PLATELETS 10*3/mm3 483* 485* 430 395 387 408 411     Results from last 7 days   Lab Units 02/16/25  0417 02/15/25  0531 02/14/25  0427 02/13/25  0416 02/12/25 0618 02/11/25  0654 02/10/25  0748   SODIUM mmol/L 139 137 137 139 141 141 137   POTASSIUM mmol/L 4.8 4.3 4.3 4.3 4.2 4.5 4.5   CHLORIDE mmol/L 102 102 104 104 108* 109* 105   CO2 mmol/L 22.9 22.0 19.0* 18.0* 18.0* 16.4* 15.2*   BUN mg/dL 66* 55* 49* 37* 31* 36* 53*   CREATININE mg/dL 1.49* 1.15* 1.43* 1.40* 1.25* 1.21* 1.58*   CALCIUM mg/dL 8.7 8.6 8.8 9.0 8.8 8.6 8.6   GLUCOSE mg/dL 240* 137* 136* 202* 80 98 209*     Microbiology Results (last 10 days)       Procedure Component Value - Date/Time    Tissue / Bone Culture - Tissue, Knee, Left [[id number]] Collected: 02/12/25 1341    Lab Status: Final result Specimen: Tissue from Knee, Left Updated:  02/15/25 0726     Tissue Culture No growth at 3 days     Gram Stain No WBCs or organisms seen    Anaerobic Culture - Synovial Fluid, Knee, Left [[id number]]  (Normal) Collected: 02/12/25 1327    Lab Status: Preliminary result Specimen: Synovial Fluid from Knee, Left Updated: 02/15/25 0746     Anaerobic Culture No anaerobes isolated at 3 days    Body Fluid Culture - Synovial Fluid, Knee, Left [[id number]] Collected: 02/12/25 1327    Lab Status: Preliminary result Specimen: Synovial Fluid from Knee, Left Updated: 02/16/25 0621     Body Fluid Culture No growth at 4 days     Gram Stain Many (4+) WBCs seen      No organisms seen    Body Fluid Culture - Synovial Fluid, Knee, Left [[id number]] Collected: 02/11/25 1745    Lab Status: Final result Specimen: Synovial Fluid from Knee, Left Updated: 02/16/25 0620     Body Fluid Culture No growth at 5 days     Gram Stain Moderate (3+) WBCs seen      No organisms seen    Anaerobic Culture - Synovial Fluid, Knee, Left [[id number]]  (Normal) Collected: 02/11/25 1745    Lab Status: Final result Specimen: Synovial Fluid from Knee, Left Updated: 02/16/25 0701     Anaerobic Culture No anaerobes isolated at 5 days    Urine Culture - Urine, Urine, Clean Catch [[id number]]  (Abnormal)  (Susceptibility) Collected: 02/10/25 0502    Lab Status: Final result Specimen: Urine, Clean Catch Updated: 02/12/25 0903     Urine Culture >100,000 CFU/mL Escherichia coli    Narrative:      Colonization of the urinary tract without infection is common. Treatment is discouraged unless the patient is symptomatic, pregnant, or undergoing an invasive urologic procedure.    Susceptibility        Escherichia coli      TICO      Amoxicillin + Clavulanate Susceptible      Ampicillin Susceptible      Ampicillin + Sulbactam Susceptible      Cefazolin (Urine) Susceptible      Cefepime Susceptible      Ceftazidime Susceptible      Ceftriaxone Susceptible      Gentamicin Susceptible      Levofloxacin Susceptible       Nitrofurantoin Susceptible      Piperacillin + Tazobactam Susceptible      Trimethoprim + Sulfamethoxazole Susceptible                           Blood Culture - Blood, Arm, Left [[id number]]  (Normal) Collected: 02/10/25 0501    Lab Status: Final result Specimen: Blood from Arm, Left Updated: 02/15/25 0515     Blood Culture No growth at 5 days    Blood Culture - Blood, Arm, Left [[id number]]  (Normal) Collected: 02/10/25 0500    Lab Status: Final result Specimen: Blood from Arm, Left Updated: 02/15/25 0515     Blood Culture No growth at 5 days    Respiratory Panel PCR w/COVID-19(SARS-CoV-2) [facility] In-House, NP Swab in UTM/VTM, 2 HR TAT - Swab, Nasopharynx [[id number]]  (Abnormal) Collected: 02/10/25 0401    Lab Status: Final result Specimen: Swab from Nasopharynx Updated: 02/10/25 0824     ADENOVIRUS, PCR Not Detected     Coronavirus 229E Not Detected     Coronavirus HKU1 Not Detected     Coronavirus NL63 Not Detected     Coronavirus OC43 Not Detected     COVID19 Not Detected     Human Metapneumovirus Not Detected     Human Rhinovirus/Enterovirus Not Detected     Influenza A PCR Equivocal     Comment: Appended report. These results have been appended to a previously preliminary verified report.        Influenza B PCR Not Detected     Parainfluenza Virus 1 Not Detected     Parainfluenza Virus 2 Not Detected     Parainfluenza Virus 3 Not Detected     Parainfluenza Virus 4 Not Detected     RSV, PCR Not Detected     Bordetella pertussis pcr Not Detected     Bordetella parapertussis PCR Not Detected     Chlamydophila pneumoniae PCR Not Detected     Mycoplasma pneumo by PCR Not Detected    Narrative:      In the setting of a positive respiratory panel with a viral infection PLUS a negative procalcitonin without other underlying concern for bacterial infection, consider observing off antibiotics or discontinuation of antibiotics and continue supportive care. If the respiratory panel is positive for atypical  bacterial infection (Bordetella pertussis, Chlamydophila pneumoniae, or Mycoplasma pneumoniae), consider antibiotic de-escalation to target atypical bacterial infection.              Assessment:    Bacterial pneumonia    Sepsis, unspecified organism    Influenza A    Acute pain of left knee    Type 2 diabetes mellitus with hyperglycemia, with long-term current use of insulin    CKD (chronic kidney disease) stage 3, GFR 30-59 ml/min    Acute respiratory failure with hypoxia    74-year-old female with  1-painful tender left knee with decline in function status post joint aspiration followed by I&D and washout in the setting of respiratory tract infection with influenza A and abnormal urinalysis consistent with UTI-this presentation of tender painful knee with worsening function and abnormal joint fluid analysis with crystal for uric acid positive could very well be due to combination of multiple pathologies happening together:             -Acute gouty arthritis with             -Secondary septic arthritis due to hematogenous seeding from the lung or urine versus             -Progression of osteoarthritis.  2-acute influenza A with secondary bacterial pneumonia  3-abnormal urinalysis with urine culture without any specific urinary symptoms  4-acute on chronic renal insufficiency with prior history of left-sided hydronephrosis and history of right nephrectomy  5-osteoarthritis of the right shoulder and wrist without any fracture  6-hypertension  7-peripheral neuropathy  8-history of renal cell carcinoma-history of right nephrectomy  9-type 2 diabetes.     Recommendations/Discussions:  See my discussion and recommendations on 2/13/2025 and 2/14/2025.  Management of concomitant gout with short course of oral steroid per primary team and orthopedic surgery service.  Patient will need out of hospital follow-up with orthopedic surgery service as per their recommendations  See discussion in the additional progress note on  2/14/2025 for alternate oral antibiotic therapy as a second line option which is better than taking no specific as she is still very reluctant for IV treatment as recommended below.  Ideal treatment for this presentation is discussed will be 4 weeks of IV Rocephin from last surgical intervention on 2/11/2025.  Following orders are written for case management:   Please arrange for 4 weeks of IV Rocephin at 2 g 24 hours starting 2/11/2025.  Check weekly CBC CMP and CRP and call abnormal results at [phone].  Remove midline/PICC line after completion of antibiotic therapy  Please educate patient to reach out to Dr. Duncan on once a week basis at [phone] to go over review of system to monitor antibiotic toxicity and effectiveness of treatment.  Diagnosis:  1-painful tender left knee with decline in function status post joint aspiration followed by I&D and washout in the setting of respiratory tract infection with influenza A and abnormal urinalysis consistent with UTI-this presentation of tender painful knee with worsening function and abnormal joint fluid analysis with crystal for uric acid positive could very well be due to combination of multiple pathologies happening together:             -Acute gouty arthritis with             -Secondary septic arthritis due to hematogenous seeding from the lung or urine versus             -Progression of osteoarthritis.  2-acute influenza A with secondary bacterial pneumonia  3-abnormal urinalysis with urine culture without any specific urinary symptoms  Roxanne Duncan MD  2/16/2025  08:03 EST    Parts of this note may be an electronic transcription/translation of spoken language to printed text using the Dragon dictation system.

## 2025-02-16 NOTE — PLAN OF CARE
Problem: Adult Inpatient Plan of Care  Goal: Plan of Care Review  Outcome: Progressing  Flowsheets (Taken 2/16/2025 0446)  Progress: improving  Plan of Care Reviewed With: patient  Goal: Absence of Hospital-Acquired Illness or Injury  Outcome: Progressing  Intervention: Identify and Manage Fall Risk  Recent Flowsheet Documentation  Taken 2/16/2025 0446 by Venice Mills, RN  Safety Promotion/Fall Prevention:   assistive device/personal items within reach   clutter free environment maintained   fall prevention program maintained   nonskid shoes/slippers when out of bed   room organization consistent   safety round/check completed  Taken 2/16/2025 0220 by Venice Mills, RN  Safety Promotion/Fall Prevention:   assistive device/personal items within reach   clutter free environment maintained   fall prevention program maintained   nonskid shoes/slippers when out of bed   room organization consistent   safety round/check completed  Taken 2/16/2025 0015 by Venice Mills, RN  Safety Promotion/Fall Prevention:   assistive device/personal items within reach   clutter free environment maintained   fall prevention program maintained   nonskid shoes/slippers when out of bed   room organization consistent   safety round/check completed  Taken 2/15/2025 2208 by Venice Mills, RN  Safety Promotion/Fall Prevention:   assistive device/personal items within reach   clutter free environment maintained   fall prevention program maintained   nonskid shoes/slippers when out of bed   room organization consistent   safety round/check completed  Taken 2/15/2025 2048 by Venice Mills, RN  Safety Promotion/Fall Prevention:   activity supervised   assistive device/personal items within reach   clutter free environment maintained   fall prevention program maintained   lighting adjusted   nonskid shoes/slippers when out of bed   room organization consistent   safety round/check completed  Intervention: Prevent Skin Injury  Recent Flowsheet  Documentation  Taken 2/16/2025 0446 by Venice Mills RN  Body Position: position changed independently  Taken 2/16/2025 0220 by Venice Mills RN  Body Position: position changed independently  Taken 2/16/2025 0015 by Venice Mills RN  Body Position: position changed independently  Taken 2/15/2025 2208 by Venice Mills RN  Body Position: position changed independently  Taken 2/15/2025 2048 by Venice Mills RN  Body Position: position changed independently  Intervention: Prevent Infection  Recent Flowsheet Documentation  Taken 2/15/2025 2048 by Venice Mills RN  Infection Prevention:   environmental surveillance performed   hand hygiene promoted   personal protective equipment utilized   rest/sleep promoted   single patient room provided  Goal: Optimal Comfort and Wellbeing  Outcome: Progressing  Goal: Readiness for Transition of Care  Outcome: Progressing     Problem: Fall Injury Risk  Goal: Absence of Fall and Fall-Related Injury  Outcome: Progressing  Intervention: Identify and Manage Contributors  Recent Flowsheet Documentation  Taken 2/15/2025 2048 by Venice Mills RN  Medication Review/Management: medications reviewed  Intervention: Promote Injury-Free Environment  Recent Flowsheet Documentation  Taken 2/16/2025 0446 by Venice Mills RN  Safety Promotion/Fall Prevention:   assistive device/personal items within reach   clutter free environment maintained   fall prevention program maintained   nonskid shoes/slippers when out of bed   room organization consistent   safety round/check completed  Taken 2/16/2025 0220 by Venice Mills RN  Safety Promotion/Fall Prevention:   assistive device/personal items within reach   clutter free environment maintained   fall prevention program maintained   nonskid shoes/slippers when out of bed   room organization consistent   safety round/check completed  Taken 2/16/2025 0015 by Venice Mills RN  Safety Promotion/Fall Prevention:   assistive device/personal items  within reach   clutter free environment maintained   fall prevention program maintained   nonskid shoes/slippers when out of bed   room organization consistent   safety round/check completed  Taken 2/15/2025 2208 by Venice Mills RN  Safety Promotion/Fall Prevention:   assistive device/personal items within reach   clutter free environment maintained   fall prevention program maintained   nonskid shoes/slippers when out of bed   room organization consistent   safety round/check completed  Taken 2/15/2025 2048 by Venice Mills RN  Safety Promotion/Fall Prevention:   activity supervised   assistive device/personal items within reach   clutter free environment maintained   fall prevention program maintained   lighting adjusted   nonskid shoes/slippers when out of bed   room organization consistent   safety round/check completed     Problem: Sepsis/Septic Shock  Goal: Absence of Bleeding  Outcome: Progressing  Goal: Blood Glucose Level Within Target Range  Outcome: Progressing  Goal: Absence of Infection Signs and Symptoms  Outcome: Progressing  Intervention: Initiate Sepsis Management  Recent Flowsheet Documentation  Taken 2/15/2025 2048 by Venice Mills RN  Infection Prevention:   environmental surveillance performed   hand hygiene promoted   personal protective equipment utilized   rest/sleep promoted   single patient room provided  Isolation Precautions:   precautions maintained   droplet  Intervention: Promote Recovery  Recent Flowsheet Documentation  Taken 2/16/2025 0446 by Venice Mills RN  Activity Management: (asleep) other (see comments)  Taken 2/16/2025 0220 by Venice Mills RN  Activity Management: (asleep) other (see comments)  Taken 2/16/2025 0015 by Venice Mills RN  Activity Management: (asleep) other (see comments)  Taken 2/15/2025 2208 by Venice Mills RN  Activity Management: activity minimized  Taken 2/15/2025 2048 by Venice Mills RN  Activity Management: up in chair  Goal: Optimal Nutrition  Delivery  Outcome: Progressing     Problem: Skin Injury Risk Increased  Goal: Skin Health and Integrity  Outcome: Progressing  Intervention: Optimize Skin Protection  Recent Flowsheet Documentation  Taken 2/16/2025 0446 by Venice Mills RN  Activity Management: (asleep) other (see comments)  Taken 2/16/2025 0220 by Venice Mills RN  Activity Management: (asleep) other (see comments)  Taken 2/16/2025 0015 by Venice Mills RN  Activity Management: (asleep) other (see comments)  Taken 2/15/2025 2208 by Venice Mills RN  Activity Management: activity minimized  Taken 2/15/2025 2048 by Venice Mills RN  Activity Management: up in chair   Goal Outcome Evaluation:  Plan of Care Reviewed With: patient        Progress: improving

## 2025-02-17 LAB
ALBUMIN SERPL-MCNC: 3.2 G/DL (ref 3.5–5.2)
ANION GAP SERPL CALCULATED.3IONS-SCNC: 14.3 MMOL/L (ref 5–15)
BACTERIA FLD CULT: NORMAL
BACTERIA SPEC ANAEROBE CULT: NORMAL
BASOPHILS # BLD AUTO: 0.01 10*3/MM3 (ref 0–0.2)
BASOPHILS NFR BLD AUTO: 0.1 % (ref 0–1.5)
BUN SERPL-MCNC: 65 MG/DL (ref 8–23)
BUN/CREAT SERPL: 49.6 (ref 7–25)
CALCIUM SPEC-SCNC: 9.1 MG/DL (ref 8.6–10.5)
CHLORIDE SERPL-SCNC: 99 MMOL/L (ref 98–107)
CO2 SERPL-SCNC: 24.7 MMOL/L (ref 22–29)
CREAT SERPL-MCNC: 1.31 MG/DL (ref 0.57–1)
DEPRECATED RDW RBC AUTO: 40.9 FL (ref 37–54)
EBV VCA IGM SER IA-ACNC: <36 U/ML (ref 0–35.9)
EGFRCR SERPLBLD CKD-EPI 2021: 42.8 ML/MIN/1.73
ENDOMYSIUM IGA SER QL: NEGATIVE
EOSINOPHIL # BLD AUTO: 0.03 10*3/MM3 (ref 0–0.4)
EOSINOPHIL NFR BLD AUTO: 0.2 % (ref 0.3–6.2)
ERYTHROCYTE [DISTWIDTH] IN BLOOD BY AUTOMATED COUNT: 12.4 % (ref 12.3–15.4)
GLIADIN PEPTIDE IGA SER-ACNC: 48 UNITS (ref 0–19)
GLIADIN PEPTIDE IGG SER-ACNC: 2 UNITS (ref 0–19)
GLUCOSE BLDC GLUCOMTR-MCNC: 145 MG/DL (ref 70–130)
GLUCOSE BLDC GLUCOMTR-MCNC: 212 MG/DL (ref 70–130)
GLUCOSE BLDC GLUCOMTR-MCNC: 332 MG/DL (ref 70–130)
GLUCOSE BLDC GLUCOMTR-MCNC: 462 MG/DL (ref 70–130)
GLUCOSE SERPL-MCNC: 223 MG/DL (ref 65–99)
GRAM STN SPEC: NORMAL
GRAM STN SPEC: NORMAL
HCT VFR BLD AUTO: 34 % (ref 34–46.6)
HGB BLD-MCNC: 10.9 G/DL (ref 12–15.9)
IGA SERPL-MCNC: 268 MG/DL (ref 64–422)
IMM GRANULOCYTES # BLD AUTO: 0.13 10*3/MM3 (ref 0–0.05)
IMM GRANULOCYTES NFR BLD AUTO: 1 % (ref 0–0.5)
LYMPHOCYTES # BLD AUTO: 2.26 10*3/MM3 (ref 0.7–3.1)
LYMPHOCYTES NFR BLD AUTO: 17.7 % (ref 19.6–45.3)
MCH RBC QN AUTO: 29.5 PG (ref 26.6–33)
MCHC RBC AUTO-ENTMCNC: 32.1 G/DL (ref 31.5–35.7)
MCV RBC AUTO: 91.9 FL (ref 79–97)
MITOCHONDRIA M2 IGG SER-ACNC: <20 UNITS (ref 0–20)
MONOCYTES # BLD AUTO: 0.61 10*3/MM3 (ref 0.1–0.9)
MONOCYTES NFR BLD AUTO: 4.8 % (ref 5–12)
NEUTROPHILS NFR BLD AUTO: 76.2 % (ref 42.7–76)
NEUTROPHILS NFR BLD AUTO: 9.7 10*3/MM3 (ref 1.7–7)
NRBC BLD AUTO-RTO: 0 /100 WBC (ref 0–0.2)
PHOSPHATE SERPL-MCNC: 4.5 MG/DL (ref 2.5–4.5)
PLATELET # BLD AUTO: 575 10*3/MM3 (ref 140–450)
PMV BLD AUTO: 9.2 FL (ref 6–12)
POTASSIUM SERPL-SCNC: 4.2 MMOL/L (ref 3.5–5.2)
RBC # BLD AUTO: 3.7 10*6/MM3 (ref 3.77–5.28)
SMA IGG SER-ACNC: 6 UNITS (ref 0–19)
SODIUM SERPL-SCNC: 138 MMOL/L (ref 136–145)
TTG IGA SER-ACNC: 7 U/ML (ref 0–3)
TTG IGG SER-ACNC: 6 U/ML (ref 0–5)
WBC NRBC COR # BLD AUTO: 12.74 10*3/MM3 (ref 3.4–10.8)

## 2025-02-17 PROCEDURE — 25010000002 ENOXAPARIN PER 10 MG: Performed by: ORTHOPAEDIC SURGERY

## 2025-02-17 PROCEDURE — 25010000002 CEFTRIAXONE PER 250 MG: Performed by: INTERNAL MEDICINE

## 2025-02-17 PROCEDURE — 82948 REAGENT STRIP/BLOOD GLUCOSE: CPT

## 2025-02-17 PROCEDURE — 94664 DEMO&/EVAL PT USE INHALER: CPT

## 2025-02-17 PROCEDURE — 63710000001 INSULIN GLARGINE PER 5 UNITS: Performed by: ORTHOPAEDIC SURGERY

## 2025-02-17 PROCEDURE — 80069 RENAL FUNCTION PANEL: CPT | Performed by: INTERNAL MEDICINE

## 2025-02-17 PROCEDURE — 63710000001 PREDNISONE PER 1 MG: Performed by: INTERNAL MEDICINE

## 2025-02-17 PROCEDURE — 85025 COMPLETE CBC W/AUTO DIFF WBC: CPT | Performed by: INTERNAL MEDICINE

## 2025-02-17 PROCEDURE — 94799 UNLISTED PULMONARY SVC/PX: CPT

## 2025-02-17 PROCEDURE — 94761 N-INVAS EAR/PLS OXIMETRY MLT: CPT

## 2025-02-17 PROCEDURE — 63710000001 INSULIN LISPRO (HUMAN) PER 5 UNITS: Performed by: ORTHOPAEDIC SURGERY

## 2025-02-17 RX ADMIN — SODIUM BICARBONATE 650 MG TABLET 650 MG: at 17:08

## 2025-02-17 RX ADMIN — INSULIN GLARGINE 10 UNITS: 100 INJECTION, SOLUTION SUBCUTANEOUS at 09:04

## 2025-02-17 RX ADMIN — SODIUM BICARBONATE 650 MG TABLET 650 MG: at 09:03

## 2025-02-17 RX ADMIN — INSULIN LISPRO 4 UNITS: 100 INJECTION, SOLUTION INTRAVENOUS; SUBCUTANEOUS at 11:55

## 2025-02-17 RX ADMIN — INSULIN LISPRO 9 UNITS: 100 INJECTION, SOLUTION INTRAVENOUS; SUBCUTANEOUS at 21:12

## 2025-02-17 RX ADMIN — CARVEDILOL 12.5 MG: 12.5 TABLET, FILM COATED ORAL at 09:03

## 2025-02-17 RX ADMIN — PREDNISONE 40 MG: 20 TABLET ORAL at 09:03

## 2025-02-17 RX ADMIN — OXYCODONE HYDROCHLORIDE 5 MG: 5 TABLET ORAL at 06:37

## 2025-02-17 RX ADMIN — SODIUM BICARBONATE 650 MG TABLET 650 MG: at 21:11

## 2025-02-17 RX ADMIN — GLIPIZIDE 5 MG: 5 TABLET ORAL at 06:35

## 2025-02-17 RX ADMIN — Medication 10 ML: at 21:12

## 2025-02-17 RX ADMIN — ENOXAPARIN SODIUM 40 MG: 100 INJECTION SUBCUTANEOUS at 14:14

## 2025-02-17 RX ADMIN — CARVEDILOL 12.5 MG: 12.5 TABLET, FILM COATED ORAL at 17:08

## 2025-02-17 RX ADMIN — ASPIRIN 81 MG: 81 TABLET, COATED ORAL at 09:03

## 2025-02-17 RX ADMIN — OXYCODONE HYDROCHLORIDE 5 MG: 5 TABLET ORAL at 21:15

## 2025-02-17 RX ADMIN — Medication 10 ML: at 09:05

## 2025-02-17 RX ADMIN — IPRATROPIUM BROMIDE AND ALBUTEROL SULFATE 3 ML: 2.5; .5 SOLUTION RESPIRATORY (INHALATION) at 06:58

## 2025-02-17 RX ADMIN — FUROSEMIDE 20 MG: 20 TABLET ORAL at 09:02

## 2025-02-17 RX ADMIN — CEFTRIAXONE 2000 MG: 2 INJECTION, POWDER, FOR SOLUTION INTRAMUSCULAR; INTRAVENOUS at 09:05

## 2025-02-17 RX ADMIN — EMPAGLIFLOZIN 25 MG: 25 TABLET, FILM COATED ORAL at 09:03

## 2025-02-17 RX ADMIN — DOXYCYCLINE 100 MG: 100 CAPSULE ORAL at 09:04

## 2025-02-17 RX ADMIN — IPRATROPIUM BROMIDE AND ALBUTEROL SULFATE 3 ML: 2.5; .5 SOLUTION RESPIRATORY (INHALATION) at 11:00

## 2025-02-17 RX ADMIN — IPRATROPIUM BROMIDE AND ALBUTEROL SULFATE 3 ML: 2.5; .5 SOLUTION RESPIRATORY (INHALATION) at 19:58

## 2025-02-17 RX ADMIN — INSULIN LISPRO 7 UNITS: 100 INJECTION, SOLUTION INTRAVENOUS; SUBCUTANEOUS at 17:08

## 2025-02-17 NOTE — SIGNIFICANT NOTE
02/17/25 1123   OTHER   Discipline physical therapist   Rehab Time/Intention   Session Not Performed other (see comments)  (Spoke with RN who reports inc bleeding, with surgery consulted. Spoke with Pt. who politely declines this date, will f/u next service date as appropriate.)   Therapy Assessment/Plan (PT)   Criteria for Skilled Interventions Met (PT) yes   Recommendation   PT - Next Appointment 02/18/25

## 2025-02-17 NOTE — PROGRESS NOTES
[facility]     Progress Note    Patient Name: Chrissy Gutierrez  : 1950  MRN: [id number]  Primary Care Physician:  Jai Steven MD  Date of admission: 2/10/2025    Subjective   Subjective     Chief Complaint: oral on ckd     History of Present Illness  Patient with oral on ckd III admitted with flu    Review of Systems    Objective   Objective     Vitals:   Temp:  [97 °F (36.1 °C)-97.9 °F (36.6 °C)] 97.9 °F (36.6 °C)  Heart Rate:  [67-86] 67  Resp:  [16-20] 20  BP: (108-123)/(64-74) 108/64  Flow (L/min) (Oxygen Therapy):  [2-3] 3    Physical Exam   General Appearance:  In no acute distress.    HEENT: Normal HEENT exam.     Extremities: .  There is no deformity, effusion or dependent edema.    Neurological: Patient is alert     Result Review    Result Review:  I have personally reviewed the results from the time of this admission to 2025 08:02 EST and agree with these findings:  []  Laboratory list / accordion  []  Microbiology  []  Radiology  []  EKG/Telemetry   []  Cardiology/Vascular   []  Pathology  []  Old records  []  Other:  Most notable findings include:       Assessment & Plan   Assessment / Plan     Brief Patient Summary:  Chrissy Gutierrez is a 74 y.o. female who has influenza and ckd III    Active Hospital Problems:  Active Hospital Problems    Diagnosis     **Bacterial pneumonia     Sepsis, unspecified organism     Influenza A     Acute pain of left knee     Type 2 diabetes mellitus with hyperglycemia, with long-term current use of insulin     CKD (chronic kidney disease) stage 3, GFR 30-59 ml/min     Acute respiratory failure with hypoxia      Plan:   CKD stage IIIa, stable, near baseline    Metabolic acidosis    Bacterial pneumonia, on IV/p.o. antibiotics    Sepsis, unspecified organism    Influenza A  Chronic diastolic CHF, back on oral diuretics today    Acute pain of left knee    Type 2 diabetes mellitus with hyperglycemia, with long-term current use of insulin    CKD (chronic  kidney disease) stage 3, GFR 30-59 ml/min    Acute respiratory failure with hypoxia    Patient seen and examined. Awake,alert. No distress. Labs and chart reviewed. Cr stable at 1.4.  likely baseline.  Denies shortness of breath. Continue lasix and bicarb. Will follow    Kitty Vallejo MD

## 2025-02-17 NOTE — PLAN OF CARE
Goal Outcome Evaluation:   VSS throughout shift. Pt has no c/o pain unless moving left knee. Pt has no c/o nausea. Pt tolerating PO intake well, appetite is good. Dressing changed twice this shift, once by surgeon. Call light in reach, bed alarm set.

## 2025-02-17 NOTE — CASE MANAGEMENT/SOCIAL WORK
Continued Stay Note  [facility]     Patient Name: Chrissy Gutierrez  MRN: [id number]  Today's Date: 2/17/2025    Admit Date: 2/10/2025    Plan: SNF- referrals pending- will need pre-cert   Discharge Plan       Row Name 02/17/25 1707       Plan    Plan Comments [facility] has declined and [facility] facilities have all declined due to medication cost.  Patient will need 4 weeks IV Rocephin. Patient updated at bedside and will discuss additional options with her son. Provided her with SNF list. CCP will follow up tomorrow.                   Discharge Codes    No documentation.                 Expected Discharge Date and Time       Expected Discharge Date Expected Discharge Time    Feb 19, 2025               Elida Hayes RN

## 2025-02-17 NOTE — PLAN OF CARE
Goal Outcome Evaluation:              Outcome Evaluation: Patient a/o x4, cooperative with care. Pt's staples on upper L knee bleeding this shift'; bandages changed. All medicine given as ordered. Pt on 2L o2, purewick in place. No new issues noted. This RN wore appropriate PPE during care. See v/s and labs.                              Detail Level: Zone Continue Regimen: Using the anti fungal medication as previously prescribed by PCP Continue Regimen: Aldara cream 5% to the scalp/ face every other night and wash off in the morning as needed. Start using a week from today

## 2025-02-17 NOTE — PROGRESS NOTES
"  Infectious Diseases Progress Note    Roxanne Duncan MD     [facility]  [place]: 7 days  Patient Identification:  Name: Chrissy Gutierrez  Age: 74 y.o.  Sex: female  :  1950  MRN: [id number]         Primary Care Physician: Jai Steven MD        Subjective: Feeling better with decreased pain and discomfort in her joints including her left knee.  Interval History: See consultation note.    Objective:    Scheduled Meds:aspirin, 81 mg, Oral, Daily  carvedilol, 12.5 mg, Oral, BID With Meals  cefTRIAXone, 2,000 mg, Intravenous, Q24H  doxycycline, 100 mg, Oral, Q12H  empagliflozin, 25 mg, Oral, Daily  enoxaparin, 40 mg, Subcutaneous, Q24H  furosemide, 20 mg, Oral, Daily  glipizide, 5 mg, Oral, QAM AC  insulin glargine, 10 Units, Subcutaneous, Daily  insulin lispro, 2-9 Units, Subcutaneous, 4x Daily AC & at Bedtime  ipratropium-albuterol, 3 mL, Nebulization, Q6H While Awake - RT  predniSONE, 40 mg, Oral, Daily With Breakfast  sodium bicarbonate, 650 mg, Oral, TID  sodium chloride, 10 mL, Intravenous, Q12H      Continuous Infusions:     Vital signs in last 24 hours:  Temp:  [97 °F (36.1 °C)-98.6 °F (37 °C)] 97.9 °F (36.6 °C)  Heart Rate:  [67-88] 67  Resp:  [16-20] 20  BP: (108-123)/(64-74) 108/64    Intake/Output:    Intake/Output Summary (Last 24 hours) at 2025 0755  Last data filed at 2025 0622  Gross per 24 hour   Intake 740 ml   Output 3800 ml   Net -3060 ml       Exam:  /64 (BP Location: Right arm, Patient Position: Lying)   Pulse 67   Temp 97.9 °F (36.6 °C) (Oral)   Resp 20   Ht 170.2 cm (67\")   Wt 92.5 kg (204 lb)   SpO2 99%   BMI 31.95 kg/m²   Patient is examined using the personal protective equipment as per guidelines from infection control for this particular patient as enacted.  Hand washing was performed before and after patient interaction.  General Appearance:    Alert, cooperative, no distress, AAOx3                          Head:    Normocephalic, without " obvious abnormality, atraumatic                           Eyes:    PERRL, conjunctivae/corneas clear, EOM's intact, both eyes                         Throat:   Lips, tongue, gums normal; oral mucosa pink and moist                           Neck:   Supple, symmetrical, trachea midline, no JVD                         Lungs:    Clear to auscultation bilaterally, respirations unlabored                 Chest Wall:    No tenderness or deformity                          Heart:  S1-S2 regular                  Abdomen:   Soft nontender                 Extremities: Left knee and lower extremities wrapped in the Ace wrap.                        Pulses:   Pulses palpable in all extremities                            Skin:   Skin is warm and dry,  no rashes or palpable lesions                  Neurologic: Alert and oriented x 3       Data Review:    I reviewed the patient's new clinical results.  Results from last 7 days   Lab Units 02/16/25  0417 02/15/25  0531 02/14/25  0427 02/13/25  0416 02/12/25  0618 02/11/25  0654   WBC 10*3/mm3 11.70* 10.16 15.00* 17.05* 18.00* 17.81*   HEMOGLOBIN g/dL 10.6* 10.0* 9.7* 9.9* 9.4* 10.0*   PLATELETS 10*3/mm3 483* 485* 430 395 387 408     Results from last 7 days   Lab Units 02/16/25  0417 02/15/25  0531 02/14/25  0427 02/13/25  0416 02/12/25  0618 02/11/25  0654   SODIUM mmol/L 139 137 137 139 141 141   POTASSIUM mmol/L 4.8 4.3 4.3 4.3 4.2 4.5   CHLORIDE mmol/L 102 102 104 104 108* 109*   CO2 mmol/L 22.9 22.0 19.0* 18.0* 18.0* 16.4*   BUN mg/dL 66* 55* 49* 37* 31* 36*   CREATININE mg/dL 1.49* 1.15* 1.43* 1.40* 1.25* 1.21*   CALCIUM mg/dL 8.7 8.6 8.8 9.0 8.8 8.6   GLUCOSE mg/dL 240* 137* 136* 202* 80 98     Microbiology Results (last 10 days)       Procedure Component Value - Date/Time    Tissue / Bone Culture - Tissue, Knee, Left [[id number]] Collected: 02/12/25 1341    Lab Status: Final result Specimen: Tissue from Knee, Left Updated: 02/15/25 0726     Tissue Culture No growth at 3 days      Gram Stain No WBCs or organisms seen    Anaerobic Culture - Synovial Fluid, Knee, Left [[id number]]  (Normal) Collected: 02/12/25 1327    Lab Status: Final result Specimen: Synovial Fluid from Knee, Left Updated: 02/17/25 0710     Anaerobic Culture No anaerobes isolated at 5 days    Body Fluid Culture - Synovial Fluid, Knee, Left [[id number]] Collected: 02/12/25 1327    Lab Status: Final result Specimen: Synovial Fluid from Knee, Left Updated: 02/17/25 0742     Body Fluid Culture No growth at 5 days     Gram Stain Many (4+) WBCs seen      No organisms seen    Body Fluid Culture - Synovial Fluid, Knee, Left [[id number]] Collected: 02/11/25 1745    Lab Status: Final result Specimen: Synovial Fluid from Knee, Left Updated: 02/16/25 0620     Body Fluid Culture No growth at 5 days     Gram Stain Moderate (3+) WBCs seen      No organisms seen    Anaerobic Culture - Synovial Fluid, Knee, Left [[id number]]  (Normal) Collected: 02/11/25 1745    Lab Status: Final result Specimen: Synovial Fluid from Knee, Left Updated: 02/16/25 0701     Anaerobic Culture No anaerobes isolated at 5 days    Urine Culture - Urine, Urine, Clean Catch [[id number]]  (Abnormal)  (Susceptibility) Collected: 02/10/25 0502    Lab Status: Final result Specimen: Urine, Clean Catch Updated: 02/12/25 0903     Urine Culture >100,000 CFU/mL Escherichia coli    Narrative:      Colonization of the urinary tract without infection is common. Treatment is discouraged unless the patient is symptomatic, pregnant, or undergoing an invasive urologic procedure.    Susceptibility        Escherichia coli      TICO      Amoxicillin + Clavulanate Susceptible      Ampicillin Susceptible      Ampicillin + Sulbactam Susceptible      Cefazolin (Urine) Susceptible      Cefepime Susceptible      Ceftazidime Susceptible      Ceftriaxone Susceptible      Gentamicin Susceptible      Levofloxacin Susceptible      Nitrofurantoin Susceptible      Piperacillin + Tazobactam Susceptible       Trimethoprim + Sulfamethoxazole Susceptible                           Blood Culture - Blood, Arm, Left [[id number]]  (Normal) Collected: 02/10/25 0501    Lab Status: Final result Specimen: Blood from Arm, Left Updated: 02/15/25 0515     Blood Culture No growth at 5 days    Blood Culture - Blood, Arm, Left [[id number]]  (Normal) Collected: 02/10/25 0500    Lab Status: Final result Specimen: Blood from Arm, Left Updated: 02/15/25 0515     Blood Culture No growth at 5 days    Respiratory Panel PCR w/COVID-19(SARS-CoV-2) [facility] In-House, NP Swab in UTM/VTM, 2 HR TAT - Swab, Nasopharynx [[id number]]  (Abnormal) Collected: 02/10/25 0401    Lab Status: Final result Specimen: Swab from Nasopharynx Updated: 02/10/25 0824     ADENOVIRUS, PCR Not Detected     Coronavirus 229E Not Detected     Coronavirus HKU1 Not Detected     Coronavirus NL63 Not Detected     Coronavirus OC43 Not Detected     COVID19 Not Detected     Human Metapneumovirus Not Detected     Human Rhinovirus/Enterovirus Not Detected     Influenza A PCR Equivocal     Comment: Appended report. These results have been appended to a previously preliminary verified report.        Influenza B PCR Not Detected     Parainfluenza Virus 1 Not Detected     Parainfluenza Virus 2 Not Detected     Parainfluenza Virus 3 Not Detected     Parainfluenza Virus 4 Not Detected     RSV, PCR Not Detected     Bordetella pertussis pcr Not Detected     Bordetella parapertussis PCR Not Detected     Chlamydophila pneumoniae PCR Not Detected     Mycoplasma pneumo by PCR Not Detected    Narrative:      In the setting of a positive respiratory panel with a viral infection PLUS a negative procalcitonin without other underlying concern for bacterial infection, consider observing off antibiotics or discontinuation of antibiotics and continue supportive care. If the respiratory panel is positive for atypical bacterial infection (Bordetella pertussis, Chlamydophila pneumoniae, or  Mycoplasma pneumoniae), consider antibiotic de-escalation to target atypical bacterial infection.              Assessment:    Bacterial pneumonia    Sepsis, unspecified organism    Influenza A    Acute pain of left knee    Type 2 diabetes mellitus with hyperglycemia, with long-term current use of insulin    CKD (chronic kidney disease) stage 3, GFR 30-59 ml/min    Acute respiratory failure with hypoxia    74-year-old female with  1-painful tender left knee with decline in function status post joint aspiration followed by I&D and washout in the setting of respiratory tract infection with influenza A and abnormal urinalysis consistent with UTI-this presentation of tender painful knee with worsening function and abnormal joint fluid analysis with crystal for uric acid positive could very well be due to combination of multiple pathologies happening together:             -Acute gouty arthritis with             -Secondary septic arthritis due to hematogenous seeding from the lung or urine versus             -Progression of osteoarthritis.  2-acute influenza A with secondary bacterial pneumonia  3-abnormal urinalysis with urine culture without any specific urinary symptoms  4-acute on chronic renal insufficiency with prior history of left-sided hydronephrosis and history of right nephrectomy  5-osteoarthritis of the right shoulder and wrist without any fracture  6-hypertension  7-peripheral neuropathy  8-history of renal cell carcinoma-history of right nephrectomy  9-type 2 diabetes.     Recommendations/Discussions:  See my discussion and recommendations on 2/13/2025 and 2/14/2025.  Management of concomitant gout with short course of oral steroid per primary team and orthopedic surgery service.  Patient will need out of hospital follow-up with orthopedic surgery service as per their recommendations  See discussion in the additional progress note on 2/14/2025 for alternate oral antibiotic therapy as a second line option  which is better than taking no treatment as she is still very reluctant for IV treatment as recommended below.  Ideal treatment for this presentation is discussed will be 4 weeks of IV Rocephin from last surgical intervention on 2/11/2025.  Following orders are written for case management:   Please arrange for 4 weeks of IV Rocephin at 2 g 24 hours starting 2/11/2025.  Check weekly CBC CMP and CRP and call abnormal results at [phone].  Remove midline/PICC line after completion of antibiotic therapy  Please educate patient to reach out to Dr. Duncan on once a week basis at [phone] to go over review of system to monitor antibiotic toxicity and effectiveness of treatment.  Diagnosis:  1-painful tender left knee with decline in function status post joint aspiration followed by I&D and washout in the setting of respiratory tract infection with influenza A and abnormal urinalysis consistent with UTI-this presentation of tender painful knee with worsening function and abnormal joint fluid analysis with crystal for uric acid positive could very well be due to combination of multiple pathologies happening together:             -Acute gouty arthritis with             -Secondary septic arthritis due to hematogenous seeding from the lung or urine versus             -Progression of osteoarthritis.  2-acute influenza A with secondary bacterial pneumonia  3-abnormal urinalysis with urine culture without any specific urinary symptoms  Roxanne Duncan MD  2/17/2025  07:55 EST    Parts of this note may be an electronic transcription/translation of spoken language to printed text using the Dragon dictation system.

## 2025-02-18 LAB
ALBUMIN SERPL-MCNC: 2.8 G/DL (ref 3.5–5.2)
ANION GAP SERPL CALCULATED.3IONS-SCNC: 14.9 MMOL/L (ref 5–15)
BASOPHILS # BLD AUTO: 0.01 10*3/MM3 (ref 0–0.2)
BASOPHILS NFR BLD AUTO: 0.1 % (ref 0–1.5)
BUN SERPL-MCNC: 71 MG/DL (ref 8–23)
BUN/CREAT SERPL: 51.8 (ref 7–25)
CALCIUM SPEC-SCNC: 9.2 MG/DL (ref 8.6–10.5)
CHLORIDE SERPL-SCNC: 100 MMOL/L (ref 98–107)
CO2 SERPL-SCNC: 23.1 MMOL/L (ref 22–29)
CREAT SERPL-MCNC: 1.37 MG/DL (ref 0.57–1)
DEPRECATED RDW RBC AUTO: 40.8 FL (ref 37–54)
EGFRCR SERPLBLD CKD-EPI 2021: 40.6 ML/MIN/1.73
EOSINOPHIL # BLD AUTO: 0.02 10*3/MM3 (ref 0–0.4)
EOSINOPHIL NFR BLD AUTO: 0.1 % (ref 0.3–6.2)
ERYTHROCYTE [DISTWIDTH] IN BLOOD BY AUTOMATED COUNT: 12.6 % (ref 12.3–15.4)
GLUCOSE BLDC GLUCOMTR-MCNC: 228 MG/DL (ref 70–130)
GLUCOSE BLDC GLUCOMTR-MCNC: 315 MG/DL (ref 70–130)
GLUCOSE BLDC GLUCOMTR-MCNC: 345 MG/DL (ref 70–130)
GLUCOSE BLDC GLUCOMTR-MCNC: 518 MG/DL (ref 70–130)
GLUCOSE SERPL-MCNC: 188 MG/DL (ref 65–99)
HCT VFR BLD AUTO: 33.6 % (ref 34–46.6)
HGB BLD-MCNC: 11.1 G/DL (ref 12–15.9)
IMM GRANULOCYTES # BLD AUTO: 0.14 10*3/MM3 (ref 0–0.05)
IMM GRANULOCYTES NFR BLD AUTO: 1 % (ref 0–0.5)
LYMPHOCYTES # BLD AUTO: 2.07 10*3/MM3 (ref 0.7–3.1)
LYMPHOCYTES NFR BLD AUTO: 15.4 % (ref 19.6–45.3)
MCH RBC QN AUTO: 29.4 PG (ref 26.6–33)
MCHC RBC AUTO-ENTMCNC: 33 G/DL (ref 31.5–35.7)
MCV RBC AUTO: 89.1 FL (ref 79–97)
MONOCYTES # BLD AUTO: 0.78 10*3/MM3 (ref 0.1–0.9)
MONOCYTES NFR BLD AUTO: 5.8 % (ref 5–12)
NEUTROPHILS NFR BLD AUTO: 10.45 10*3/MM3 (ref 1.7–7)
NEUTROPHILS NFR BLD AUTO: 77.6 % (ref 42.7–76)
NRBC BLD AUTO-RTO: 0 /100 WBC (ref 0–0.2)
PHOSPHATE SERPL-MCNC: 5.2 MG/DL (ref 2.5–4.5)
PLATELET # BLD AUTO: 577 10*3/MM3 (ref 140–450)
PMV BLD AUTO: 9 FL (ref 6–12)
POTASSIUM SERPL-SCNC: 4.6 MMOL/L (ref 3.5–5.2)
RBC # BLD AUTO: 3.77 10*6/MM3 (ref 3.77–5.28)
SODIUM SERPL-SCNC: 138 MMOL/L (ref 136–145)
WBC NRBC COR # BLD AUTO: 13.47 10*3/MM3 (ref 3.4–10.8)

## 2025-02-18 PROCEDURE — 25010000002 CEFTRIAXONE PER 250 MG: Performed by: INTERNAL MEDICINE

## 2025-02-18 PROCEDURE — 85025 COMPLETE CBC W/AUTO DIFF WBC: CPT | Performed by: INTERNAL MEDICINE

## 2025-02-18 PROCEDURE — 63710000001 INSULIN LISPRO (HUMAN) PER 5 UNITS: Performed by: ORTHOPAEDIC SURGERY

## 2025-02-18 PROCEDURE — 94799 UNLISTED PULMONARY SVC/PX: CPT

## 2025-02-18 PROCEDURE — 97110 THERAPEUTIC EXERCISES: CPT

## 2025-02-18 PROCEDURE — 82948 REAGENT STRIP/BLOOD GLUCOSE: CPT

## 2025-02-18 PROCEDURE — 94664 DEMO&/EVAL PT USE INHALER: CPT

## 2025-02-18 PROCEDURE — 94761 N-INVAS EAR/PLS OXIMETRY MLT: CPT

## 2025-02-18 PROCEDURE — 63710000001 PREDNISONE PER 1 MG: Performed by: INTERNAL MEDICINE

## 2025-02-18 PROCEDURE — 63710000001 INSULIN GLARGINE PER 5 UNITS: Performed by: ORTHOPAEDIC SURGERY

## 2025-02-18 PROCEDURE — 63710000001 INSULIN LISPRO (HUMAN) PER 5 UNITS: Performed by: INTERNAL MEDICINE

## 2025-02-18 PROCEDURE — 63710000001 INSULIN GLARGINE PER 5 UNITS: Performed by: INTERNAL MEDICINE

## 2025-02-18 PROCEDURE — 80069 RENAL FUNCTION PANEL: CPT | Performed by: INTERNAL MEDICINE

## 2025-02-18 RX ORDER — INSULIN LISPRO 100 [IU]/ML
3-14 INJECTION, SOLUTION INTRAVENOUS; SUBCUTANEOUS
Status: DISCONTINUED | OUTPATIENT
Start: 2025-02-18 | End: 2025-02-25 | Stop reason: HOSPADM

## 2025-02-18 RX ORDER — ASPIRIN 81 MG/1
81 TABLET ORAL 2 TIMES DAILY
Status: DISCONTINUED | OUTPATIENT
Start: 2025-02-19 | End: 2025-02-25 | Stop reason: HOSPADM

## 2025-02-18 RX ADMIN — INSULIN LISPRO 10 UNITS: 100 INJECTION, SOLUTION INTRAVENOUS; SUBCUTANEOUS at 21:26

## 2025-02-18 RX ADMIN — SODIUM BICARBONATE 650 MG TABLET 650 MG: at 08:19

## 2025-02-18 RX ADMIN — CEFTRIAXONE 2000 MG: 2 INJECTION, POWDER, FOR SOLUTION INTRAMUSCULAR; INTRAVENOUS at 08:20

## 2025-02-18 RX ADMIN — IPRATROPIUM BROMIDE AND ALBUTEROL SULFATE 3 ML: 2.5; .5 SOLUTION RESPIRATORY (INHALATION) at 19:31

## 2025-02-18 RX ADMIN — SODIUM BICARBONATE 650 MG TABLET 650 MG: at 16:56

## 2025-02-18 RX ADMIN — SODIUM BICARBONATE 650 MG TABLET 650 MG: at 21:26

## 2025-02-18 RX ADMIN — INSULIN GLARGINE 10 UNITS: 100 INJECTION, SOLUTION SUBCUTANEOUS at 08:19

## 2025-02-18 RX ADMIN — PREDNISONE 40 MG: 20 TABLET ORAL at 08:19

## 2025-02-18 RX ADMIN — INSULIN LISPRO 7 UNITS: 100 INJECTION, SOLUTION INTRAVENOUS; SUBCUTANEOUS at 13:21

## 2025-02-18 RX ADMIN — OXYCODONE HYDROCHLORIDE 5 MG: 5 TABLET ORAL at 08:19

## 2025-02-18 RX ADMIN — INSULIN LISPRO 14 UNITS: 100 INJECTION, SOLUTION INTRAVENOUS; SUBCUTANEOUS at 16:56

## 2025-02-18 RX ADMIN — INSULIN LISPRO 2 UNITS: 100 INJECTION, SOLUTION INTRAVENOUS; SUBCUTANEOUS at 08:20

## 2025-02-18 RX ADMIN — FUROSEMIDE 20 MG: 20 TABLET ORAL at 08:19

## 2025-02-18 RX ADMIN — OXYCODONE HYDROCHLORIDE 5 MG: 5 TABLET ORAL at 17:58

## 2025-02-18 RX ADMIN — IPRATROPIUM BROMIDE AND ALBUTEROL SULFATE 3 ML: 2.5; .5 SOLUTION RESPIRATORY (INHALATION) at 06:41

## 2025-02-18 RX ADMIN — IPRATROPIUM BROMIDE AND ALBUTEROL SULFATE 3 ML: 2.5; .5 SOLUTION RESPIRATORY (INHALATION) at 10:51

## 2025-02-18 RX ADMIN — GLIPIZIDE 5 MG: 5 TABLET ORAL at 08:19

## 2025-02-18 RX ADMIN — CARVEDILOL 12.5 MG: 12.5 TABLET, FILM COATED ORAL at 17:00

## 2025-02-18 RX ADMIN — EMPAGLIFLOZIN 25 MG: 25 TABLET, FILM COATED ORAL at 08:19

## 2025-02-18 RX ADMIN — INSULIN GLARGINE 15 UNITS: 100 INJECTION, SOLUTION SUBCUTANEOUS at 21:26

## 2025-02-18 RX ADMIN — CARVEDILOL 12.5 MG: 12.5 TABLET, FILM COATED ORAL at 08:19

## 2025-02-18 NOTE — CASE MANAGEMENT/SOCIAL WORK
Continued Stay Note  [facility]     Patient Name: Chrissy Gutierrez  MRN: [id number]  Today's Date: 2/18/2025    Admit Date: 2/10/2025    Plan: SNF- referrals pending- will need pre-cert   Discharge Plan       Row Name 02/18/25 1707       Plan    Plan SNF- referrals pending- will need pre-cert    Patient/Family in Agreement with Plan yes    Plan Comments Spoke with son, Percy Gutierrez [phone], at bedside. He would like to try and see if [facility] has any beds now and could accept. Referrals placed to [facility] and [facility] as well per his request. Will need pre-cert. CCP will follow.                   Discharge Codes    No documentation.                 Expected Discharge Date and Time       Expected Discharge Date Expected Discharge Time    Feb 20, 2025               Elida Hayes RN

## 2025-02-18 NOTE — PLAN OF CARE
Goal Outcome Evaluation:  Patient a/o x4, NSR on monitor, Knee wrapped by surgeon, no new issues noted. See v/s and labs. This RN wore appropriate PPE during care, pt in droplet isolation for flu A. All meds given as ordered. Pt waiting on placement for rehab.

## 2025-02-18 NOTE — PLAN OF CARE
Goal Outcome Evaluation:  Plan of Care Reviewed With: patient        Progress: improving  Outcome Evaluation: Patient resting in bed upon PT arrival and agreeable to therapy. Chace for bed mobility, stood with Chace to a RW and ambulated 5' Chace with a RW. L LE Limiting mobility as well as generalized weakness. Continue to plan for SNF upon DC. PT will continue to follow.    Anticipated Discharge Disposition (PT): skilled nursing facility

## 2025-02-18 NOTE — THERAPY TREATMENT NOTE
Patient Name: Chrissy Gutierrez  : 1950    MRN: [id number]                              Today's Date: 2025       Admit Date: 2/10/2025    Visit Dx:     ICD-10-CM ICD-9-CM   1. Acute respiratory failure with hypoxia  J96.01 518.81   2. Pneumonia of right lower lobe due to infectious organism  J18.9 486   3. Acute pain of both knees  M25.561 338.19    M25.562 719.46   4. Generalized weakness  R53.1 780.79   5. SONI (acute kidney injury)  N17.9 584.9   6. Acute UTI  N39.0 599.0   7. Septic arthritis of knee, left  M00.9 711.06   8. Follow-up exam  Z09 V67.9     Patient Active Problem List   Diagnosis    Right lower lobe pneumonia    Sepsis, unspecified organism    Influenza A    Acute pain of left knee    Bacterial pneumonia    Type 2 diabetes mellitus with hyperglycemia, with long-term current use of insulin    CKD (chronic kidney disease) stage 3, GFR 30-59 ml/min    Acute respiratory failure with hypoxia     Past Medical History:   Diagnosis Date    Cancer     right kidney    Chronic kidney disease     Hypertension     Low back pain     Osteoarthritis     Peripheral neuropathy      Past Surgical History:   Procedure Laterality Date    BACK SURGERY      EPIDURAL BLOCK      KIDNEY SURGERY Right 2017    REMOVED RIGHT KIDNEY    KNEE INCISION AND DRAINAGE Left 2025    Procedure: KNEE INCISION AND DRAINAGE;  Surgeon: Arvind Ibarra MD;  Location: [facility];  Service: Orthopedics;  Laterality: Left;    LUMBAR DISCECTOMY FUSION INSTRUMENTATION N/A 2017    Procedure: 1 LEVEL LAMINECTOMY DECOMPRESSION L4 5 EXCISION FACET CYST;  Surgeon: Negrito Oconnell DO;  Location: [facility];  Service:     TUBAL ABDOMINAL LIGATION      WISDOM TOOTH EXTRACTION        General Information       Row Name 25 1028          Physical Therapy Time and Intention    Document Type progress note/recertification  -MS     Mode of Treatment physical therapy  -MS       Row Name 25 1028          General  Information    Existing Precautions/Restrictions fall  Droplet precautions for Influenza  -MS       Row Name 02/18/25 1028          Cognition    Orientation Status (Cognition) oriented x 3  -MS       Row Name 02/18/25 1028          Safety Issues/Impairments Affecting Functional Mobility    Impairments Affecting Function (Mobility) balance;pain;shortness of breath;strength;endurance/activity tolerance  -MS     Comment, Safety Issues/Impairments (Mobility) Gait belt and non skid socks donned.  -MS               User Key  (r) = Recorded By, (t) = Taken By, (c) = Cosigned By      Initials Name Provider Type    MS UzmaSofia, PT Physical Therapist                   Mobility       Row Name 02/18/25 1028          Bed Mobility    Supine-Sit [place] (Bed Mobility) minimum assist (75% patient effort);verbal cues;nonverbal cues (demo/gesture)  -MS     Assistive Device (Bed Mobility) bed rails;head of bed elevated  -MS     Comment, (Bed Mobility) SBA-CGA for sitting balance.  -MS       Row Name 02/18/25 1028          Transfers    Comment, (Transfers) Sequencing and hand placement cues.  -MS       Row Name 02/18/25 1028          Sit-Stand Transfer    Sit-Stand [place] (Transfers) minimum assist (75% patient effort);verbal cues  -MS     Assistive Device (Sit-Stand Transfers) walker, front-wheeled  -MS       Row Name 02/18/25 1028          Gait/Stairs (Locomotion)    [place] Level (Gait) minimum assist (75% patient effort);verbal cues;nonverbal cues (demo/gesture)  -MS     Assistive Device (Gait) walker, front-wheeled  -MS     Patient was able to Ambulate yes  -MS     Distance in Feet (Gait) 5  -MS     Deviations/Abnormal Patterns (Gait) trinh decreased;antalgic;gait speed decreased  -MS     Bilateral Gait Deviations forward flexed posture  -MS     Comment, (Gait/Stairs) Small steps from bed to chair, fatigued quickly, LLE limiting.  -MS       Row Name 02/18/25 1028          Mobility    Extremity  Weight-bearing Status right lower extremity  -MS     Right Lower Extremity (Weight-bearing Status) weight-bearing as tolerated (WBAT)  -MS               User Key  (r) = Recorded By, (t) = Taken By, (c) = Cosigned By      Initials Name Provider Type    MS GusmanSofia, PT Physical Therapist                   Obj/Interventions       Row Name 02/18/25 1029          Balance    Static Sitting Balance standby assist  -MS     Position, Sitting Balance sitting edge of bed  -MS     Static Standing Balance contact guard  -MS     Position/Device Used, Standing Balance supported;walker, front-wheeled  -MS               User Key  (r) = Recorded By, (t) = Taken By, (c) = Cosigned By      Initials Name Provider Type    MS GusmanSofia, PT Physical Therapist                   Goals/Plan       Row Name 02/18/25 1031          Bed Mobility Goal 1 (PT)    Activity/Assistive Device (Bed Mobility Goal 1, PT) bed mobility activities, all  -MS     [place] Level/Cues Needed (Bed Mobility Goal 1, PT) standby assist  -MS     Time Frame (Bed Mobility Goal 1, PT) short term goal (STG);10 days  -MS     Progress/Outcomes (Bed Mobility Goal 1, PT) goal ongoing  -MS       Row Name 02/18/25 1031          Transfer Goal 1 (PT)    Activity/Assistive Device (Transfer Goal 1, PT) sit-to-stand/stand-to-sit  -MS     [place] Level/Cues Needed (Transfer Goal 1, PT) contact guard required  -MS     Time Frame (Transfer Goal 1, PT) short term goal (STG);10 days  -MS     Progress/Outcome (Transfer Goal 1, PT) goal ongoing  -MS       Row Name 02/18/25 1031          Gait Training Goal 1 (PT)    Activity/Assistive Device (Gait Training Goal 1, PT) gait (walking locomotion)  -MS     [place] Level (Gait Training Goal 1, PT) contact guard required  -MS     Distance (Gait Training Goal 1, PT) 50  -MS     Time Frame (Gait Training Goal 1, PT) short term goal (STG);10 days  -MS     Progress/Outcome (Gait Training Goal 1, PT) goal ongoing  -MS                User Key  (r) = Recorded By, (t) = Taken By, (c) = Cosigned By      Initials Name Provider Type    Sofia Candelario, PT Physical Therapist                   Clinical Impression       Row Name 02/18/25 1029          Pain    Pretreatment Pain Rating 4/10  -MS     Posttreatment Pain Rating 4/10  -MS     Pain Location knee  -MS     Pain Side/Orientation right  -MS     Pain Management Interventions exercise or physical activity utilized;positioning techniques utilized  -MS     Response to Pain Interventions activity participation with tolerable pain  -MS       Row Name 02/18/25 1029          Plan of Care Review    Plan of Care Reviewed With patient  -MS     Progress improving  -MS     Outcome Evaluation Patient resting in bed upon PT arrival and agreeable to therapy. Chace for bed mobility, stood with Chace to a RW and ambulated 5' Chace with a RW. L LE Limiting mobility as well as generalized weakness. Continue to plan for SNF upon DC. PT will continue to follow.  -MS       Row Name 02/18/25 1029          Vital Signs    O2 Delivery Pre Treatment supplemental O2  -MS     O2 Delivery Intra Treatment supplemental O2  -MS     O2 Delivery Post Treatment supplemental O2  -MS       Row Name 02/18/25 1029          Positioning and Restraints    Pre-Treatment Position in bed  -MS     Post Treatment Position chair  -MS     In Chair notified nsg;reclined;call light within reach;encouraged to call for assist;exit alarm on  -MS               User Key  (r) = Recorded By, (t) = Taken By, (c) = Cosigned By      Initials Name Provider Type    Sofia Candelario, PT Physical Therapist                   Outcome Measures       Row Name 02/18/25 1031          How much help from another person do you currently need...    Turning from your back to your side while in flat bed without using bedrails? 3  -MS     Moving from lying on back to sitting on the side of a flat bed without bedrails? 3  -MS     Moving to and from a bed  to a chair (including a wheelchair)? 3  -MS     Standing up from a chair using your arms (e.g., wheelchair, bedside chair)? 3  -MS     Climbing 3-5 steps with a railing? 2  -MS     To walk in hospital room? 3  -MS     AM-PAC 6 Clicks Score (PT) 17  -MS     Highest Level of Mobility Goal 5 --> Static standing  -MS               User Key  (r) = Recorded By, (t) = Taken By, (c) = Cosigned By      Initials Name Provider Type    MS Uzma Sofia EMELYN, PT Physical Therapist                                 Physical Therapy Education       Title: PT OT SLP Therapies (In Progress)       Topic: Physical Therapy (In Progress)       Point: Mobility training (Done)       Learning Progress Summary            Patient Acceptance, E,D, VU,NR by MS at 2/15/2025 1635    Acceptance, E, NR by  at 2/11/2025 1323                      Point: Home exercise program (In Progress)       Learning Progress Summary            Patient Acceptance, E, NR by CS at 2/11/2025 1323                                      User Key       Initials Effective Dates Name Provider Type Discipline    MS 06/16/21 -  Jed Mark, PT Physical Therapist PT     09/06/24 -  Shen Gutierrez PT Physical Therapist PT                  PT Recommendation and Plan     Progress: improving  Outcome Evaluation: Patient resting in bed upon PT arrival and agreeable to therapy. Chace for bed mobility, stood with Chace to a RW and ambulated 5' Chace with a RW. L LE Limiting mobility as well as generalized weakness. Continue to plan for SNF upon DC. PT will continue to follow.     Time Calculation:         PT Charges       Row Name 02/18/25 1027             Time Calculation    Start Time 0923  -MS      Stop Time 0941  -MS      Time Calculation (min) 18 min  -MS      PT Received On 02/18/25  -MS      PT - Next Appointment 02/19/25  -MS      PT Goal Re-Cert Due Date 02/25/25  -MS                User Key  (r) = Recorded By, (t) = Taken By, (c) = Cosigned By      Initials Name  Provider Type    MS Sofia Gusman, PT Physical Therapist                  Therapy Charges for Today       Code Description Service Date Service Provider Modifiers Qty    95961589436 HC PT THER PROC EA 15 MIN 2/18/2025 Sofia Gusman, PT GP 1            PT G-Codes  Outcome Measure Options: AM-PAC 6 Clicks Basic Mobility (PT)  AM-PAC 6 Clicks Score (PT): 17  PT Discharge Summary  Anticipated Discharge Disposition (PT): skilled nursing facility    Sofia Gusman, ISABEL  2/18/2025

## 2025-02-18 NOTE — PROGRESS NOTES
" LOS: 8 days     Name: Chrissy Gutierrez  Age: 74 y.o.  Sex: female  :  1950  MRN: [id number]         Primary Care Physician: Jai Steven MD    Subjective   Subjective  This morning the patient seemed open and agreeable to long-term IV antibiotics.  She did not hourly object to this during our discussion today.    Objective   Vital Signs  Temp:  [97 °F (36.1 °C)-98.1 °F (36.7 °C)] 98.1 °F (36.7 °C)  Heart Rate:  [] 83  Resp:  [16-18] 16  BP: (114-121)/(73-78) 121/78  Body mass index is 31.95 kg/m².    Objective:  General Appearance:  Comfortable and in no acute distress.    Vital signs: (most recent): Blood pressure 121/78, pulse 83, temperature 98.1 °F (36.7 °C), temperature source Oral, resp. rate 16, height 170.2 cm (67\"), weight 92.5 kg (204 lb), SpO2 95%.    Lungs:  Normal effort and normal respiratory rate.    Heart: Normal rate.  Regular rhythm.    Abdomen: Abdomen is soft.  Bowel sounds are normal.   There is no abdominal tenderness.     Extremities: There is no dependent edema or local swelling.    Neurological: Patient is alert and oriented to person, place and time.    Skin:  Warm and dry.                Results Review:       I reviewed the patient's new clinical results.    Results from last 7 days   Lab Units 25  0631 25  0908 02/16/25  0417 02/15/25  0531 02/14/25  0427 02/13/25  0416 02/12/25  0618   WBC 10*3/mm3 13.47* 12.74* 11.70* 10.16 15.00* 17.05* 18.00*   HEMOGLOBIN g/dL 11.1* 10.9* 10.6* 10.0* 9.7* 9.9* 9.4*   PLATELETS 10*3/mm3 577* 575* 483* 485* 430 395 387     Results from last 7 days   Lab Units 25  0631 25  0909 25  0417 02/15/25  0531 25  0427 25  0416 25  0618   SODIUM mmol/L 138 138 139 137 137 139 141   POTASSIUM mmol/L 4.6 4.2 4.8 4.3 4.3 4.3 4.2   CHLORIDE mmol/L 100 99 102 102 104 104 108*   CO2 mmol/L 23.1 24.7 22.9 22.0 19.0* 18.0* 18.0*   BUN mg/dL 71* 65* 66* 55* 49* 37* 31*   CREATININE mg/dL 1.37* 1.31* " 1.49* 1.15* 1.43* 1.40* 1.25*   CALCIUM mg/dL 9.2 9.1 8.7 8.6 8.8 9.0 8.8   GLUCOSE mg/dL 188* 223* 240* 137* 136* 202* 80     Results from last 7 days   Lab Units 02/15/25  0530   INR  1.16*             Scheduled Meds:   [Held by provider] aspirin, 81 mg, Oral, Daily  carvedilol, 12.5 mg, Oral, BID With Meals  cefTRIAXone, 2,000 mg, Intravenous, Q24H  empagliflozin, 25 mg, Oral, Daily  [Held by provider] enoxaparin, 40 mg, Subcutaneous, Q24H  furosemide, 20 mg, Oral, Daily  glipizide, 5 mg, Oral, QAM AC  insulin glargine, 10 Units, Subcutaneous, Daily  insulin lispro, 2-9 Units, Subcutaneous, 4x Daily AC & at Bedtime  ipratropium-albuterol, 3 mL, Nebulization, Q6H While Awake - RT  predniSONE, 40 mg, Oral, Daily With Breakfast  sodium bicarbonate, 650 mg, Oral, TID  sodium chloride, 10 mL, Intravenous, Q12H      PRN Meds:     senna-docusate sodium **AND** polyethylene glycol **AND** bisacodyl **AND** bisacodyl    calcium carbonate    dextrose    dextrose    glucagon (human recombinant)    ipratropium-albuterol    methocarbamol    nitroglycerin    ondansetron ODT **OR** ondansetron    oxyCODONE    sodium chloride    sodium chloride  Continuous Infusions:       Assessment & Plan   Active Hospital Problems    Diagnosis  POA    **Bacterial pneumonia [J15.9]  Yes    Sepsis, unspecified organism [A41.9]  Yes    Influenza A [J10.1]  Yes    Acute pain of left knee [M25.562]  Yes    Type 2 diabetes mellitus with hyperglycemia, with long-term current use of insulin [E11.65, Z79.4]  Not Applicable    CKD (chronic kidney disease) stage 3, GFR 30-59 ml/min [N18.30]  Yes    Acute respiratory failure with hypoxia [J96.01]  Yes      Resolved Hospital Problems   No resolved problems to display.       Assessment & Plan    1.  Acute respiratory failure with hypoxia, bacterial pneumonia, recent history of flu infection, superimposed bacterial pneumonia. Treated with antibiotic including Rocephin and doxycycline.  No further need of  antibiotics for pneumonia but she remains on these for possible septic arthritis of the knee as outlined below.     2.  Acute pain in the left knee, S/P OR for debridement and washout.  ID recommends a 4-week course of IV antibiotics.  Patient seems more agreeable this today.  Having trouble finding a facility that will take her.  She is also on prednisone.  Tomorrow will be her fifth day of 40 mg and then I will begin to taper.     3.  Acute on chronic kidney injury, renal function has improved, appears to be close to baseline.  Avoid nephrotoxic medications.  Nephrology on board and following.     4.  Diabetes mellitus.  Blood sugars overall reasonably controlled.  Continue present regimen.     5.  Transaminitis.  Gastroenterology suspected muscle related injury.  Will repeat labs tomorrow.     6.  CODE STATUS is full code.      Await discharge planning      Expected Discharge Date: 2/19/2025; Expected Discharge Time:      Silvestre Pierre MD  [facility]  02/18/25  09:58 EST

## 2025-02-18 NOTE — PROGRESS NOTES
"  Infectious Diseases Progress Note    Roxanne Duncan MD     [facility]  Los: 8 days  Patient Identification:  Name: Chrissy Gutierrez  Age: 74 y.o.  Sex: female  :  1950  MRN: [id number]         Primary Care Physician: Jai Steven MD        Subjective: Overall gradually getting better.  Upset about the fact that PureWick urinary system is discontinued and she thinks that she is at risk of incontinence as her mobility is not completely restored and she was going to urinate upon herself.  Denies any fever and chills.  Interval History: See consultation note.    Objective:    Scheduled Meds:[Held by provider] aspirin, 81 mg, Oral, Daily  carvedilol, 12.5 mg, Oral, BID With Meals  cefTRIAXone, 2,000 mg, Intravenous, Q24H  empagliflozin, 25 mg, Oral, Daily  [Held by provider] enoxaparin, 40 mg, Subcutaneous, Q24H  furosemide, 20 mg, Oral, Daily  glipizide, 5 mg, Oral, QAM AC  insulin glargine, 10 Units, Subcutaneous, Daily  insulin lispro, 2-9 Units, Subcutaneous, 4x Daily AC & at Bedtime  ipratropium-albuterol, 3 mL, Nebulization, Q6H While Awake - RT  predniSONE, 40 mg, Oral, Daily With Breakfast  sodium bicarbonate, 650 mg, Oral, TID  sodium chloride, 10 mL, Intravenous, Q12H      Continuous Infusions:     Vital signs in last 24 hours:  Temp:  [97 °F (36.1 °C)-98.1 °F (36.7 °C)] 98.1 °F (36.7 °C)  Heart Rate:  [] 83  Resp:  [16-18] 16  BP: (114-121)/(73-78) 121/78    Intake/Output:    Intake/Output Summary (Last 24 hours) at 2025 1030  Last data filed at 2025 0900  Gross per 24 hour   Intake 720 ml   Output 2750 ml   Net -2030 ml       Exam:  /78 (BP Location: Right arm, Patient Position: Sitting)   Pulse 83   Temp 98.1 °F (36.7 °C) (Oral)   Resp 16   Ht 170.2 cm (67\")   Wt 92.5 kg (204 lb)   SpO2 95%   BMI 31.95 kg/m²   Patient is examined using the personal protective equipment as per guidelines from infection control for this particular patient as enacted.  " Hand washing was performed before and after patient interaction.  General Appearance:    Alert, cooperative, no distress, AAOx3                          Head:    Normocephalic, without obvious abnormality, atraumatic                           Eyes:    PERRL, conjunctivae/corneas clear, EOM's intact, both eyes                         Throat:   Lips, tongue, gums normal; oral mucosa pink and moist                           Neck:   Supple, symmetrical, trachea midline, no JVD                         Lungs:    Clear to auscultation bilaterally, respirations unlabored                 Chest Wall:    No tenderness or deformity                          Heart:  S1-S2 regular                  Abdomen:   Soft nontender                 Extremities: Left knee and lower extremities wrapped in the Ace wrap.                        Pulses:   Pulses palpable in all extremities                            Skin:   Skin is warm and dry,  no rashes or palpable lesions                  Neurologic: Alert and oriented x 3       Data Review:    I reviewed the patient's new clinical results.  Results from last 7 days   Lab Units 02/18/25  0631 02/17/25  0908 02/16/25  0417 02/15/25  0531 02/14/25  0427 02/13/25  0416 02/12/25  0618   WBC 10*3/mm3 13.47* 12.74* 11.70* 10.16 15.00* 17.05* 18.00*   HEMOGLOBIN g/dL 11.1* 10.9* 10.6* 10.0* 9.7* 9.9* 9.4*   PLATELETS 10*3/mm3 577* 575* 483* 485* 430 395 387     Results from last 7 days   Lab Units 02/18/25  0631 02/17/25  0909 02/16/25  0417 02/15/25  0531 02/14/25  0427 02/13/25  0416 02/12/25  0618   SODIUM mmol/L 138 138 139 137 137 139 141   POTASSIUM mmol/L 4.6 4.2 4.8 4.3 4.3 4.3 4.2   CHLORIDE mmol/L 100 99 102 102 104 104 108*   CO2 mmol/L 23.1 24.7 22.9 22.0 19.0* 18.0* 18.0*   BUN mg/dL 71* 65* 66* 55* 49* 37* 31*   CREATININE mg/dL 1.37* 1.31* 1.49* 1.15* 1.43* 1.40* 1.25*   CALCIUM mg/dL 9.2 9.1 8.7 8.6 8.8 9.0 8.8   GLUCOSE mg/dL 188* 223* 240* 137* 136* 202* 80     Microbiology Results  (last 10 days)       Procedure Component Value - Date/Time    Tissue / Bone Culture - Tissue, Knee, Left [[id number]] Collected: 02/12/25 1341    Lab Status: Final result Specimen: Tissue from Knee, Left Updated: 02/15/25 0726     Tissue Culture No growth at 3 days     Gram Stain No WBCs or organisms seen    Anaerobic Culture - Synovial Fluid, Knee, Left [[id number]]  (Normal) Collected: 02/12/25 1327    Lab Status: Final result Specimen: Synovial Fluid from Knee, Left Updated: 02/17/25 0710     Anaerobic Culture No anaerobes isolated at 5 days    Body Fluid Culture - Synovial Fluid, Knee, Left [[id number]] Collected: 02/12/25 1327    Lab Status: Final result Specimen: Synovial Fluid from Knee, Left Updated: 02/17/25 0742     Body Fluid Culture No growth at 5 days     Gram Stain Many (4+) WBCs seen      No organisms seen    Body Fluid Culture - Synovial Fluid, Knee, Left [[id number]] Collected: 02/11/25 1745    Lab Status: Final result Specimen: Synovial Fluid from Knee, Left Updated: 02/16/25 0620     Body Fluid Culture No growth at 5 days     Gram Stain Moderate (3+) WBCs seen      No organisms seen    Anaerobic Culture - Synovial Fluid, Knee, Left [[id number]]  (Normal) Collected: 02/11/25 1745    Lab Status: Final result Specimen: Synovial Fluid from Knee, Left Updated: 02/16/25 0701     Anaerobic Culture No anaerobes isolated at 5 days    Urine Culture - Urine, Urine, Clean Catch [[id number]]  (Abnormal)  (Susceptibility) Collected: 02/10/25 0502    Lab Status: Final result Specimen: Urine, Clean Catch Updated: 02/12/25 0903     Urine Culture >100,000 CFU/mL Escherichia coli    Narrative:      Colonization of the urinary tract without infection is common. Treatment is discouraged unless the patient is symptomatic, pregnant, or undergoing an invasive urologic procedure.    Susceptibility        Escherichia coli      TICO      Amoxicillin + Clavulanate Susceptible      Ampicillin Susceptible      Ampicillin + Sulbactam  Susceptible      Cefazolin (Urine) Susceptible      Cefepime Susceptible      Ceftazidime Susceptible      Ceftriaxone Susceptible      Gentamicin Susceptible      Levofloxacin Susceptible      Nitrofurantoin Susceptible      Piperacillin + Tazobactam Susceptible      Trimethoprim + Sulfamethoxazole Susceptible                           Blood Culture - Blood, Arm, Left [[id number]]  (Normal) Collected: 02/10/25 0501    Lab Status: Final result Specimen: Blood from Arm, Left Updated: 02/15/25 0515     Blood Culture No growth at 5 days    Blood Culture - Blood, Arm, Left [[id number]]  (Normal) Collected: 02/10/25 0500    Lab Status: Final result Specimen: Blood from Arm, Left Updated: 02/15/25 0515     Blood Culture No growth at 5 days    Respiratory Panel PCR w/COVID-19(SARS-CoV-2) [facility] In-House, NP Swab in UTM/VTM, 2 HR TAT - Swab, Nasopharynx [[id number]]  (Abnormal) Collected: 02/10/25 0401    Lab Status: Final result Specimen: Swab from Nasopharynx Updated: 02/10/25 0824     ADENOVIRUS, PCR Not Detected     Coronavirus 229E Not Detected     Coronavirus HKU1 Not Detected     Coronavirus NL63 Not Detected     Coronavirus OC43 Not Detected     COVID19 Not Detected     Human Metapneumovirus Not Detected     Human Rhinovirus/Enterovirus Not Detected     Influenza A PCR Equivocal     Comment: Appended report. These results have been appended to a previously preliminary verified report.        Influenza B PCR Not Detected     Parainfluenza Virus 1 Not Detected     Parainfluenza Virus 2 Not Detected     Parainfluenza Virus 3 Not Detected     Parainfluenza Virus 4 Not Detected     RSV, PCR Not Detected     Bordetella pertussis pcr Not Detected     Bordetella parapertussis PCR Not Detected     Chlamydophila pneumoniae PCR Not Detected     Mycoplasma pneumo by PCR Not Detected    Narrative:      In the setting of a positive respiratory panel with a viral infection PLUS a negative procalcitonin without other  underlying concern for bacterial infection, consider observing off antibiotics or discontinuation of antibiotics and continue supportive care. If the respiratory panel is positive for atypical bacterial infection (Bordetella pertussis, Chlamydophila pneumoniae, or Mycoplasma pneumoniae), consider antibiotic de-escalation to target atypical bacterial infection.              Assessment:    Bacterial pneumonia    Sepsis, unspecified organism    Influenza A    Acute pain of left knee    Type 2 diabetes mellitus with hyperglycemia, with long-term current use of insulin    CKD (chronic kidney disease) stage 3, GFR 30-59 ml/min    Acute respiratory failure with hypoxia    74-year-old female with  1-painful tender left knee with decline in function status post joint aspiration followed by I&D and washout in the setting of respiratory tract infection with influenza A and abnormal urinalysis consistent with UTI-this presentation of tender painful knee with worsening function and abnormal joint fluid analysis with crystal for uric acid positive could very well be due to combination of multiple pathologies happening together:             -Acute gouty arthritis with             -Secondary septic arthritis due to hematogenous seeding from the lung or urine versus             -Progression of osteoarthritis.  2-acute influenza A with secondary bacterial pneumonia  3-abnormal urinalysis with urine culture without any specific urinary symptoms  4-acute on chronic renal insufficiency with prior history of left-sided hydronephrosis and history of right nephrectomy  5-osteoarthritis of the right shoulder and wrist without any fracture  6-hypertension  7-peripheral neuropathy  8-history of renal cell carcinoma-history of right nephrectomy  9-type 2 diabetes.     Recommendations/Discussions:  See my discussion and recommendations on 2/13/2025 and 2/14/2025.  Management of concomitant gout with short course of oral steroid per primary team  and orthopedic surgery service.  Patient will need out of hospital follow-up with orthopedic surgery service as per their recommendations  See discussion in the additional progress note on 2/14/2025 for alternate oral antibiotic therapy as a second line option which is better than taking no treatment as she is still very reluctant for IV treatment as recommended:  Patient is very concerned about her ability to get IV antibiotics and was hoping that she would get oral antibiotics for her infection.  I explained the patient about ideal treatment for this situation as recommended in my progress note and orders for the case management.  If patient after being explained the risks and benefits of her decision making after using oral antibiotics over IV antibiotics makes the decision to go with oral antibiotics then she could be switched to oral Keflex 500 mg every 8 hours for 28 days from 2/11/2025.  With ongoing continued management of gout  Patient will need close follow-up with the primary care provider with once weekly lab work to monitor antibiotic toxicity and side effects with instructions to reach out to Dr. Duncan on a once a week basis to go over review of system to monitor antibiotic toxicity and side effects and have a primary care provider reach out to me if there is need for discussion of further management of her left knee infection.  Ideal treatment for this presentation is discussed will be 4 weeks of IV Rocephin from last surgical intervention on 2/11/2025.  Following orders are written for case management:   Please arrange for 4 weeks of IV Rocephin at 2 g 24 hours starting 2/11/2025.  Check weekly CBC CMP and CRP and call abnormal results at [phone].  Remove midline/PICC line after completion of antibiotic therapy  Please educate patient to reach out to Dr. Duncan on once a week basis at [phone] to go over review of system to monitor antibiotic toxicity and effectiveness of  treatment.  Diagnosis:  1-painful tender left knee with decline in function status post joint aspiration followed by I&D and washout in the setting of respiratory tract infection with influenza A and abnormal urinalysis consistent with UTI-this presentation of tender painful knee with worsening function and abnormal joint fluid analysis with crystal for uric acid positive could very well be due to combination of multiple pathologies happening together:             -Acute gouty arthritis with             -Secondary septic arthritis due to hematogenous seeding from the lung or urine versus             -Progression of osteoarthritis.  2-acute influenza A with secondary bacterial pneumonia  3-abnormal urinalysis with urine culture without any specific urinary symptoms  Roxanne Duncan MD  2/18/2025  10:30 EST    Parts of this note may be an electronic transcription/translation of spoken language to printed text using the Dragon dictation system.

## 2025-02-18 NOTE — PROGRESS NOTES
[facility]     Progress Note    Patient Name: Chrissy Gutierrez  : 1950  MRN: [id number]  Primary Care Physician:  Jai Steven MD  Date of admission: 2/10/2025    Subjective   Subjective     Chief Complaint: oral on ckd     History of Present Illness  Patient with oral on ckd III admitted with flu    Review of Systems  She is feeling ok this am    Objective   Objective     Vitals:   Temp:  [97 °F (36.1 °C)-98.1 °F (36.7 °C)] 98.1 °F (36.7 °C)  Heart Rate:  [] 83  Resp:  [16-18] 16  BP: (114-121)/(73-78) 121/78  Flow (L/min) (Oxygen Therapy):  [3] 3    Physical Exam   General Appearance:  In no acute distress.    HEENT: Normal HEENT exam.     Extremities: .  There is no deformity, effusion or dependent edema.    Neurological: Patient is alert     Result Review    Result Review:  I have personally reviewed the results from the time of this admission to 2025 10:30 EST and agree with these findings:  [x]  Laboratory list / accordion  [x]  Microbiology  []  Radiology  []  EKG/Telemetry   []  Cardiology/Vascular   []  Pathology  []  Old records  []  Other:  Most notable findings include:       Assessment & Plan   Assessment / Plan     Brief Patient Summary:  Chrissy Gutierrez is a 74 y.o. female who has influenza and ckd III    Active Hospital Problems:  Active Hospital Problems    Diagnosis     **Bacterial pneumonia     Sepsis, unspecified organism     Influenza A     Acute pain of left knee     Type 2 diabetes mellitus with hyperglycemia, with long-term current use of insulin     CKD (chronic kidney disease) stage 3, GFR 30-59 ml/min     Acute respiratory failure with hypoxia      Plan:   CKD stage IIIa, stable, near baseline    Metabolic acidosis    Bacterial pneumonia, on IV/p.o. antibiotics    Sepsis, unspecified organism    Influenza A  Chronic diastolic CHF, back on oral diuretics today    Acute pain of left knee    Type 2 diabetes mellitus with hyperglycemia, with long-term current use of  insulin    CKD (chronic kidney disease) stage 3, GFR 30-59 ml/min    Acute respiratory failure with hypoxia  Hyperphosphatemia   Azotemia- worse. No uremia    Renal functions table with Cr of 1.3. at her baseline.   Azotemia worse today, likely due to steroids.   Metabolic acidosis stable. Continue PO bicarb.   Continue with current diuretic regimen   Phos a bit elevated today. Monitor for now  Will follow       Antionette Patel MD   [facility].

## 2025-02-18 NOTE — PROGRESS NOTES
Orthopaedic Progress Note     Pain well controlled. Had some bloody drainage since initial dressing change on pod 3. Dressing with bloody drainage likely post operative hematoma. Has been on lovenox as well as aspirin for DVT ppx.     NAD  Alert  Respirations are nonlabored   LLE  Dressing changed today with moderate saturation bloody from the superolateral incision   Able to DF and PF at the ankle weakly   Knee ROM 0 -45   Sensation is intact distally   Foot is wwp   Compartments are soft and compressible     Ranging all other joints as tolerated with no pain right knee stiff with ROM 0-90     74 year old female with suspected left knee septic arthritis in the setting of bacterial pneumonia and UTI   POD 5 left knee irrigation and debridement   -discussed with her again that her cultures have remained negative and ID recommends 4 weeks course of IV Rocephin.   -WBAT BLE   -encourage ROM and mobilization  -wound check tmrw in the morning to ensure that the drainage at the superolateral incision site has lessened   -to chair if able  -pain control   -ice and elevation   -PT   -hold anticoagulation for a day due to bloody drainage   -restart dvt ppx 81 mg ASA BID 2/19/25 remain on until fully mobilizing    -please call/chat  with any questions or concerns.    Arvind Ibarra MD   [facility]   [phone] - [place] [facility]  [phone] - [place] Office

## 2025-02-18 NOTE — PLAN OF CARE
Goal Outcome Evaluation:VSS, up to chair for meals. Purwick d/c'd. Pt using BSC with assist x1 and walker. Knee dressing CDI. Insulin adjusted due to high blood sugar levels. Pending placement in SNF. Call bell in reach, chair alarm set.

## 2025-02-18 NOTE — PROGRESS NOTES
Name: Chrissy Gutierrez ADMIT: 2/10/2025   : 1950  PCP: Jai Steven MD    MRN: [id number] LOS: 7 days   AGE/SEX: 74 y.o. female  ROOM: [facility]     Subjective   Subjective   Patient seen at bedside, no acute issues from overnight have been reported.       Objective   Objective   Vital Signs  Temp:  [97.1 °F (36.2 °C)-97.9 °F (36.6 °C)] 97.1 °F (36.2 °C)  Heart Rate:  [] 103  Resp:  [16-20] 18  BP: (108-114)/(64-73) 114/73  SpO2:  [91 %-99 %] 92 %  on  Flow (L/min) (Oxygen Therapy):  [2-3] 3;   Device (Oxygen Therapy): nasal cannula  Body mass index is 31.95 kg/m².  Physical Exam  General, awake and alert.  Head and ENT, normocephalic and atraumatic.  Lungs, symmetric expansion, equal air entry bilaterally.  Heart, regular rate and rhythm.  Abdomen, soft and nontender.  Extremities, no clubbing or cyanosis.  Neuro, no focal deficits.  Skin: Warm and no rash.  Psych, normal mood and affect.  Musculoskeletal, joint examination is grossly normal.     Copied text material from yesterday's note has been reviewed for appropriate changes and remains accurate as of 25.        Results Review     I reviewed the patient's new clinical results.  Results from last 7 days   Lab Units 25  0908 02/16/25  0417 02/15/25  0531 02/14/25  0427   WBC 10*3/mm3 12.74* 11.70* 10.16 15.00*   HEMOGLOBIN g/dL 10.9* 10.6* 10.0* 9.7*   PLATELETS 10*3/mm3 575* 483* 485* 430     Results from last 7 days   Lab Units 25  0909 02/16/25  0417 02/15/25  0531 02/14/25  0427   SODIUM mmol/L 138 139 137 137   POTASSIUM mmol/L 4.2 4.8 4.3 4.3   CHLORIDE mmol/L 99 102 102 104   CO2 mmol/L 24.7 22.9 22.0 19.0*   BUN mg/dL 65* 66* 55* 49*   CREATININE mg/dL 1.31* 1.49* 1.15* 1.43*   GLUCOSE mg/dL 223* 240* 137* 136*   EGFR mL/min/1.73 42.8* 36.7* 50.1* 38.6*     Results from last 7 days   Lab Units 25  0909 25  0417 02/15/25  0531 25  0427 25  0416 25  0618   ALBUMIN g/dL 3.2* 2.9* 2.9*  2.6* 2.7* 2.8*   BILIRUBIN mg/dL  --  <0.2  --  0.2 0.2 0.3   ALK PHOS U/L  --  98  --  111 100 80   AST (SGOT) U/L  --  37*  --  535* 870* 209*   ALT (SGPT) U/L  --  128*  --  331* 466* 169*     Results from last 7 days   Lab Units 02/17/25  0909 02/16/25  0417 02/15/25  0531 02/14/25  0427   CALCIUM mg/dL 9.1 8.7 8.6 8.8   ALBUMIN g/dL 3.2* 2.9* 2.9* 2.6*   PHOSPHORUS mg/dL 4.5 4.3 4.1  --        Glucose   Date/Time Value Ref Range Status   02/17/2025 1637 332 (H) 70 - 130 mg/dL Final   02/17/2025 1148 212 (H) 70 - 130 mg/dL Final   02/17/2025 0747 145 (H) 70 - 130 mg/dL Final   02/16/2025 2008 389 (H) 70 - 130 mg/dL Final   02/16/2025 1620 310 (H) 70 - 130 mg/dL Final   02/16/2025 1100 263 (H) 70 - 130 mg/dL Final   02/16/2025 0714 202 (H) 70 - 130 mg/dL Final       No radiology results for the last day    I have personally reviewed all medications:  Scheduled Medications  aspirin, 81 mg, Oral, Daily  carvedilol, 12.5 mg, Oral, BID With Meals  cefTRIAXone, 2,000 mg, Intravenous, Q24H  empagliflozin, 25 mg, Oral, Daily  enoxaparin, 40 mg, Subcutaneous, Q24H  furosemide, 20 mg, Oral, Daily  glipizide, 5 mg, Oral, QAM AC  insulin glargine, 10 Units, Subcutaneous, Daily  insulin lispro, 2-9 Units, Subcutaneous, 4x Daily AC & at Bedtime  ipratropium-albuterol, 3 mL, Nebulization, Q6H While Awake - RT  predniSONE, 40 mg, Oral, Daily With Breakfast  sodium bicarbonate, 650 mg, Oral, TID  sodium chloride, 10 mL, Intravenous, Q12H    Infusions   Diet  Diet: Diabetic; Consistent Carbohydrate; Fluid Consistency: Thin (IDDSI 0)    I have personally reviewed:  [x]  Laboratory   [x]  Microbiology   [x]  Radiology   [x]  EKG/Telemetry  [x]  Cardiology/Vascular   []  Pathology    []  Records       Assessment/Plan     Active Hospital Problems    Diagnosis  POA    **Bacterial pneumonia [J15.9]  Yes    Sepsis, unspecified organism [A41.9]  Yes    Influenza A [J10.1]  Yes    Acute pain of left knee [M25.562]  Yes    Type 2 diabetes  mellitus with hyperglycemia, with long-term current use of insulin [E11.65, Z79.4]  Not Applicable    CKD (chronic kidney disease) stage 3, GFR 30-59 ml/min [N18.30]  Yes    Acute respiratory failure with hypoxia [J96.01]  Yes      Resolved Hospital Problems   No resolved problems to display.       74 y.o. female admitted with Bacterial pneumonia.    Assessment and plan  1.  Acute respiratory failure with hypoxia, bacterial pneumonia, recent history of flu infection, superimposed bacterial pneumonia, treated with antibiotics, continue Rocephin, completed doxycycline for atypical coverage.     2.  Acute pain in the left knee, status post orthopedics, suspected septic arthritis, S/P OR for debridement and washout.  Case discussed with infectious disease, based on patient's clinical presentation, it would be reasonable to complete 4 weeks of IV antibiotics presuming it is septic arthritis.  Patient reluctant to go on IV antibiotics, alternate plan for oral antibiotics have been discussed with ID.      3.  Acute on chronic kidney injury, renal function has improved, appears to be close to baseline.  Avoid nephrotoxic medications.  Nephrology on board and following.     4.  Diabetes mellitus, continue Accu-Cheks and sliding scale insulin coverage.     5.  Transaminitis, etiology unclear, consulted gastroenterology, appreciate input.     6.  CODE STATUS is full code.  Further plans based on hospital course.        Expected Discharge Date: 2/19/2025; Expected Discharge Time:       Raleigh Boateng MD  [facility]  02/17/25  19:16 EST

## 2025-02-19 LAB
ALBUMIN SERPL-MCNC: 2.9 G/DL (ref 3.5–5.2)
ALBUMIN/GLOB SERPL: 0.6 G/DL
ALP SERPL-CCNC: 92 U/L (ref 39–117)
ALT SERPL W P-5'-P-CCNC: 73 U/L (ref 1–33)
ANION GAP SERPL CALCULATED.3IONS-SCNC: 13.1 MMOL/L (ref 5–15)
AST SERPL-CCNC: 19 U/L (ref 1–32)
BILIRUB SERPL-MCNC: <0.2 MG/DL (ref 0–1.2)
BUN SERPL-MCNC: 74 MG/DL (ref 8–23)
BUN/CREAT SERPL: 48.4 (ref 7–25)
CALCIUM SPEC-SCNC: 9.5 MG/DL (ref 8.6–10.5)
CHLORIDE SERPL-SCNC: 100 MMOL/L (ref 98–107)
CO2 SERPL-SCNC: 24.9 MMOL/L (ref 22–29)
CREAT SERPL-MCNC: 1.53 MG/DL (ref 0.57–1)
DEPRECATED RDW RBC AUTO: 40.7 FL (ref 37–54)
EGFRCR SERPLBLD CKD-EPI 2021: 35.6 ML/MIN/1.73
ERYTHROCYTE [DISTWIDTH] IN BLOOD BY AUTOMATED COUNT: 12.5 % (ref 12.3–15.4)
GLOBULIN UR ELPH-MCNC: 4.8 GM/DL
GLUCOSE BLDC GLUCOMTR-MCNC: 135 MG/DL (ref 70–130)
GLUCOSE BLDC GLUCOMTR-MCNC: 292 MG/DL (ref 70–130)
GLUCOSE BLDC GLUCOMTR-MCNC: 299 MG/DL (ref 70–130)
GLUCOSE BLDC GLUCOMTR-MCNC: 322 MG/DL (ref 70–130)
GLUCOSE BLDC GLUCOMTR-MCNC: 396 MG/DL (ref 70–130)
GLUCOSE SERPL-MCNC: 158 MG/DL (ref 65–99)
HCT VFR BLD AUTO: 34.1 % (ref 34–46.6)
HGB BLD-MCNC: 11.2 G/DL (ref 12–15.9)
MCH RBC QN AUTO: 29.9 PG (ref 26.6–33)
MCHC RBC AUTO-ENTMCNC: 32.8 G/DL (ref 31.5–35.7)
MCV RBC AUTO: 90.9 FL (ref 79–97)
PHOSPHATE SERPL-MCNC: 4.6 MG/DL (ref 2.5–4.5)
PLATELET # BLD AUTO: 601 10*3/MM3 (ref 140–450)
PMV BLD AUTO: 9.2 FL (ref 6–12)
POTASSIUM SERPL-SCNC: 4.5 MMOL/L (ref 3.5–5.2)
PROT SERPL-MCNC: 7.7 G/DL (ref 6–8.5)
RBC # BLD AUTO: 3.75 10*6/MM3 (ref 3.77–5.28)
SODIUM SERPL-SCNC: 138 MMOL/L (ref 136–145)
WBC NRBC COR # BLD AUTO: 13.74 10*3/MM3 (ref 3.4–10.8)

## 2025-02-19 PROCEDURE — 94799 UNLISTED PULMONARY SVC/PX: CPT

## 2025-02-19 PROCEDURE — 85027 COMPLETE CBC AUTOMATED: CPT | Performed by: INTERNAL MEDICINE

## 2025-02-19 PROCEDURE — 63710000001 INSULIN LISPRO (HUMAN) PER 5 UNITS: Performed by: INTERNAL MEDICINE

## 2025-02-19 PROCEDURE — 80053 COMPREHEN METABOLIC PANEL: CPT | Performed by: INTERNAL MEDICINE

## 2025-02-19 PROCEDURE — 82948 REAGENT STRIP/BLOOD GLUCOSE: CPT

## 2025-02-19 PROCEDURE — 63710000001 INSULIN GLARGINE PER 5 UNITS: Performed by: INTERNAL MEDICINE

## 2025-02-19 PROCEDURE — 25010000002 CEFTRIAXONE PER 250 MG: Performed by: INTERNAL MEDICINE

## 2025-02-19 PROCEDURE — 63710000001 PREDNISONE PER 1 MG: Performed by: INTERNAL MEDICINE

## 2025-02-19 PROCEDURE — 94760 N-INVAS EAR/PLS OXIMETRY 1: CPT

## 2025-02-19 PROCEDURE — 63710000001 INSULIN GLARGINE PER 5 UNITS: Performed by: ORTHOPAEDIC SURGERY

## 2025-02-19 PROCEDURE — 84100 ASSAY OF PHOSPHORUS: CPT | Performed by: INTERNAL MEDICINE

## 2025-02-19 PROCEDURE — 94664 DEMO&/EVAL PT USE INHALER: CPT

## 2025-02-19 RX ORDER — OXYCODONE HYDROCHLORIDE 5 MG/1
5 TABLET ORAL EVERY 8 HOURS PRN
Status: DISPENSED | OUTPATIENT
Start: 2025-02-19 | End: 2025-02-24

## 2025-02-19 RX ADMIN — EMPAGLIFLOZIN 25 MG: 25 TABLET, FILM COATED ORAL at 08:06

## 2025-02-19 RX ADMIN — INSULIN GLARGINE 15 UNITS: 100 INJECTION, SOLUTION SUBCUTANEOUS at 21:05

## 2025-02-19 RX ADMIN — Medication 10 ML: at 08:06

## 2025-02-19 RX ADMIN — SODIUM BICARBONATE 650 MG TABLET 650 MG: at 08:06

## 2025-02-19 RX ADMIN — IPRATROPIUM BROMIDE AND ALBUTEROL SULFATE 3 ML: 2.5; .5 SOLUTION RESPIRATORY (INHALATION) at 12:39

## 2025-02-19 RX ADMIN — ASPIRIN 81 MG: 81 TABLET, COATED ORAL at 08:06

## 2025-02-19 RX ADMIN — CARVEDILOL 12.5 MG: 12.5 TABLET, FILM COATED ORAL at 16:26

## 2025-02-19 RX ADMIN — INSULIN GLARGINE 10 UNITS: 100 INJECTION, SOLUTION SUBCUTANEOUS at 08:06

## 2025-02-19 RX ADMIN — CARVEDILOL 12.5 MG: 12.5 TABLET, FILM COATED ORAL at 08:06

## 2025-02-19 RX ADMIN — INSULIN LISPRO 8 UNITS: 100 INJECTION, SOLUTION INTRAVENOUS; SUBCUTANEOUS at 16:26

## 2025-02-19 RX ADMIN — INSULIN LISPRO 10 UNITS: 100 INJECTION, SOLUTION INTRAVENOUS; SUBCUTANEOUS at 11:49

## 2025-02-19 RX ADMIN — INSULIN LISPRO 12 UNITS: 100 INJECTION, SOLUTION INTRAVENOUS; SUBCUTANEOUS at 21:06

## 2025-02-19 RX ADMIN — ASPIRIN 81 MG: 81 TABLET, COATED ORAL at 21:06

## 2025-02-19 RX ADMIN — IPRATROPIUM BROMIDE AND ALBUTEROL SULFATE 3 ML: 2.5; .5 SOLUTION RESPIRATORY (INHALATION) at 07:09

## 2025-02-19 RX ADMIN — SODIUM BICARBONATE 650 MG TABLET 650 MG: at 21:06

## 2025-02-19 RX ADMIN — SODIUM BICARBONATE 650 MG TABLET 650 MG: at 16:27

## 2025-02-19 RX ADMIN — FUROSEMIDE 20 MG: 20 TABLET ORAL at 08:06

## 2025-02-19 RX ADMIN — OXYCODONE HYDROCHLORIDE 5 MG: 5 TABLET ORAL at 08:45

## 2025-02-19 RX ADMIN — IPRATROPIUM BROMIDE AND ALBUTEROL SULFATE 3 ML: 2.5; .5 SOLUTION RESPIRATORY (INHALATION) at 21:30

## 2025-02-19 RX ADMIN — Medication 10 ML: at 21:06

## 2025-02-19 RX ADMIN — METHOCARBAMOL TABLETS 500 MG: 500 TABLET, COATED ORAL at 21:06

## 2025-02-19 RX ADMIN — GLIPIZIDE 5 MG: 5 TABLET ORAL at 08:06

## 2025-02-19 RX ADMIN — PREDNISONE 40 MG: 20 TABLET ORAL at 08:05

## 2025-02-19 RX ADMIN — CEFTRIAXONE 2000 MG: 2 INJECTION, POWDER, FOR SOLUTION INTRAMUSCULAR; INTRAVENOUS at 08:05

## 2025-02-19 NOTE — PROGRESS NOTES
" LOS: 9 days     Name: Chrissy Gutierrez  Age: 74 y.o.  Sex: female  :  1950  MRN: [id number]         Primary Care Physician: Jai Steven MD    Subjective   Subjective  No new complaints or acute overnight events    Objective   Vital Signs  Temp:  [96.7 °F (35.9 °C)-98.1 °F (36.7 °C)] 97 °F (36.1 °C)  Heart Rate:  [75-95] 81  Resp:  [16-18] 16  BP: (111-129)/(66-87) 114/66  Body mass index is 31.95 kg/m².    Objective:  General Appearance:  Comfortable and in no acute distress.    Vital signs: (most recent): Blood pressure 114/66, pulse 81, temperature 97 °F (36.1 °C), temperature source Oral, resp. rate 16, height 170.2 cm (67\"), weight 92.5 kg (204 lb), SpO2 94%.    Lungs:  Normal effort and normal respiratory rate.  She is not in respiratory distress.  There are decreased breath sounds.    Heart: Normal rate.  Regular rhythm.    Abdomen: Abdomen is soft.  Bowel sounds are normal.   There is no abdominal tenderness.     Extremities: There is no dependent edema or local swelling.    Neurological: Patient is alert and oriented to person, place and time.    Skin:  Warm and dry.                Results Review:       I reviewed the patient's new clinical results.    Results from last 7 days   Lab Units 25  04425  0631 25  0908 25  0417 02/15/25  0531 25  0427 25  0416   WBC 10*3/mm3 13.74* 13.47* 12.74* 11.70* 10.16 15.00* 17.05*   HEMOGLOBIN g/dL 11.2* 11.1* 10.9* 10.6* 10.0* 9.7* 9.9*   PLATELETS 10*3/mm3 601* 577* 575* 483* 485* 430 395     Results from last 7 days   Lab Units 25  0442 25  0631 25  0909 25  0417 02/15/25  0531 25  0427 25  0416   SODIUM mmol/L 138 138 138 139 137 137 139   POTASSIUM mmol/L 4.5 4.6 4.2 4.8 4.3 4.3 4.3   CHLORIDE mmol/L 100 100 99 102 102 104 104   CO2 mmol/L 24.9 23.1 24.7 22.9 22.0 19.0* 18.0*   BUN mg/dL 74* 71* 65* 66* 55* 49* 37*   CREATININE mg/dL 1.53* 1.37* 1.31* 1.49* 1.15* 1.43* 1.40* "   CALCIUM mg/dL 9.5 9.2 9.1 8.7 8.6 8.8 9.0   GLUCOSE mg/dL 158* 188* 223* 240* 137* 136* 202*     Results from last 7 days   Lab Units 02/15/25  0530   INR  1.16*             Scheduled Meds:   aspirin, 81 mg, Oral, BID  carvedilol, 12.5 mg, Oral, BID With Meals  cefTRIAXone, 2,000 mg, Intravenous, Q24H  empagliflozin, 25 mg, Oral, Daily  furosemide, 20 mg, Oral, Daily  glipizide, 5 mg, Oral, QAM AC  insulin glargine, 10 Units, Subcutaneous, Daily  insulin glargine, 15 Units, Subcutaneous, Nightly  insulin lispro, 3-14 Units, Subcutaneous, 4x Daily AC & at Bedtime  ipratropium-albuterol, 3 mL, Nebulization, Q6H While Awake - RT  predniSONE, 40 mg, Oral, Daily With Breakfast  sodium bicarbonate, 650 mg, Oral, TID  sodium chloride, 10 mL, Intravenous, Q12H      PRN Meds:     senna-docusate sodium **AND** polyethylene glycol **AND** bisacodyl **AND** bisacodyl    calcium carbonate    dextrose    dextrose    glucagon (human recombinant)    ipratropium-albuterol    methocarbamol    nitroglycerin    ondansetron ODT **OR** ondansetron    sodium chloride    sodium chloride  Continuous Infusions:       Assessment & Plan   Active Hospital Problems    Diagnosis  POA    **Bacterial pneumonia [J15.9]  Yes    Sepsis, unspecified organism [A41.9]  Yes    Influenza A [J10.1]  Yes    Acute pain of left knee [M25.562]  Yes    Type 2 diabetes mellitus with hyperglycemia, with long-term current use of insulin [E11.65, Z79.4]  Not Applicable    CKD (chronic kidney disease) stage 3, GFR 30-59 ml/min [N18.30]  Yes    Acute respiratory failure with hypoxia [J96.01]  Yes      Resolved Hospital Problems   No resolved problems to display.       Assessment & Plan    1.  Acute respiratory failure with hypoxia, bacterial pneumonia, recent history of flu infection, superimposed bacterial pneumonia. Treated with antibiotic including Rocephin and doxycycline.  No further need of antibiotics for pneumonia but she remains on these for possible septic  arthritis of the knee as outlined below.     2.  Acute pain in the left knee, S/P OR for debridement and washout.  ID recommends a 4-week course of IV antibiotics.  Patient now agreeable to IV antibiotics.  Having trouble finding a facility that will take her.  She is also on prednisone which I will begin tapering today.  If no facility can be found that we will take her with the IV antibiotics then ID has outlined a plan for oral antibiotics as a last resort once all other options have been exhausted.     3.  Acute on chronic kidney injury, renal function has improved, appears to be close to baseline.  Avoid nephrotoxic medications.  Nephrology on board and following.     4.  Diabetes mellitus.  Blood sugars overall reasonably controlled.  Continue present regimen.     5.  Transaminitis.  Gastroenterology suspected muscle related injury.  LFTs trending down     6.  CODE STATUS is full code.       Await discharge planning      Expected Discharge Date: 2/20/2025; Expected Discharge Time:      Silvestre Pierre MD  [facility]  02/19/25  09:11 EST

## 2025-02-19 NOTE — PROGRESS NOTES
[facility]     Progress Note    Patient Name: Chrissy Gutierrez  : 1950  MRN: [id number]  Primary Care Physician:  Jai Steven MD  Date of admission: 2/10/2025    Subjective   Subjective     Chief Complaint: oral on ckd     History of Present Illness  Patient with oral on ckd III admitted with flu and knee infection     Review of Systems  She is feeling ok this am  No acute events.     Objective   Objective     Vitals:   Temp:  [96.7 °F (35.9 °C)-98.1 °F (36.7 °C)] 98.1 °F (36.7 °C)  Heart Rate:  [75-95] 81  Resp:  [16-18] 16  BP: (111-129)/(68-87) 129/87  Flow (L/min) (Oxygen Therapy):  [3] 3    Physical Exam   General Appearance:  In no acute distress.    HEENT: Normal HEENT exam.     Extremities: .  There is no deformity, effusion or dependent edema.    Neurological: Patient is alert     Result Review    Result Review:  I have personally reviewed the results from the time of this admission to 2025 08:02 EST and agree with these findings:  [x]  Laboratory list / accordion  [x]  Microbiology  []  Radiology  []  EKG/Telemetry   []  Cardiology/Vascular   []  Pathology  []  Old records  []  Other:  Most notable findings include:       Assessment & Plan   Assessment / Plan     Brief Patient Summary:  Chrissy Gutierrez is a 74 y.o. female who has influenza and ckd III    Active Hospital Problems:  Active Hospital Problems    Diagnosis     **Bacterial pneumonia     Sepsis, unspecified organism     Influenza A     Acute pain of left knee     Type 2 diabetes mellitus with hyperglycemia, with long-term current use of insulin     CKD (chronic kidney disease) stage 3, GFR 30-59 ml/min     Acute respiratory failure with hypoxia      Plan:   CKD stage IIIa, (1.7-1.8)    Metabolic acidosis    Bacterial pneumonia, on IV/p.o. antibiotics    Sepsis, unspecified organism    Influenza A  Chronic diastolic CHF, back on oral diuretics today    Acute pain of left knee- septic arthritis s/p left knee irrigation and  debridement.     Type 2 diabetes mellitus with hyperglycemia, with long-term current use of insulin    CKD (chronic kidney disease) stage 3, GFR 30-59 ml/min    Acute respiratory failure with hypoxia  Hyperphosphatemia   Azotemia- worse. No uremia    Renal functions table within her baseline.  Azotemia stable, likely due to steroids   Metabolic acidosis stable. Continue PO bicarb at current dose.   Continue with current diuretic regimen   Phos a bit elevated today. Check today's   Noted plans for 4 weeks of IV abx for septic knee.   Will follow       Antionette Patel MD   [facility].

## 2025-02-19 NOTE — PROGRESS NOTES
Orthopaedic Progress Note     Knee pain improving. Dressing clean and dry. To change tomorrow. Pending placement. Asa lvx held. To restart asa 81 mg 2/19/25    NAD  Alert  Respirations are nonlabored   LLE  Dressing clean and dry  Able to DF and PF at the ankle weakly   Knee ROM 0 -45   Sensation is intact distally   Foot is wwp   Compartments are soft and compressible     Ranging all other joints as tolerated with no pain right knee stiff with ROM 0-90     74 year old female with suspected left knee septic arthritis in the setting of bacterial pneumonia and UTI   POD 6 left knee irrigation and debridement   -discussed with her again that her cultures have remained negative and ID recommends 4 weeks course of IV Rocephin.   -WBAT BLE   -encourage ROM and mobilization  -wound dressing clean and dry to change tomorrow   -to chair if able  -pain control   -ice and elevation   -PT    -restart dvt ppx 81 mg ASA BID 2/19/25 remain on until fully mobilizing    -please call/chat  with any questions or concerns.    Arvind Ibarra MD   [facility]   [phone] - [place] Office  [phone] - [place] Office

## 2025-02-19 NOTE — PLAN OF CARE
Problem: Adult Inpatient Plan of Care  Goal: Plan of Care Review  Outcome: Progressing  Goal: Absence of Hospital-Acquired Illness or Injury  Intervention: Identify and Manage Fall Risk  Recent Flowsheet Documentation  Taken 2/19/2025 1600 by Ning Pierre RN  Safety Promotion/Fall Prevention:   safety round/check completed   fall prevention program maintained  Taken 2/19/2025 1400 by Ning Pierre RN  Safety Promotion/Fall Prevention:   safety round/check completed   fall prevention program maintained  Taken 2/19/2025 1200 by Ning Pierre RN  Safety Promotion/Fall Prevention:   safety round/check completed   fall prevention program maintained  Taken 2/19/2025 1000 by Ning Pierre RN  Safety Promotion/Fall Prevention:   safety round/check completed   fall prevention program maintained  Taken 2/19/2025 0842 by Ning Pierre RN  Safety Promotion/Fall Prevention:   safety round/check completed   fall prevention program maintained   Goal Outcome Evaluation:

## 2025-02-19 NOTE — PROGRESS NOTES
"  Infectious Diseases Progress Note    Roxanne Duncan MD     [facility]  Los: 9 days  Patient Identification:  Name: Chrissy Gutierrez  Age: 74 y.o.  Sex: female  :  1950  MRN: [id number]         Primary Care Physician: Jai Steven MD        Subjective: Continues to improve.  Still waiting for final decision from insurance company and place where she can get IV antibiotics.  Leaning more towards IV antibiotics as she understands the limitations of oral antibiotic therapy.  Interval History: See consultation note.    Objective:    Scheduled Meds:aspirin, 81 mg, Oral, BID  carvedilol, 12.5 mg, Oral, BID With Meals  cefTRIAXone, 2,000 mg, Intravenous, Q24H  empagliflozin, 25 mg, Oral, Daily  furosemide, 20 mg, Oral, Daily  glipizide, 5 mg, Oral, QAM AC  insulin glargine, 10 Units, Subcutaneous, Daily  insulin glargine, 15 Units, Subcutaneous, Nightly  insulin lispro, 3-14 Units, Subcutaneous, 4x Daily AC & at Bedtime  ipratropium-albuterol, 3 mL, Nebulization, Q6H While Awake - RT  predniSONE, 40 mg, Oral, Daily With Breakfast  sodium bicarbonate, 650 mg, Oral, TID  sodium chloride, 10 mL, Intravenous, Q12H      Continuous Infusions:     Vital signs in last 24 hours:  Temp:  [96.7 °F (35.9 °C)-98.1 °F (36.7 °C)] 98.1 °F (36.7 °C)  Heart Rate:  [75-95] 81  Resp:  [16-18] 16  BP: (111-129)/(68-87) 129/87    Intake/Output:    Intake/Output Summary (Last 24 hours) at 2025 0829  Last data filed at 2025 1100  Gross per 24 hour   Intake 360 ml   Output 300 ml   Net 60 ml       Exam:  /87 (BP Location: Right arm, Patient Position: Lying)   Pulse 81   Temp 98.1 °F (36.7 °C) (Oral)   Resp 16   Ht 170.2 cm (67\")   Wt 92.5 kg (204 lb)   SpO2 94%   BMI 31.95 kg/m²   Patient is examined using the personal protective equipment as per guidelines from infection control for this particular patient as enacted.  Hand washing was performed before and after patient interaction.  General " Appearance:    Alert, cooperative, no distress, AAOx3                          Head:    Normocephalic, without obvious abnormality, atraumatic                           Eyes:    PERRL, conjunctivae/corneas clear, EOM's intact, both eyes                         Throat:   Lips, tongue, gums normal; oral mucosa pink and moist                           Neck:   Supple, symmetrical, trachea midline, no JVD                         Lungs:    Clear to auscultation bilaterally, respirations unlabored                 Chest Wall:    No tenderness or deformity                          Heart:  S1-S2 regular                  Abdomen:   Soft nontender                 Extremities: Left knee and lower extremities wrapped in the Ace wrap.                        Pulses:   Pulses palpable in all extremities                            Skin:   Skin is warm and dry,  no rashes or palpable lesions                  Neurologic: Alert and oriented x 3       Data Review:    I reviewed the patient's new clinical results.  Results from last 7 days   Lab Units 02/19/25 0442 02/18/25  0631 02/17/25  0908 02/16/25  0417 02/15/25  0531 02/14/25  0427 02/13/25  0416   WBC 10*3/mm3 13.74* 13.47* 12.74* 11.70* 10.16 15.00* 17.05*   HEMOGLOBIN g/dL 11.2* 11.1* 10.9* 10.6* 10.0* 9.7* 9.9*   PLATELETS 10*3/mm3 601* 577* 575* 483* 485* 430 395     Results from last 7 days   Lab Units 02/19/25  0442 02/18/25  0631 02/17/25  0909 02/16/25  0417 02/15/25  0531 02/14/25  0427 02/13/25  0416   SODIUM mmol/L 138 138 138 139 137 137 139   POTASSIUM mmol/L 4.5 4.6 4.2 4.8 4.3 4.3 4.3   CHLORIDE mmol/L 100 100 99 102 102 104 104   CO2 mmol/L 24.9 23.1 24.7 22.9 22.0 19.0* 18.0*   BUN mg/dL 74* 71* 65* 66* 55* 49* 37*   CREATININE mg/dL 1.53* 1.37* 1.31* 1.49* 1.15* 1.43* 1.40*   CALCIUM mg/dL 9.5 9.2 9.1 8.7 8.6 8.8 9.0   GLUCOSE mg/dL 158* 188* 223* 240* 137* 136* 202*     Microbiology Results (last 10 days)       Procedure Component Value - Date/Time    Tissue /  Bone Culture - Tissue, Knee, Left [[id number]] Collected: 02/12/25 1341    Lab Status: Final result Specimen: Tissue from Knee, Left Updated: 02/15/25 0726     Tissue Culture No growth at 3 days     Gram Stain No WBCs or organisms seen    Anaerobic Culture - Synovial Fluid, Knee, Left [[id number]]  (Normal) Collected: 02/12/25 1327    Lab Status: Final result Specimen: Synovial Fluid from Knee, Left Updated: 02/17/25 0710     Anaerobic Culture No anaerobes isolated at 5 days    Body Fluid Culture - Synovial Fluid, Knee, Left [[id number]] Collected: 02/12/25 1327    Lab Status: Final result Specimen: Synovial Fluid from Knee, Left Updated: 02/17/25 0742     Body Fluid Culture No growth at 5 days     Gram Stain Many (4+) WBCs seen      No organisms seen    Body Fluid Culture - Synovial Fluid, Knee, Left [[id number]] Collected: 02/11/25 1745    Lab Status: Final result Specimen: Synovial Fluid from Knee, Left Updated: 02/16/25 0620     Body Fluid Culture No growth at 5 days     Gram Stain Moderate (3+) WBCs seen      No organisms seen    Anaerobic Culture - Synovial Fluid, Knee, Left [[id number]]  (Normal) Collected: 02/11/25 1745    Lab Status: Final result Specimen: Synovial Fluid from Knee, Left Updated: 02/16/25 0701     Anaerobic Culture No anaerobes isolated at 5 days    Urine Culture - Urine, Urine, Clean Catch [[id number]]  (Abnormal)  (Susceptibility) Collected: 02/10/25 0502    Lab Status: Final result Specimen: Urine, Clean Catch Updated: 02/12/25 0903     Urine Culture >100,000 CFU/mL Escherichia coli    Narrative:      Colonization of the urinary tract without infection is common. Treatment is discouraged unless the patient is symptomatic, pregnant, or undergoing an invasive urologic procedure.    Susceptibility        Escherichia coli      TICO      Amoxicillin + Clavulanate Susceptible      Ampicillin Susceptible      Ampicillin + Sulbactam Susceptible      Cefazolin (Urine) Susceptible      Cefepime  Susceptible      Ceftazidime Susceptible      Ceftriaxone Susceptible      Gentamicin Susceptible      Levofloxacin Susceptible      Nitrofurantoin Susceptible      Piperacillin + Tazobactam Susceptible      Trimethoprim + Sulfamethoxazole Susceptible                           Blood Culture - Blood, Arm, Left [[id number]]  (Normal) Collected: 02/10/25 0501    Lab Status: Final result Specimen: Blood from Arm, Left Updated: 02/15/25 0515     Blood Culture No growth at 5 days    Blood Culture - Blood, Arm, Left [[id number]]  (Normal) Collected: 02/10/25 0500    Lab Status: Final result Specimen: Blood from Arm, Left Updated: 02/15/25 0515     Blood Culture No growth at 5 days    Respiratory Panel PCR w/COVID-19(SARS-CoV-2) [facility] In-House, NP Swab in UTM/VTM, 2 HR TAT - Swab, Nasopharynx [[id number]]  (Abnormal) Collected: 02/10/25 0401    Lab Status: Final result Specimen: Swab from Nasopharynx Updated: 02/10/25 0824     ADENOVIRUS, PCR Not Detected     Coronavirus 229E Not Detected     Coronavirus HKU1 Not Detected     Coronavirus NL63 Not Detected     Coronavirus OC43 Not Detected     COVID19 Not Detected     Human Metapneumovirus Not Detected     Human Rhinovirus/Enterovirus Not Detected     Influenza A PCR Equivocal     Comment: Appended report. These results have been appended to a previously preliminary verified report.        Influenza B PCR Not Detected     Parainfluenza Virus 1 Not Detected     Parainfluenza Virus 2 Not Detected     Parainfluenza Virus 3 Not Detected     Parainfluenza Virus 4 Not Detected     RSV, PCR Not Detected     Bordetella pertussis pcr Not Detected     Bordetella parapertussis PCR Not Detected     Chlamydophila pneumoniae PCR Not Detected     Mycoplasma pneumo by PCR Not Detected    Narrative:      In the setting of a positive respiratory panel with a viral infection PLUS a negative procalcitonin without other underlying concern for bacterial infection, consider  observing off antibiotics or discontinuation of antibiotics and continue supportive care. If the respiratory panel is positive for atypical bacterial infection (Bordetella pertussis, Chlamydophila pneumoniae, or Mycoplasma pneumoniae), consider antibiotic de-escalation to target atypical bacterial infection.              Assessment:    Bacterial pneumonia    Sepsis, unspecified organism    Influenza A    Acute pain of left knee    Type 2 diabetes mellitus with hyperglycemia, with long-term current use of insulin    CKD (chronic kidney disease) stage 3, GFR 30-59 ml/min    Acute respiratory failure with hypoxia    74-year-old female with  1-painful tender left knee with decline in function status post joint aspiration followed by I&D and washout in the setting of respiratory tract infection with influenza A and abnormal urinalysis consistent with UTI-this presentation of tender painful knee with worsening function and abnormal joint fluid analysis with crystal for uric acid positive could very well be due to combination of multiple pathologies happening together:             -Acute gouty arthritis with             -Secondary septic arthritis due to hematogenous seeding from the lung or urine versus             -Progression of osteoarthritis.  2-acute influenza A with secondary bacterial pneumonia  3-abnormal urinalysis with urine culture without any specific urinary symptoms  4-acute on chronic renal insufficiency with prior history of left-sided hydronephrosis and history of right nephrectomy  5-osteoarthritis of the right shoulder and wrist without any fracture  6-hypertension  7-peripheral neuropathy  8-history of renal cell carcinoma-history of right nephrectomy  9-type 2 diabetes.     Recommendations/Discussions:  See my discussion and recommendations on 2/13/2025 and 2/14/2025.  Management of concomitant gout with short course of oral steroid per primary team and orthopedic surgery service.  Patient will need  out of hospital follow-up with orthopedic surgery service as per their recommendations  See discussion in the additional progress note on 2/14/2025 for alternate oral antibiotic therapy as a second line option which is better than taking no treatment as she is still very reluctant for IV treatment as recommended:  Patient is very concerned about her ability to get IV antibiotics and was hoping that she would get oral antibiotics for her infection.  I explained the patient about ideal treatment for this situation as recommended in my progress note and orders for the case management.  If patient after being explained the risks and benefits of her decision making after using oral antibiotics over IV antibiotics makes the decision to go with oral antibiotics then she could be switched to oral Keflex 500 mg every 8 hours for 28 days from 2/11/2025.  With ongoing continued management of gout  Patient will need close follow-up with the primary care provider with once weekly lab work to monitor antibiotic toxicity and side effects with instructions to reach out to Dr. Duncan on a once a week basis to go over review of system to monitor antibiotic toxicity and side effects and have a primary care provider reach out to me if there is need for discussion of further management of her left knee infection.  Ideal treatment for this presentation is discussed will be 4 weeks of IV Rocephin from last surgical intervention on 2/11/2025.  Following orders are written for case management:   Please arrange for 4 weeks of IV Rocephin at 2 g 24 hours starting 2/11/2025.  Check weekly CBC CMP and CRP and call abnormal results at [phone].  Remove midline/PICC line after completion of antibiotic therapy  Please educate patient to reach out to Dr. Duncan on once a week basis at [phone] to go over review of system to monitor antibiotic toxicity and effectiveness of treatment.  Diagnosis:  1-painful tender left knee with decline in function  status post joint aspiration followed by I&D and washout in the setting of respiratory tract infection with influenza A and abnormal urinalysis consistent with UTI-this presentation of tender painful knee with worsening function and abnormal joint fluid analysis with crystal for uric acid positive could very well be due to combination of multiple pathologies happening together:             -Acute gouty arthritis with             -Secondary septic arthritis due to hematogenous seeding from the lung or urine versus             -Progression of osteoarthritis.  2-acute influenza A with secondary bacterial pneumonia  3-abnormal urinalysis with urine culture without any specific urinary symptoms  Roxanne Duncan MD  2/19/2025  08:29 EST    Parts of this note may be an electronic transcription/translation of spoken language to printed text using the Dragon dictation system.

## 2025-02-19 NOTE — PLAN OF CARE
Problem: Adult Inpatient Plan of Care  Goal: Absence of Hospital-Acquired Illness or Injury  Intervention: Identify and Manage Fall Risk  Recent Flowsheet Documentation  Taken 2/18/2025 2114 by Johnny Amaro, RN  Safety Promotion/Fall Prevention:   activity supervised   assistive device/personal items within reach   clutter free environment maintained   safety round/check completed   nonskid shoes/slippers when out of bed   Goal Outcome Evaluation:

## 2025-02-20 LAB
GLUCOSE BLDC GLUCOMTR-MCNC: 138 MG/DL (ref 70–130)
GLUCOSE BLDC GLUCOMTR-MCNC: 243 MG/DL (ref 70–130)
GLUCOSE BLDC GLUCOMTR-MCNC: 353 MG/DL (ref 70–130)
GLUCOSE BLDC GLUCOMTR-MCNC: 372 MG/DL (ref 70–130)

## 2025-02-20 PROCEDURE — 94664 DEMO&/EVAL PT USE INHALER: CPT

## 2025-02-20 PROCEDURE — 63710000001 INSULIN GLARGINE PER 5 UNITS: Performed by: ORTHOPAEDIC SURGERY

## 2025-02-20 PROCEDURE — 82948 REAGENT STRIP/BLOOD GLUCOSE: CPT

## 2025-02-20 PROCEDURE — 63710000001 INSULIN LISPRO (HUMAN) PER 5 UNITS: Performed by: INTERNAL MEDICINE

## 2025-02-20 PROCEDURE — 94799 UNLISTED PULMONARY SVC/PX: CPT

## 2025-02-20 PROCEDURE — 63710000001 INSULIN GLARGINE PER 5 UNITS: Performed by: INTERNAL MEDICINE

## 2025-02-20 PROCEDURE — 25010000002 CEFTRIAXONE PER 250 MG: Performed by: INTERNAL MEDICINE

## 2025-02-20 PROCEDURE — 97110 THERAPEUTIC EXERCISES: CPT

## 2025-02-20 PROCEDURE — 63710000001 PREDNISONE PER 1 MG: Performed by: INTERNAL MEDICINE

## 2025-02-20 RX ORDER — ECHINACEA PURPUREA EXTRACT 125 MG
1 TABLET ORAL AS NEEDED
Status: DISCONTINUED | OUTPATIENT
Start: 2025-02-20 | End: 2025-02-25

## 2025-02-20 RX ADMIN — CARVEDILOL 12.5 MG: 12.5 TABLET, FILM COATED ORAL at 17:05

## 2025-02-20 RX ADMIN — IPRATROPIUM BROMIDE AND ALBUTEROL SULFATE 3 ML: 2.5; .5 SOLUTION RESPIRATORY (INHALATION) at 19:34

## 2025-02-20 RX ADMIN — ASPIRIN 81 MG: 81 TABLET, COATED ORAL at 08:36

## 2025-02-20 RX ADMIN — EMPAGLIFLOZIN 25 MG: 25 TABLET, FILM COATED ORAL at 08:36

## 2025-02-20 RX ADMIN — INSULIN LISPRO 5 UNITS: 100 INJECTION, SOLUTION INTRAVENOUS; SUBCUTANEOUS at 12:33

## 2025-02-20 RX ADMIN — OXYCODONE HYDROCHLORIDE 5 MG: 5 TABLET ORAL at 17:05

## 2025-02-20 RX ADMIN — ASPIRIN 81 MG: 81 TABLET, COATED ORAL at 21:53

## 2025-02-20 RX ADMIN — METHOCARBAMOL TABLETS 500 MG: 500 TABLET, COATED ORAL at 21:53

## 2025-02-20 RX ADMIN — IPRATROPIUM BROMIDE AND ALBUTEROL SULFATE 3 ML: 2.5; .5 SOLUTION RESPIRATORY (INHALATION) at 11:35

## 2025-02-20 RX ADMIN — CEFTRIAXONE 2000 MG: 2 INJECTION, POWDER, FOR SOLUTION INTRAMUSCULAR; INTRAVENOUS at 08:36

## 2025-02-20 RX ADMIN — SODIUM BICARBONATE 650 MG TABLET 650 MG: at 08:36

## 2025-02-20 RX ADMIN — IPRATROPIUM BROMIDE AND ALBUTEROL SULFATE 3 ML: 2.5; .5 SOLUTION RESPIRATORY (INHALATION) at 07:12

## 2025-02-20 RX ADMIN — SODIUM BICARBONATE 650 MG TABLET 650 MG: at 17:05

## 2025-02-20 RX ADMIN — OXYCODONE HYDROCHLORIDE 5 MG: 5 TABLET ORAL at 00:18

## 2025-02-20 RX ADMIN — FUROSEMIDE 20 MG: 20 TABLET ORAL at 08:35

## 2025-02-20 RX ADMIN — INSULIN GLARGINE 10 UNITS: 100 INJECTION, SOLUTION SUBCUTANEOUS at 08:36

## 2025-02-20 RX ADMIN — CARVEDILOL 12.5 MG: 12.5 TABLET, FILM COATED ORAL at 08:36

## 2025-02-20 RX ADMIN — INSULIN LISPRO 12 UNITS: 100 INJECTION, SOLUTION INTRAVENOUS; SUBCUTANEOUS at 21:53

## 2025-02-20 RX ADMIN — INSULIN GLARGINE 10 UNITS: 100 INJECTION, SOLUTION SUBCUTANEOUS at 21:53

## 2025-02-20 RX ADMIN — OXYCODONE HYDROCHLORIDE 5 MG: 5 TABLET ORAL at 08:36

## 2025-02-20 RX ADMIN — INSULIN LISPRO 12 UNITS: 100 INJECTION, SOLUTION INTRAVENOUS; SUBCUTANEOUS at 17:05

## 2025-02-20 RX ADMIN — GLIPIZIDE 5 MG: 5 TABLET ORAL at 08:36

## 2025-02-20 RX ADMIN — Medication 10 ML: at 08:36

## 2025-02-20 RX ADMIN — PREDNISONE 30 MG: 20 TABLET ORAL at 08:35

## 2025-02-20 NOTE — PLAN OF CARE
Goal Outcome Evaluation:  Plan of Care Reviewed With: patient        Progress: improving  Outcome Evaluation: Upon entering room, pt was reclined in chair and agreeable to do PT. Performed L TKA exercise protocol x 10 reps. Pt had noticeable weakness and difficulty with performing SLR's using compensation from trunk musculature. Pt was CGA with sit <-> stand. Ambulated 15 ft with CGA with Rwx to the bathroom to void and returned to chair. Will continue to progress as tolerated.    Anticipated Discharge Disposition (PT): skilled nursing facility

## 2025-02-20 NOTE — THERAPY TREATMENT NOTE
Patient Name: Chrissy Gutierrez  : 1950    MRN: [id number]                              Today's Date: 2025       Admit Date: 2/10/2025    Visit Dx:     ICD-10-CM ICD-9-CM   1. Acute respiratory failure with hypoxia  J96.01 518.81   2. Pneumonia of right lower lobe due to infectious organism  J18.9 486   3. Acute pain of both knees  M25.561 338.19    M25.562 719.46   4. Generalized weakness  R53.1 780.79   5. SONI (acute kidney injury)  N17.9 584.9   6. Acute UTI  N39.0 599.0   7. Septic arthritis of knee, left  M00.9 711.06   8. Follow-up exam  Z09 V67.9     Patient Active Problem List   Diagnosis    Right lower lobe pneumonia    Sepsis, unspecified organism    Influenza A    Acute pain of left knee    Bacterial pneumonia    Type 2 diabetes mellitus with hyperglycemia, with long-term current use of insulin    CKD (chronic kidney disease) stage 3, GFR 30-59 ml/min    Acute respiratory failure with hypoxia     Past Medical History:   Diagnosis Date    Cancer     right kidney    Chronic kidney disease     Hypertension     Low back pain     Osteoarthritis     Peripheral neuropathy      Past Surgical History:   Procedure Laterality Date    BACK SURGERY      EPIDURAL BLOCK      KIDNEY SURGERY Right 2017    REMOVED RIGHT KIDNEY    KNEE INCISION AND DRAINAGE Left 2025    Procedure: KNEE INCISION AND DRAINAGE;  Surgeon: Arvind Ibarra MD;  Location: [facility];  Service: Orthopedics;  Laterality: Left;    LUMBAR DISCECTOMY FUSION INSTRUMENTATION N/A 2017    Procedure: 1 LEVEL LAMINECTOMY DECOMPRESSION L4 5 EXCISION FACET CYST;  Surgeon: Negrito Oconnell DO;  Location: [facility];  Service:     TUBAL ABDOMINAL LIGATION      WISDOM TOOTH EXTRACTION        General Information       Row Name 25 1411          Physical Therapy Time and Intention    Document Type therapy note (daily note)  -MS (r) ZT (t) MS (c)     Mode of Treatment individual therapy;physical therapy  -MS (r) ZT (t) MS  (c)       Row Name 02/20/25 1411          General Information    Patient Profile Reviewed yes  -MS (r) ZT (t) MS (c)     Existing Precautions/Restrictions fall  exit alarm  -MS (r) ZT (t) MS (c)       Row Name 02/20/25 1411          Cognition    Orientation Status (Cognition) oriented x 3  -MS (r) ZT (t) MS (c)       Row Name 02/20/25 1411          Safety Issues/Impairments Affecting Functional Mobility    Comment, Safety Issues/Impairments (Mobility) Gait belt used for safety  -MS (r) ZT (t) MS (c)               User Key  (r) = Recorded By, (t) = Taken By, (c) = Cosigned By      Initials Name Provider Type    Sofia Candelario, PT Physical Therapist    Roderick Stockton, PT Student PT Student                   Mobility       Row Name 02/20/25 1411          Sit-Stand Transfer    Sit-Stand Hondo (Transfers) contact guard  -MS (r) ZT (t) MS (c)     Assistive Device (Sit-Stand Transfers) walker, front-wheeled  -MS (r) ZT (t) MS (c)       Row Name 02/20/25 1411          Gait/Stairs (Locomotion)    Hondo Level (Gait) contact guard  -MS (r) ZT (t) MS (c)     Assistive Device (Gait) walker, front-wheeled  -MS (r) ZT (t) MS (c)     Patient was able to Ambulate yes  -MS (r) ZT (t) MS (c)     Distance in Feet (Gait) 15  -MS (r) ZT (t) MS (c)     Deviations/Abnormal Patterns (Gait) trinh decreased;gait speed decreased  -MS (r) ZT (t) MS (c)     Bilateral Gait Deviations forward flexed posture  -MS (r) ZT (t) MS (c)       Row Name 02/20/25 1411          Mobility    Extremity Weight-bearing Status left lower extremity  -MS (r) ZT (t) MS (c)     Left Lower Extremity (Weight-bearing Status) weight-bearing as tolerated (WBAT)  -MS (r) ZT (t) MS (c)               User Key  (r) = Recorded By, (t) = Taken By, (c) = Cosigned By      Initials Name Provider Type    Sofia Candelario, PT Physical Therapist    Roderick Stockton, PT Student PT Student                   Obj/Interventions       Row Name 02/20/25 1412           Motor Skills    Therapeutic Exercise knee  L TKA exercise protocol x 10 reps  -MS (r) ZT (t) MS (c)               User Key  (r) = Recorded By, (t) = Taken By, (c) = Cosigned By      Initials Name Provider Type    Sofia Candelario, PT Physical Therapist    Roderick Stockton, PT Student PT Student                   Goals/Plan    No documentation.                  Clinical Impression       Row Name 02/20/25 1413          Pain    Pre/Posttreatment Pain Comment No noted grimacing with mobility during session  -MS (r) ZT (t) MS (c)       Row Name 02/20/25 1413          Plan of Care Review    Plan of Care Reviewed With patient  -MS (r) ZT (t) MS (c)     Progress improving  -MS (r) ZT (t) MS (c)     Outcome Evaluation Upon entering room, pt was reclined in chair and agreeable to do PT. Performed L TKA exercise protocol x 10 reps. Pt had noticeable weakness and difficulty with performing SLR's using compensation from trunk musculature. Pt was CGA with sit <-> stand. Ambulated 15 ft with CGA with Rwx to the bathroom to void and returned to chair. Will continue to progress as tolerated.  -MS (r) ZT (t) MS (c)       Row Name 02/20/25 1413          Positioning and Restraints    Pre-Treatment Position sitting in chair/recliner  -MS (r) ZT (t) MS (c)     Post Treatment Position chair  -MS (r) ZT (t) MS (c)     In Bed --  -MS (r) ZT (t) MS (c)     In Chair notified nsg;reclined;call light within reach;encouraged to call for assist;exit alarm on  -MS (r) ZT (t) MS (c)               User Key  (r) = Recorded By, (t) = Taken By, (c) = Cosigned By      Initials Name Provider Type    Sofia Candelario, PT Physical Therapist    Roderick Stockton, PT Student PT Student                   Outcome Measures       Row Name 02/20/25 1419 02/20/25 0836       How much help from another person do you currently need...    Turning from your back to your side while in flat bed without using bedrails? 3  -MS (r) ZT (t) MS (c) 3  -CE     Moving from lying on back to sitting on the side of a flat bed without bedrails? 3  -MS (r) ZT (t) MS (c) 3  -CE    Moving to and from a bed to a chair (including a wheelchair)? 3  -MS (r) ZT (t) MS (c) 3  -CE    Standing up from a chair using your arms (e.g., wheelchair, bedside chair)? 3  -MS (r) ZT (t) MS (c) 3  -CE    Climbing 3-5 steps with a railing? 2  -MS (r) ZT (t) MS (c) 2  -CE    To walk in hospital room? 3  -MS (r) ZT (t) MS (c) 3  -CE    AM-PAC 6 Clicks Score (PT) 17  -MS (r) ZT (t) 17  -CE    Highest Level of Mobility Goal 5 --> Static standing  -MS (r) ZT (t) 5 --> Static standing  -CE              User Key  (r) = Recorded By, (t) = Taken By, (c) = Cosigned By      Initials Name Provider Type    CE Sherry Barroso, RN Registered Nurse    MS UzmaSofia, PT Physical Therapist    ZT Roderick Casey, PT Student PT Student                                 Physical Therapy Education       Title: PT OT SLP Therapies (Done)       Topic: Physical Therapy (Done)       Point: Mobility training (Done)       Learning Progress Summary            Patient Acceptance, E, DU by  at 2/20/2025 1419    Acceptance, E,D, VU,NR by MS at 2/15/2025 1635    Acceptance, E, NR by  at 2/11/2025 1323                      Point: Home exercise program (Done)       Learning Progress Summary            Patient Acceptance, E, DU by  at 2/20/2025 1419    Acceptance, E, NR by  at 2/11/2025 1323                                      User Key       Initials Effective Dates Name Provider Type Discipline    MS 06/16/21 -  Jed Mark, PT Physical Therapist PT     09/06/24 -  Shen Gutierrez, PT Physical Therapist PT     01/29/25 -  Roderick Casey, PT Student PT Student PT                  PT Recommendation and Plan     Progress: improving  Outcome Evaluation: Upon entering room, pt was reclined in chair and agreeable to do PT. Performed L TKA exercise protocol x 10 reps. Pt had noticeable weakness and difficulty  with performing SLR's using compensation from trunk musculature. Pt was CGA with sit <-> stand. Ambulated 15 ft with CGA with Rwx to the bathroom to void and returned to chair. Will continue to progress as tolerated.     Time Calculation:         PT Charges       Row Name 02/20/25 1420             Time Calculation    Start Time 1332  -MS (r) ZT (t) MS (c)      Stop Time 1347  -MS (r) ZT (t) MS (c)      Time Calculation (min) 15 min  -MS (r) ZT (t)      PT Received On 02/20/25  -MS (r) ZT (t) MS (c)      PT - Next Appointment 02/21/25  -MS (r) ZT (t) MS (c)      PT Goal Re-Cert Due Date 02/25/25  -MS (r) ZT (t) MS (c)         Time Calculation- PT    Total Timed Code Minutes- PT 15 minute(s)  -MS (r) ZT (t) MS (c)                User Key  (r) = Recorded By, (t) = Taken By, (c) = Cosigned By      Initials Name Provider Type    Sofia Candelario, PT Physical Therapist    ZT Roderick Casey, PT Student PT Student                      PT G-Codes  Outcome Measure Options: AM-PAC 6 Clicks Basic Mobility (PT)  AM-PAC 6 Clicks Score (PT): 17  PT Discharge Summary  Anticipated Discharge Disposition (PT): skilled nursing facility    Roderick Casey PT Student  2/20/2025

## 2025-02-20 NOTE — PROGRESS NOTES
"Assessment Date:  02/20/25    NUTRITION SCREENING      Reason for Encounter Length of Stay   Diagnosis/Problem 74 w/o F admitted with bacterial pneumonia, sepsis, and influenza A. History of CKD stage 3, Type 2 DM.     Patient resting in bed reports she is eating well and drinking her supplement She reports her weight has been stable over the past few years. She denies needs or concerns.        PO Diet Diet: Diabetic; Consistent Carbohydrate; Fluid Consistency: Thin (IDDSI 0)   Supplements Boost Glucose Control TID    PO Intake % 25-75%       Medications MAR reviewed by RD   Labs  Listed below, reviewed   Physical Findings Resting in bed alert    GI Function Constipation, last + BM 2/17/2025   Skin Status Left surgical knee incision        Height  Weight  BMI  Weight Trend     Height: 170.2 cm (67\")  Weight: 92.5 kg (204 lb) (02/10/25 0330)  Body mass index is 31.95 kg/m².  Stable       Nutrition Problem (PES) Nutrition appropriate for condition at this time as evidence by good po intake/appetite and stable weight.       Intervention/Plan Continue current diet and supplements  Encourage PO intake     RD to follow up per protocol.     Results from last 7 days   Lab Units 02/19/25  0442 02/18/25  0631 02/17/25  0909 02/16/25  0417 02/15/25  0531 02/14/25  0427   SODIUM mmol/L 138 138 138 139   < > 137   POTASSIUM mmol/L 4.5 4.6 4.2 4.8   < > 4.3   CHLORIDE mmol/L 100 100 99 102   < > 104   CO2 mmol/L 24.9 23.1 24.7 22.9   < > 19.0*   BUN mg/dL 74* 71* 65* 66*   < > 49*   CREATININE mg/dL 1.53* 1.37* 1.31* 1.49*   < > 1.43*   CALCIUM mg/dL 9.5 9.2 9.1 8.7   < > 8.8   BILIRUBIN mg/dL <0.2  --   --  <0.2  --  0.2   ALK PHOS U/L 92  --   --  98  --  111   ALT (SGPT) U/L 73*  --   --  128*  --  331*   AST (SGOT) U/L 19  --   --  37*  --  535*   GLUCOSE mg/dL 158* 188* 223* 240*   < > 136*    < > = values in this interval not displayed.     Results from last 7 days   Lab Units 02/19/25  0442   PHOSPHORUS mg/dL 4.6* " "  HEMOGLOBIN g/dL 11.2*   HEMATOCRIT % 34.1     No results found for: \"HGBA1C\"      Electronically signed by:  Soni Harrison RD  02/20/25 08:14 EST    "

## 2025-02-20 NOTE — PROGRESS NOTES
"  Infectious Diseases Progress Note    Roxanne Duncan MD     [facility]  [place]: 10 days  Patient Identification:  Name: Chrissy Gutierrez  Age: 74 y.o.  Sex: female  :  1950  MRN: [id number]         Primary Care Physician: Jai Steven MD        Subjective: Continues to improve denies any fever and chills.  Tolerating antibiotics without any problem.  Interval History: See consultation note.    Objective:    Scheduled Meds:aspirin, 81 mg, Oral, BID  carvedilol, 12.5 mg, Oral, BID With Meals  cefTRIAXone, 2,000 mg, Intravenous, Q24H  empagliflozin, 25 mg, Oral, Daily  furosemide, 20 mg, Oral, Daily  glipizide, 5 mg, Oral, QAM AC  insulin glargine, 10 Units, Subcutaneous, Daily  insulin glargine, 15 Units, Subcutaneous, Nightly  insulin lispro, 3-14 Units, Subcutaneous, 4x Daily AC & at Bedtime  ipratropium-albuterol, 3 mL, Nebulization, Q6H While Awake - RT  predniSONE, 30 mg, Oral, Daily With Breakfast  sodium bicarbonate, 650 mg, Oral, TID  sodium chloride, 10 mL, Intravenous, Q12H      Continuous Infusions:     Vital signs in last 24 hours:  Temp:  [97 °F (36.1 °C)-97.7 °F (36.5 °C)] 97.4 °F (36.3 °C)  Heart Rate:  [74-85] 74  Resp:  [16-18] 18  BP: (114-134)/(66-80) 134/74    Intake/Output:    Intake/Output Summary (Last 24 hours) at 2025 0713  Last data filed at 2025 0643  Gross per 24 hour   Intake 1418 ml   Output 500 ml   Net 918 ml       Exam:  /74 (BP Location: Right arm, Patient Position: Lying)   Pulse 74   Temp 97.4 °F (36.3 °C) (Oral)   Resp 18   Ht 170.2 cm (67\")   Wt 92.5 kg (204 lb)   SpO2 98%   BMI 31.95 kg/m²   Patient is examined using the personal protective equipment as per guidelines from infection control for this particular patient as enacted.  Hand washing was performed before and after patient interaction.  General Appearance:    Alert, cooperative, no distress, AAOx3                          Head:    Normocephalic, without obvious " abnormality, atraumatic                           Eyes:    PERRL, conjunctivae/corneas clear, EOM's intact, both eyes                         Throat:   Lips, tongue, gums normal; oral mucosa pink and moist                           Neck:   Supple, symmetrical, trachea midline, no JVD                         Lungs:    Clear to auscultation bilaterally, respirations unlabored                 Chest Wall:    No tenderness or deformity                          Heart:  S1-S2 regular                  Abdomen:   Soft nontender                 Extremities: Left knee and lower extremities wrapped in the Ace wrap.                        Pulses:   Pulses palpable in all extremities                            Skin:   Skin is warm and dry,  no rashes or palpable lesions                  Neurologic: Alert and oriented x 3       Data Review:    I reviewed the patient's new clinical results.  Results from last 7 days   Lab Units 02/19/25  0442 02/18/25  0631 02/17/25  0908 02/16/25  0417 02/15/25  0531 02/14/25  0427   WBC 10*3/mm3 13.74* 13.47* 12.74* 11.70* 10.16 15.00*   HEMOGLOBIN g/dL 11.2* 11.1* 10.9* 10.6* 10.0* 9.7*   PLATELETS 10*3/mm3 601* 577* 575* 483* 485* 430     Results from last 7 days   Lab Units 02/19/25  0442 02/18/25  0631 02/17/25  0909 02/16/25  0417 02/15/25  0531 02/14/25  0427   SODIUM mmol/L 138 138 138 139 137 137   POTASSIUM mmol/L 4.5 4.6 4.2 4.8 4.3 4.3   CHLORIDE mmol/L 100 100 99 102 102 104   CO2 mmol/L 24.9 23.1 24.7 22.9 22.0 19.0*   BUN mg/dL 74* 71* 65* 66* 55* 49*   CREATININE mg/dL 1.53* 1.37* 1.31* 1.49* 1.15* 1.43*   CALCIUM mg/dL 9.5 9.2 9.1 8.7 8.6 8.8   GLUCOSE mg/dL 158* 188* 223* 240* 137* 136*     Microbiology Results (last 10 days)       Procedure Component Value - Date/Time    Tissue / Bone Culture - Tissue, Knee, Left [[id number]] Collected: 02/12/25 1341    Lab Status: Final result Specimen: Tissue from Knee, Left Updated: 02/15/25 0726     Tissue Culture No growth at 3 days     Gram  Stain No WBCs or organisms seen    Anaerobic Culture - Synovial Fluid, Knee, Left [[id number]]  (Normal) Collected: 02/12/25 1327    Lab Status: Final result Specimen: Synovial Fluid from Knee, Left Updated: 02/17/25 0710     Anaerobic Culture No anaerobes isolated at 5 days    Body Fluid Culture - Synovial Fluid, Knee, Left [[id number]] Collected: 02/12/25 1327    Lab Status: Final result Specimen: Synovial Fluid from Knee, Left Updated: 02/17/25 0742     Body Fluid Culture No growth at 5 days     Gram Stain Many (4+) WBCs seen      No organisms seen    Body Fluid Culture - Synovial Fluid, Knee, Left [[id number]] Collected: 02/11/25 1745    Lab Status: Final result Specimen: Synovial Fluid from Knee, Left Updated: 02/16/25 0620     Body Fluid Culture No growth at 5 days     Gram Stain Moderate (3+) WBCs seen      No organisms seen    Anaerobic Culture - Synovial Fluid, Knee, Left [[id number]]  (Normal) Collected: 02/11/25 1745    Lab Status: Final result Specimen: Synovial Fluid from Knee, Left Updated: 02/16/25 0701     Anaerobic Culture No anaerobes isolated at 5 days              Assessment:    Bacterial pneumonia    Sepsis, unspecified organism    Influenza A    Acute pain of left knee    Type 2 diabetes mellitus with hyperglycemia, with long-term current use of insulin    CKD (chronic kidney disease) stage 3, GFR 30-59 ml/min    Acute respiratory failure with hypoxia    74-year-old female with  1-painful tender left knee with decline in function status post joint aspiration followed by I&D and washout in the setting of respiratory tract infection with influenza A and abnormal urinalysis consistent with UTI-this presentation of tender painful knee with worsening function and abnormal joint fluid analysis with crystal for uric acid positive could very well be due to combination of multiple pathologies happening together:             -Acute gouty arthritis with             -Secondary septic arthritis due to  hematogenous seeding from the lung or urine versus             -Progression of osteoarthritis.  2-acute influenza A with secondary bacterial pneumonia  3-abnormal urinalysis with urine culture without any specific urinary symptoms  4-acute on chronic renal insufficiency with prior history of left-sided hydronephrosis and history of right nephrectomy  5-osteoarthritis of the right shoulder and wrist without any fracture  6-hypertension  7-peripheral neuropathy  8-history of renal cell carcinoma-history of right nephrectomy  9-type 2 diabetes.     Recommendations/Discussions:  See my discussion and recommendations on 2/13/2025 and 2/14/2025.  Management of concomitant gout with short course of oral steroid per primary team and orthopedic surgery service.  Patient will need out of hospital follow-up with orthopedic surgery service as per their recommendations  See discussion in the additional progress note on 2/14/2025 for alternate oral antibiotic therapy as a second line option which is better than taking no treatment as she is still very reluctant for IV treatment as recommended:  Patient is very concerned about her ability to get IV antibiotics and was hoping that she would get oral antibiotics for her infection.  I explained the patient about ideal treatment for this situation as recommended in my progress note and orders for the case management.  If patient after being explained the risks and benefits of her decision making after using oral antibiotics over IV antibiotics makes the decision to go with oral antibiotics then she could be switched to oral Keflex 500 mg every 8 hours for 28 days from 2/11/2025.  With ongoing continued management of gout  Patient will need close follow-up with the primary care provider with once weekly lab work to monitor antibiotic toxicity and side effects with instructions to reach out to Dr. Duncan on a once a week basis to go over review of system to monitor antibiotic toxicity  and side effects and have a primary care provider reach out to me if there is need for discussion of further management of her left knee infection.  Ideal treatment for this presentation is discussed will be 4 weeks of IV Rocephin from last surgical intervention on 2/11/2025.  Following orders are written for case management:   Please arrange for 4 weeks of IV Rocephin at 2 g 24 hours starting 2/11/2025.  Check weekly CBC CMP and CRP and call abnormal results at [phone].  Remove midline/PICC line after completion of antibiotic therapy  Please educate patient to reach out to Dr. Duncan on once a week basis at [phone] to go over review of system to monitor antibiotic toxicity and effectiveness of treatment.  Diagnosis:  1-painful tender left knee with decline in function status post joint aspiration followed by I&D and washout in the setting of respiratory tract infection with influenza A and abnormal urinalysis consistent with UTI-this presentation of tender painful knee with worsening function and abnormal joint fluid analysis with crystal for uric acid positive could very well be due to combination of multiple pathologies happening together:             -Acute gouty arthritis with             -Secondary septic arthritis due to hematogenous seeding from the lung or urine versus             -Progression of osteoarthritis.  2-acute influenza A with secondary bacterial pneumonia  3-abnormal urinalysis with urine culture without any specific urinary symptoms  Roxanne Duncan MD  2/20/2025  07:13 EST    Parts of this note may be an electronic transcription/translation of spoken language to printed text using the Dragon dictation system.

## 2025-02-20 NOTE — CASE MANAGEMENT/SOCIAL WORK
Continued Stay Note  [facility]     Patient Name: Chrissy Gutierrez  MRN: [id number]  Today's Date: 2/20/2025    Admit Date: 2/10/2025    Plan: SNF- referrals pending- will need pre-cert   Discharge Plan       Row Name 02/20/25 1629       Plan    Plan Comments Spoke with [facility] and [facility] unable to accept. Spoke with [facility] and they do not have any beds. Unable to reach anyone at [facility] despite calling multiple times. Patient updated and agreeable to [facility] placing mass referrals. Multiple referrals placed. Will follow up in AM.                   Discharge Codes    No documentation.                 Expected Discharge Date and Time       Expected Discharge Date Expected Discharge Time    Feb 21, 2025               Elida Hayes RN

## 2025-02-20 NOTE — NURSING NOTE
Dr. Vieira gave approval for either a PICC line or midline, please place order when discharge placement obtained.

## 2025-02-20 NOTE — PROGRESS NOTES
"   LOS: 10 days     Chief Complaint/ Reason for encounter: CKD management    Subjective   02/14/25 : Patient is doing well today with no new complaints  Good appetite with no nausea or vomiting  No shortness of breath chest pain or edema  Voiding well with no dysuria  Denies new complaints    2/20: No new complaints, just waiting on rehab placement at this time  Needs IV access for outpatient antibiotics    Medical history reviewed:  History of Present Illness    Subjective    History taken from: Chart and patient/family as able    Vital Signs  Temp:  [96.8 °F (36 °C)-97.7 °F (36.5 °C)] 97.2 °F (36.2 °C)  Heart Rate:  [74-95] 95  Resp:  [16-18] 18  BP: (123-134)/(70-80) 133/80       Wt Readings from Last 1 Encounters:   02/10/25 0330 92.5 kg (204 lb)       Objective:  Vital signs: (most recent): Blood pressure 133/80, pulse 95, temperature 97.2 °F (36.2 °C), temperature source Oral, resp. rate 18, height 170.2 cm (67\"), weight 92.5 kg (204 lb), SpO2 95%.                Objective:  General Appearance:  Comfortable, obese, well-appearing, in no acute distress and not in pain.  HEENT: Mucous membranes moist  Lungs:  Normal effort and normal respiratory rate.  Breath sounds clear to auscultation: No rhonchi/Rales.  No  respiratory distress.   Heart:  S1, S2 normal.  No murmur.   Abdomen: Abdomen is soft, nontender/nondistended, + bowel sounds  Extremities: Trace edema of bilateral lower extremities  Skin:  Warm and dry with no rashes      Results Review:    Intake/Output:     Intake/Output Summary (Last 24 hours) at 2/20/2025 1713  Last data filed at 2/20/2025 1259  Gross per 24 hour   Intake 1866 ml   Output 500 ml   Net 1366 ml         DATA:  Radiology and Labs:  The following labs independently reviewed by me. Additional labs ordered for tomorrow a.m.  Interval notes, chart personally reviewed by me.   Old records independently reviewed showing CKD 3  Discussed with patient    Risk/ complexity of medical care/ " medical decision making moderate, diuretic management with CKD      Labs:   Recent Results (from the past 24 hours)   POC Glucose Once    Collection Time: 02/19/25  8:36 PM    Specimen: Blood   Result Value Ref Range    Glucose 396 (H) 70 - 130 mg/dL   POC Glucose Once    Collection Time: 02/19/25 10:24 PM    Specimen: Blood   Result Value Ref Range    Glucose 299 (H) 70 - 130 mg/dL   POC Glucose Once    Collection Time: 02/20/25  7:22 AM    Specimen: Blood   Result Value Ref Range    Glucose 138 (H) 70 - 130 mg/dL   POC Glucose Once    Collection Time: 02/20/25 11:25 AM    Specimen: Blood   Result Value Ref Range    Glucose 243 (H) 70 - 130 mg/dL   POC Glucose Once    Collection Time: 02/20/25  4:34 PM    Specimen: Blood   Result Value Ref Range    Glucose 372 (H) 70 - 130 mg/dL       Radiology:  Pertinent radiology studies were reviewed as described above      Medications have been reviewed separately in chart review medication tab      ASSESSMENT:  CKD stage IIIa, stable, near baseline    Bacterial pneumonia, on IV/p.o. antibiotics    Sepsis, unspecified organism, improving with antibiotic therapy    Influenza A  Chronic diastolic CHF, on oral diuretics     Type 2 diabetes mellitus with hyperglycemia, with long-term current use of insulin    Acute respiratory failure with hypoxia, improved, on room air           DISCUSSION/PLAN:   Creatinine level remains very stable today, near baseline creatinine of around 1.4-1.8  Clinically euvolemic on exam  Remains euvolemic on current oral Lasix dose: Continue  Antibiotics per infectious disease, dosed for GFR around 40  She would need a few more weeks of IV antibiotics.  I think her chance of progressing to ESRD is pretty low so okay to place midline immediately prior to discharge with prompt removal once IV antibiotics are complete  Her potassium remains stable off Lokelma  Stop sodium bicarb  Continue Jardiance  Discharge planning      Antwan Vieira MD  [facility]   Office phone number: [phone]  Answering service phone number: [phone]    02/20/25  17:13 EST    Dictation performed using Dragon dictation [facility]

## 2025-02-20 NOTE — DISCHARGE PLACEMENT REQUEST
"Chrissy Gutierrez (74 y.o. Female)       Date of Birth   1950    Social Security Number       Address   [address] [place] [address]    Home Phone   [phone]    MRN   [id number]       [facility]    Marital Status                               Admission Date   2/10/25    Admission Type   Emergency    Admitting Provider   Jed Magdaleno MD    Attending Provider   Silvestre Pierre MD    Department, Room/Bed   [address], P890/1       Discharge Date       Discharge Disposition       Discharge Destination                                 Attending Provider: Silvestre Pierre MD    Allergies: Ibuprofen    Isolation: Droplet   Infection: Influenza (02/10/25)   Code Status: CPR    Ht: 170.2 cm (67\")   Wt: 92.5 kg (204 lb)    Admission Cmt: None   Principal Problem: Bacterial pneumonia [J15.9]                   Active Insurance as of 2/10/2025       Primary Coverage       Payor Plan Insurance Group Employer/Plan Group    ANTHEM MEDICARE REPLACEMENT ANTHEM MED ADV HMO [id number]       Payor Plan Address Payor Plan Phone Number Payor Plan Fax Number Effective Dates    [address] [phone]  1/1/2025 - None Entered    [facility] [id number]         Subscriber Name Subscriber Birth Date Member ID       CHRISSY GUTIERREZ 1950 [id number]                     Emergency Contacts        (Rel.) Home Phone Work Phone Mobile Phone    HARIKA GUTIERREZ (Son) [phone] -- --                "

## 2025-02-20 NOTE — OP NOTE
Operative Note  Arvind Ibarra MD    [phone]    PATIENT NAME: Chrissy Gutierrez  MRN: [id number]  : 1950 AGE: 74 y.o. GENDER: female  DATE OF OPERATION: 2025  PREOPERATIVE DIAGNOSIS: left septic knee in the setting of UTI and upper respiratory infection   POSTOPERATIVE DIAGNOSIS: Same  OPERATION PERFORMED: left knee irrigation and debridement   SURGEON: Arvind Ibarra MD  Circulator: Cesia Reinoso RN  Scrub Person: Miya Lezama  ANESTHESIA: General  ASSISTANT: None   ESTIMATED BLOOD LOSS:  25cc  SPONGE AND NEEDLE COUNT: Correct  CPT For Billing: left  knee irrigation and debridement [id number]     PERTINENT FINDINGS: cloudy synovial fluid left knee     INDICATIONS:   74 year old female who was admitted to the hospital in respiratory distress and treated for upper respiratory infection with possible bacterial pneumonia. Additionally she has positive urine culture with e coli. Left knee has remained painful throughout her hospitalization and she has limited ROM with moderate/ large effusion and tenderness about the knee.  Left knee aspirated and return of purulent fluid, cultures have remained negative and no bacteria seen within the aspirate, but she has been on antibiotics. She does have uric acid crystals in the joint however 59k nucleated cells. I discussed with her that in the setting of infection elsewhere and with continued knee pain/elevated cell count I do think it is in her best interest to have the knee washed out to avoid possible septic joint.    I discussed with her operative and nonoperative treatment for left septic knee. The risks and benefits of both were discussed. Risks of surgery include bleeding, continued chronic infection, need for further surgery, continued pain about the knee, knee stiffness, damage to surrounding structures blood vessels, muscles, tendons, nerves, and intra articular structures about the knee, knee arthritis, incomplete resolution of symptoms,  blood clot, pulmonary embolism. Risks of anesthesia including but not limited to stroke, heart attack and even death were discussed. Patient wishes to proceed with left knee irrigation and debridement. All questions answered, no guarantees given.   DETAILS OF PROCEDURE:  The patient was met in the preoperative area. The site was marked. The consent and H&P were reviewed. The patient was then wheeled back to the operative suite and underwent anesthesia. The operative extremity was cleaned with sterile alcohol and prepped and draped in the usual sterile fashion. The circulating nurse conducted a preoperative timeout confirming the correct patient, operative site, and the procedure to be performed. All in the room were in agreement.       I began the procedure by making a superolateral portal to the knee joint with a scalpel and bovie electrocautery. arthrotomy was then done with scalpel with return of cloudy synovial fluid. Following this I made a separate outflow portal at the anteromedial aspect of the knee just adjacent to the medial aspect of the patella tendon at the soft spot of the knee. Scalpel and bovie were used. Arthrotomy was made here with scalpel into the notch of the femur. Hemostat was used to widen this portal. Two tissue samples were taken of the synovium and sent for culture. Three liters of normal saline were then irrigated throughout the knee until the fluid ran clear. All fluid was then removed from the knee with direct pressure and suction Yankauer. The wounds were then closed in layers and a dry dressing was applied.      The patient was awoken from anesthesia and taken to the recovery room in stable condition. There were no complications evident and the patient tolerated the procedure well.       Arvind Ibarra MD  Orthopaedic Surgery  [facility]  [phone]

## 2025-02-20 NOTE — PROGRESS NOTES
" LOS: 10 days     Name: Chrissy Gutierrez  Age: 74 y.o.  Sex: female  :  1950  MRN: [id number]         Primary Care Physician: Jai Steven MD    Subjective   Subjective  No new complaints or acute overnight events    Objective   Vital Signs  Temp:  [97 °F (36.1 °C)-97.7 °F (36.5 °C)] 97.4 °F (36.3 °C)  Heart Rate:  [74-85] 79  Resp:  [18] 18  BP: (121-134)/(74-80) 134/74  Body mass index is 31.95 kg/m².    Objective:  General Appearance:  Comfortable and in no acute distress.    Vital signs: (most recent): Blood pressure 134/74, pulse 79, temperature 97.4 °F (36.3 °C), temperature source Oral, resp. rate 18, height 170.2 cm (67\"), weight 92.5 kg (204 lb), SpO2 100%.    Lungs:  Normal effort and normal respiratory rate.  She is not in respiratory distress.  There are decreased breath sounds.    Heart: Normal rate.  Regular rhythm.    Abdomen: Abdomen is soft.  Bowel sounds are normal.   There is no abdominal tenderness.     Extremities: There is no dependent edema or local swelling.    Neurological: Patient is alert and oriented to person, place and time.    Skin:  Warm and dry.                Results Review:       I reviewed the patient's new clinical results.    Results from last 7 days   Lab Units 25  0908 02/16/25  0417 02/15/25  0531 25  0427   WBC 10*3/mm3 13.74* 13.47* 12.74* 11.70* 10.16 15.00*   HEMOGLOBIN g/dL 11.2* 11.1* 10.9* 10.6* 10.0* 9.7*   PLATELETS 10*3/mm3 601* 577* 575* 483* 485* 430     Results from last 7 days   Lab Units 25  0631 25  0909 25  0417 02/15/25  0531 25  0427   SODIUM mmol/L 138 138 138 139 137 137   POTASSIUM mmol/L 4.5 4.6 4.2 4.8 4.3 4.3   CHLORIDE mmol/L 100 100 99 102 102 104   CO2 mmol/L 24.9 23.1 24.7 22.9 22.0 19.0*   BUN mg/dL 74* 71* 65* 66* 55* 49*   CREATININE mg/dL 1.53* 1.37* 1.31* 1.49* 1.15* 1.43*   CALCIUM mg/dL 9.5 9.2 9.1 8.7 8.6 8.8   GLUCOSE mg/dL 158* 188* 223* 240* " 137* 136*     Results from last 7 days   Lab Units 02/15/25  0530   INR  1.16*             Scheduled Meds:   aspirin, 81 mg, Oral, BID  carvedilol, 12.5 mg, Oral, BID With Meals  cefTRIAXone, 2,000 mg, Intravenous, Q24H  empagliflozin, 25 mg, Oral, Daily  furosemide, 20 mg, Oral, Daily  glipizide, 5 mg, Oral, QAM AC  insulin glargine, 10 Units, Subcutaneous, Daily  insulin glargine, 10 Units, Subcutaneous, Nightly  insulin lispro, 3-14 Units, Subcutaneous, 4x Daily AC & at Bedtime  ipratropium-albuterol, 3 mL, Nebulization, Q6H While Awake - RT  predniSONE, 30 mg, Oral, Daily With Breakfast  sodium bicarbonate, 650 mg, Oral, TID  sodium chloride, 10 mL, Intravenous, Q12H      PRN Meds:     senna-docusate sodium **AND** polyethylene glycol **AND** bisacodyl **AND** bisacodyl    calcium carbonate    dextrose    dextrose    glucagon (human recombinant)    ipratropium-albuterol    methocarbamol    nitroglycerin    ondansetron ODT **OR** ondansetron    oxyCODONE    sodium chloride    sodium chloride  Continuous Infusions:       Assessment & Plan   Active Hospital Problems    Diagnosis  POA    **Bacterial pneumonia [J15.9]  Yes    Sepsis, unspecified organism [A41.9]  Yes    Influenza A [J10.1]  Yes    Acute pain of left knee [M25.562]  Yes    Type 2 diabetes mellitus with hyperglycemia, with long-term current use of insulin [E11.65, Z79.4]  Not Applicable    CKD (chronic kidney disease) stage 3, GFR 30-59 ml/min [N18.30]  Yes    Acute respiratory failure with hypoxia [J96.01]  Yes      Resolved Hospital Problems   No resolved problems to display.       Assessment & Plan    1.  Acute respiratory failure with hypoxia, bacterial pneumonia, recent history of flu infection, superimposed bacterial pneumonia. Treated with antibiotic including Rocephin and doxycycline.  No further need of antibiotics for pneumonia but she remains on these for possible septic arthritis of the knee as outlined below.     2.  Acute pain in the left  knee, S/P OR for debridement and washout.  ID recommends a 4-week course of IV antibiotics.  Patient now agreeable to IV antibiotics.  Having trouble finding a facility that will take her.  Continue prednisone taper.  If no facility can be found that we will take her with the IV antibiotics then ID has outlined a plan for oral antibiotics as a last resort once all other options have been exhausted.     3.  Acute on chronic kidney injury, renal function has improved, appears to be close to baseline.  Avoid nephrotoxic medications.  Nephrology on board and following.     4.  Diabetes mellitus.  Blood sugars overall reasonably controlled.  Will begin decreasing her Lantus regimen now that we are tapering steroids.     5.  Transaminitis.  Gastroenterology suspected muscle related injury.  LFTs trending down     6.  CODE STATUS is full code.       Await discharge planning      Expected Discharge Date: 2/21/2025; Expected Discharge Time:      Silvestre Pierre MD  [facility]  02/20/25  09:35 EST

## 2025-02-20 NOTE — PLAN OF CARE
Problem: Adult Inpatient Plan of Care  Goal: Plan of Care Review  Outcome: Progressing  Flowsheets (Taken 2/20/2025 1651)  Progress: improving  Outcome Evaluation: VSS, weaned oxygen to room air, sat in chair majority of the day, walked to the bathroom with PT, Dr. Ibarra  to change dressing, supplies at bedside, dc when placement found.  Plan of Care Reviewed With: patient  Goal: Patient-Specific Goal (Individualized)  Outcome: Progressing  Goal: Absence of Hospital-Acquired Illness or Injury  Outcome: Progressing  Intervention: Identify and Manage Fall Risk  Recent Flowsheet Documentation  Taken 2/20/2025 1600 by Sherry Barroso RN  Safety Promotion/Fall Prevention:   activity supervised   assistive device/personal items within reach   clutter free environment maintained   fall prevention program maintained   nonskid shoes/slippers when out of bed   room organization consistent   safety round/check completed  Taken 2/20/2025 1341 by Sherry Barroso RN  Safety Promotion/Fall Prevention:   activity supervised   assistive device/personal items within reach   clutter free environment maintained   fall prevention program maintained   nonskid shoes/slippers when out of bed   room organization consistent   safety round/check completed  Taken 2/20/2025 1200 by Sherry Barroso RN  Safety Promotion/Fall Prevention:   activity supervised   assistive device/personal items within reach   clutter free environment maintained   fall prevention program maintained   nonskid shoes/slippers when out of bed   room organization consistent   safety round/check completed  Taken 2/20/2025 1000 by Sherry Barroso RN  Safety Promotion/Fall Prevention:   activity supervised   assistive device/personal items within reach   clutter free environment maintained   fall prevention program maintained   nonskid shoes/slippers when out of bed   room organization consistent   safety round/check completed  Taken 2/20/2025 0836 by  Ernspiker, Sherry S, RN  Safety Promotion/Fall Prevention:   activity supervised   assistive device/personal items within reach   clutter free environment maintained   fall prevention program maintained   nonskid shoes/slippers when out of bed   room organization consistent   safety round/check completed  Intervention: Prevent Skin Injury  Recent Flowsheet Documentation  Taken 2/20/2025 1600 by Sherry Barroso RN  Body Position: legs elevated  Taken 2/20/2025 1341 by Sherry Barroso RN  Body Position: legs elevated  Taken 2/20/2025 1000 by Sherry Barroso RN  Body Position: supine  Taken 2/20/2025 0836 by Sherry Barroso RN  Body Position: supine  Goal: Optimal Comfort and Wellbeing  Outcome: Progressing  Goal: Readiness for Transition of Care  Outcome: Progressing     Problem: Fall Injury Risk  Goal: Absence of Fall and Fall-Related Injury  Outcome: Progressing  Intervention: Promote Injury-Free Environment  Recent Flowsheet Documentation  Taken 2/20/2025 1600 by Sherry Barroso RN  Safety Promotion/Fall Prevention:   activity supervised   assistive device/personal items within reach   clutter free environment maintained   fall prevention program maintained   nonskid shoes/slippers when out of bed   room organization consistent   safety round/check completed  Taken 2/20/2025 1341 by Sherry Barroso RN  Safety Promotion/Fall Prevention:   activity supervised   assistive device/personal items within reach   clutter free environment maintained   fall prevention program maintained   nonskid shoes/slippers when out of bed   room organization consistent   safety round/check completed  Taken 2/20/2025 1200 by Sherry Barroso RN  Safety Promotion/Fall Prevention:   activity supervised   assistive device/personal items within reach   clutter free environment maintained   fall prevention program maintained   nonskid shoes/slippers when out of bed   room organization consistent   safety round/check  completed  Taken 2/20/2025 1000 by Sherry Barroso RN  Safety Promotion/Fall Prevention:   activity supervised   assistive device/personal items within reach   clutter free environment maintained   fall prevention program maintained   nonskid shoes/slippers when out of bed   room organization consistent   safety round/check completed  Taken 2/20/2025 0836 by Sherry Barroso RN  Safety Promotion/Fall Prevention:   activity supervised   assistive device/personal items within reach   clutter free environment maintained   fall prevention program maintained   nonskid shoes/slippers when out of bed   room organization consistent   safety round/check completed     Problem: Sepsis/Septic Shock  Goal: Optimal Coping  Outcome: Progressing  Goal: Absence of Bleeding  Outcome: Progressing  Goal: Blood Glucose Level Within Target Range  Outcome: Progressing  Goal: Absence of Infection Signs and Symptoms  Outcome: Progressing  Intervention: Promote Recovery  Recent Flowsheet Documentation  Taken 2/20/2025 1600 by Sherry Barroso RN  Activity Management: up in chair  Taken 2/20/2025 1200 by Sherry Barroso RN  Activity Management: up in chair  Goal: Optimal Nutrition Delivery  Outcome: Progressing     Problem: Skin Injury Risk Increased  Goal: Skin Health and Integrity  Outcome: Progressing  Intervention: Optimize Skin Protection  Recent Flowsheet Documentation  Taken 2/20/2025 1600 by Sherry Barroso RN  Activity Management: up in chair  Taken 2/20/2025 1200 by Sherry Barroso RN  Activity Management: up in chair  Taken 2/20/2025 1000 by Sherry Barroso RN  Head of Bed (HOB) Positioning: HOB at 30-45 degrees  Taken 2/20/2025 0836 by Sherry Barroso RN  Head of Bed (HOB) Positioning: HOB at 30-45 degrees   Goal Outcome Evaluation:  Plan of Care Reviewed With: patient        Progress: improving  Outcome Evaluation: VSS, weaned oxygen to room air, sat in chair majority of the day, walked to the  bathroom with PT, Dr. Ibarra  to change dressing, supplies at bedside, dc when placement found.

## 2025-02-21 LAB
ANION GAP SERPL CALCULATED.3IONS-SCNC: 16.9 MMOL/L (ref 5–15)
BUN SERPL-MCNC: 60 MG/DL (ref 8–23)
BUN/CREAT SERPL: 41.4 (ref 7–25)
CALCIUM SPEC-SCNC: 8.8 MG/DL (ref 8.6–10.5)
CHLORIDE SERPL-SCNC: 94 MMOL/L (ref 98–107)
CO2 SERPL-SCNC: 23.1 MMOL/L (ref 22–29)
CREAT SERPL-MCNC: 1.45 MG/DL (ref 0.57–1)
EGFRCR SERPLBLD CKD-EPI 2021: 37.9 ML/MIN/1.73
GLUCOSE BLDC GLUCOMTR-MCNC: 103 MG/DL (ref 70–130)
GLUCOSE BLDC GLUCOMTR-MCNC: 368 MG/DL (ref 70–130)
GLUCOSE BLDC GLUCOMTR-MCNC: 419 MG/DL (ref 70–130)
GLUCOSE SERPL-MCNC: 331 MG/DL (ref 65–99)
POTASSIUM SERPL-SCNC: 4.4 MMOL/L (ref 3.5–5.2)
SODIUM SERPL-SCNC: 134 MMOL/L (ref 136–145)

## 2025-02-21 PROCEDURE — 63710000001 INSULIN LISPRO (HUMAN) PER 5 UNITS: Performed by: INTERNAL MEDICINE

## 2025-02-21 PROCEDURE — 82948 REAGENT STRIP/BLOOD GLUCOSE: CPT

## 2025-02-21 PROCEDURE — 94799 UNLISTED PULMONARY SVC/PX: CPT

## 2025-02-21 PROCEDURE — 94760 N-INVAS EAR/PLS OXIMETRY 1: CPT

## 2025-02-21 PROCEDURE — 80048 BASIC METABOLIC PNL TOTAL CA: CPT | Performed by: INTERNAL MEDICINE

## 2025-02-21 PROCEDURE — 25010000002 CEFTRIAXONE PER 250 MG: Performed by: INTERNAL MEDICINE

## 2025-02-21 PROCEDURE — 94761 N-INVAS EAR/PLS OXIMETRY MLT: CPT

## 2025-02-21 PROCEDURE — 63710000001 INSULIN GLARGINE PER 5 UNITS: Performed by: INTERNAL MEDICINE

## 2025-02-21 PROCEDURE — 97110 THERAPEUTIC EXERCISES: CPT

## 2025-02-21 PROCEDURE — 63710000001 PREDNISONE PER 5 MG: Performed by: INTERNAL MEDICINE

## 2025-02-21 PROCEDURE — 94664 DEMO&/EVAL PT USE INHALER: CPT

## 2025-02-21 RX ORDER — PREDNISONE 20 MG/1
20 TABLET ORAL
Status: DISCONTINUED | OUTPATIENT
Start: 2025-02-22 | End: 2025-02-23

## 2025-02-21 RX ADMIN — OXYCODONE HYDROCHLORIDE 5 MG: 5 TABLET ORAL at 18:25

## 2025-02-21 RX ADMIN — IPRATROPIUM BROMIDE AND ALBUTEROL SULFATE 3 ML: 2.5; .5 SOLUTION RESPIRATORY (INHALATION) at 19:23

## 2025-02-21 RX ADMIN — FUROSEMIDE 20 MG: 20 TABLET ORAL at 09:08

## 2025-02-21 RX ADMIN — INSULIN GLARGINE 10 UNITS: 100 INJECTION, SOLUTION SUBCUTANEOUS at 21:53

## 2025-02-21 RX ADMIN — GLIPIZIDE 5 MG: 5 TABLET ORAL at 09:08

## 2025-02-21 RX ADMIN — ASPIRIN 81 MG: 81 TABLET, COATED ORAL at 21:53

## 2025-02-21 RX ADMIN — Medication 10 ML: at 09:09

## 2025-02-21 RX ADMIN — CEFTRIAXONE 2000 MG: 2 INJECTION, POWDER, FOR SOLUTION INTRAMUSCULAR; INTRAVENOUS at 09:08

## 2025-02-21 RX ADMIN — CARVEDILOL 12.5 MG: 12.5 TABLET, FILM COATED ORAL at 18:25

## 2025-02-21 RX ADMIN — EMPAGLIFLOZIN 25 MG: 25 TABLET, FILM COATED ORAL at 09:08

## 2025-02-21 RX ADMIN — IPRATROPIUM BROMIDE AND ALBUTEROL SULFATE 3 ML: 2.5; .5 SOLUTION RESPIRATORY (INHALATION) at 11:55

## 2025-02-21 RX ADMIN — PREDNISONE 30 MG: 10 TABLET ORAL at 09:14

## 2025-02-21 RX ADMIN — INSULIN LISPRO 12 UNITS: 100 INJECTION, SOLUTION INTRAVENOUS; SUBCUTANEOUS at 21:53

## 2025-02-21 RX ADMIN — INSULIN LISPRO 14 UNITS: 100 INJECTION, SOLUTION INTRAVENOUS; SUBCUTANEOUS at 18:25

## 2025-02-21 RX ADMIN — ASPIRIN 81 MG: 81 TABLET, COATED ORAL at 09:08

## 2025-02-21 RX ADMIN — CARVEDILOL 12.5 MG: 12.5 TABLET, FILM COATED ORAL at 09:08

## 2025-02-21 NOTE — PAYOR COMM NOTE
"Chrissy Gutierrez (74 y.o. Female)     ATTN: NURSE REVIEWER  [facility]   REF# [id number]  PLS REPLY TO VEL NGUYEN [phone] OR FAX# [phone]      Date of Birth   1950    Social Security Number       Address   [address] [place], [place] [address]    Home Phone   [phone]    MRN   [id number]       Jainism   Presbyterian    Marital Status                               Admission Date   2/10/25    Admission Type   Emergency    Admitting Provider   Jed Magdaleno MD    Attending Provider   Silvestre Pierre MD    Department, Room/Bed   [address], 90/1       Discharge Date       Discharge Disposition       Discharge Destination                                 Attending Provider: Silvestre Pierre MD    Allergies: Ibuprofen    Isolation: Droplet   Infection: Influenza (02/10/25)   Code Status: CPR    Ht: 170.2 cm (67\")   Wt: 92.5 kg (204 lb)    Admission Cmt: None   Principal Problem: Bacterial pneumonia [J15.9]                   Active Insurance as of 2/10/2025       Primary Coverage       Payor Plan Insurance Group Employer/Plan Group    ANTHEM MEDICARE REPLACEMENT ANTHEM MED ADV HMO [id number]       Payor Plan Address Payor Plan Phone Number Payor Plan Fax Number Effective Dates    [address] [phone]  1/1/2025 - None Entered    [facility] [id number]         Subscriber Name Subscriber Birth Date Member ID       CHRISSY GUTIERREZ 1950 [id number]                     Emergency Contacts        (Rel.) Home Phone Work Phone Mobile Phone    HARIKA GUTIERREZ (Son) [phone] -- --              Facility-Administered Medications as of 2/20/2025   Medication Dose Route Frequency Provider Last Rate Last Admin    aspirin EC tablet 81 mg  81 mg Oral BID Arvind Ibarra MD   81 mg at 02/20/25 0836    sennosides-docusate (PERICOLACE) 8.6-50 MG per tablet 2 tablet  2 tablet Oral BID PRN Arvind Ibarra " MD DIDIER        And    polyethylene glycol (MIRALAX) packet 17 g  17 g Oral Daily PRN Arvind Ibarra MD        And    bisacodyl (DULCOLAX) EC tablet 5 mg  5 mg Oral Daily PRN Arvind Ibarra MD        And    bisacodyl (DULCOLAX) suppository 10 mg  10 mg Rectal Daily PRN Arvind Ibarra MD        calcium carbonate (TUMS) chewable tablet 500 mg (200 mg elemental)  2 tablet Oral BID PRN Arvind Ibarra MD        carvedilol (COREG) tablet 12.5 mg  12.5 mg Oral BID With Meals Arvind Ibarra MD   12.5 mg at 02/20/25 1705    [COMPLETED] ceFAZolin 2000 mg IVPB in 100 mL NS (MBP)  2,000 mg Intravenous Q8H Arvind Ibarra MD   2,000 mg at 02/13/25 0803    [COMPLETED] cefTRIAXone (ROCEPHIN) 2,000 mg in sodium chloride 0.9 % 100 mL MBP  2,000 mg Intravenous Once Edelmira Siddiqi PA-C   Stopped at 02/10/25 0610    cefTRIAXone (ROCEPHIN) 2,000 mg in sodium chloride 0.9 % 100 mL MBP  2,000 mg Intravenous Q24H Roxanne Duncan  mL/hr at 02/20/25 0836 2,000 mg at 02/20/25 0836    dextrose (D50W) (25 g/50 mL) IV injection 25 g  25 g Intravenous Q15 Min PRN Arvind Ibarra MD        dextrose (GLUTOSE) oral gel 15 g  15 g Oral Q15 Min PRN Arvind Ibarra MD        [COMPLETED] doxycycline (MONODOX) capsule 100 mg  100 mg Oral Q12H Arvind Ibarra MD   100 mg at 02/17/25 0904    empagliflozin (JARDIANCE) tablet 25 mg  25 mg Oral Daily Arvind Ibarra MD   25 mg at 02/20/25 0836    [COMPLETED] fentaNYL citrate (PF) (SUBLIMAZE) injection 50 mcg  50 mcg Intravenous Once PRN Zaki Vo MD   50 mcg at 02/12/25 1207    furosemide (LASIX) tablet 20 mg  20 mg Oral Daily Antwna Vieira MD   20 mg at 02/20/25 0835    [COMPLETED] furosemide (LASIX) tablet 40 mg  40 mg Oral Once Mira Johns APRN   40 mg at 02/12/25 4772    glipizide (GLUCOTROL) tablet 5 mg  5 mg Oral QAM AC Arvind Ibarra MD   5 mg at 02/20/25 0836    glucagon (GLUCAGEN) injection 1 mg  1 mg Intramuscular Q15 Min PRN  Arvind Ibarra MD        [COMPLETED] guaiFENesin-codeine (ROMILAR-AC) solution 5 mL  5 mL Oral Once Jai Ledesma MD   5 mL at 02/10/25 0602    insulin glargine (LANTUS, SEMGLEE) injection 10 Units  10 Units Subcutaneous Daily Arvind Ibarra MD   10 Units at 25 0836    insulin glargine (LANTUS, SEMGLEE) injection 10 Units  10 Units Subcutaneous Nightly Silvestre Pierre MD        insulin lispro (HUMALOG/ADMELOG) injection 3-14 Units  3-14 Units Subcutaneous 4x Daily AC & at Bedtime Silvestre Pierre MD   12 Units at 25 1705    [COMPLETED] insulin lispro (HUMALOG/ADMELOG) injection 5 Units  5 Units Subcutaneous Once Damaris Geiger APRN   5 Units at 02/15/25 2323    ipratropium-albuterol (DUO-NEB) nebulizer solution 3 mL  3 mL Nebulization Q4H PRN Avrind Ibarra MD        ipratropium-albuterol (DUO-NEB) nebulizer solution 3 mL  3 mL Nebulization Q6H While Awake - RT Arvind Ibarra MD   3 mL at 25 1934    [COMPLETED] lactated ringers bolus 1,000 mL  1,000 mL Intravenous Once Edelmira Siddiqi PA-C 1,000 mL/hr at 02/10/25 0533 1,000 mL at 02/10/25 0533    methocarbamol (ROBAXIN) tablet 500 mg  500 mg Oral 4x Daily PRN Arvind Ibarra MD   500 mg at 25 2106    nitroglycerin (NITROSTAT) SL tablet 0.4 mg  0.4 mg Sublingual Q5 Min PRN Arvind Ibarra MD        ondansetron ODT (ZOFRAN-ODT) disintegrating tablet 4 mg  4 mg Oral Q6H PRN Arvind Ibarra MD        Or    ondansetron (ZOFRAN) injection 4 mg  4 mg Intravenous Q6H PRN Arvind Ibarra MD        [] oxyCODONE (ROXICODONE) immediate release tablet 5 mg  5 mg Oral Q8H PRN Raleigh Boateng MD   5 mg at 25 0845    oxyCODONE (ROXICODONE) immediate release tablet 5 mg  5 mg Oral Q8H PRN Mira Johns APRN   5 mg at 25 1705    predniSONE (DELTASONE) tablet 30 mg  30 mg Oral Daily With Breakfast Silvestre Pierre MD   30 mg at 25 0835    sodium chloride  0.9 % flush 10 mL  10 mL Intravenous Q12H Arvind Ibarra MD   10 mL at 25 0836    sodium chloride 0.9 % flush 10 mL  10 mL Intravenous PRN Arvind Ibarra MD        sodium chloride 0.9 % infusion 40 mL  40 mL Intravenous PRN Arvind Ibarra MD        [] sodium chloride 0.9 % infusion  100 mL/hr Intravenous Continuous Guadalupe Lugo APRN 100 mL/hr at 25 0414 100 mL/hr at 25 0414    sodium chloride nasal spray 1 spray  1 spray Each Nare PRSilvestre Schmidt MD         Lab Results (last 24 hours)       Procedure Component Value Units Date/Time    POC Glucose Once [[id number]]  (Abnormal) Collected: 25 1634    Specimen: Blood Updated: 25 1636     Glucose 372 mg/dL     POC Glucose Once [[id number]]  (Abnormal) Collected: 25 1125    Specimen: Blood Updated: 25 1127     Glucose 243 mg/dL     POC Glucose Once [[id number]]  (Abnormal) Collected: 25 0722    Specimen: Blood Updated: 25 0724     Glucose 138 mg/dL     POC Glucose Once [[id number]]  (Abnormal) Collected: 25    Specimen: Blood Updated: 25     Glucose 299 mg/dL     POC Glucose Once [[id number]]  (Abnormal) Collected: 25    Specimen: Blood Updated: 25     Glucose 396 mg/dL           Imaging Results (Last 24 Hours)       ** No results found for the last 24 hours. **          ECG/EMG Results (last 24 hours)       Procedure Component Value Units Date/Time    Telemetry Scan [[id number]] Resulted: 02/10/25     Updated: 25 1405          Operative/Procedure Notes (last 24 hours)  Notes from 25 193 through 25 193   No notes of this type exist for this encounter.          Physician Progress Notes (last 48 hours)        Antwan Vieira MD at 25 4417             LOS: 10 days     Chief Complaint/ Reason for encounter: CKD management    Subjective   25 : Patient is doing well today with no new complaints  Good  "appetite with no nausea or vomiting  No shortness of breath chest pain or edema  Voiding well with no dysuria  Denies new complaints    2/20: No new complaints, just waiting on rehab placement at this time  Needs IV access for outpatient antibiotics    Medical history reviewed:  History of Present Illness    Subjective    History taken from: Chart and patient/family as able    Vital Signs  Temp:  [96.8 °F (36 °C)-97.7 °F (36.5 °C)] 97.2 °F (36.2 °C)  Heart Rate:  [74-95] 95  Resp:  [16-18] 18  BP: (123-134)/(70-80) 133/80       Wt Readings from Last 1 Encounters:   02/10/25 0330 92.5 kg (204 lb)       Objective:  Vital signs: (most recent): Blood pressure 133/80, pulse 95, temperature 97.2 °F (36.2 °C), temperature source Oral, resp. rate 18, height 170.2 cm (67\"), weight 92.5 kg (204 lb), SpO2 95%.                Objective:  General Appearance:  Comfortable, obese, well-appearing, in no acute distress and not in pain.  HEENT: Mucous membranes moist  Lungs:  Normal effort and normal respiratory rate.  Breath sounds clear to auscultation: No rhonchi/Rales.  No  respiratory distress.   Heart:  S1, S2 normal.  No murmur.   Abdomen: Abdomen is soft, nontender/nondistended, + bowel sounds  Extremities: Trace edema of bilateral lower extremities  Skin:  Warm and dry with no rashes      Results Review:    Intake/Output:     Intake/Output Summary (Last 24 hours) at 2/20/2025 1713  Last data filed at 2/20/2025 1259  Gross per 24 hour   Intake 1866 ml   Output 500 ml   Net 1366 ml         DATA:  Radiology and Labs:  The following labs independently reviewed by me. Additional labs ordered for tomorrow a.m.  Interval notes, chart personally reviewed by me.   Old records independently reviewed showing CKD 3  Discussed with patient    Risk/ complexity of medical care/ medical decision making moderate, diuretic management with CKD      Labs:   Recent Results (from the past 24 hours)   POC Glucose Once    Collection Time: 02/19/25  " 8:36 PM    Specimen: Blood   Result Value Ref Range    Glucose 396 (H) 70 - 130 mg/dL   POC Glucose Once    Collection Time: 02/19/25 10:24 PM    Specimen: Blood   Result Value Ref Range    Glucose 299 (H) 70 - 130 mg/dL   POC Glucose Once    Collection Time: 02/20/25  7:22 AM    Specimen: Blood   Result Value Ref Range    Glucose 138 (H) 70 - 130 mg/dL   POC Glucose Once    Collection Time: 02/20/25 11:25 AM    Specimen: Blood   Result Value Ref Range    Glucose 243 (H) 70 - 130 mg/dL   POC Glucose Once    Collection Time: 02/20/25  4:34 PM    Specimen: Blood   Result Value Ref Range    Glucose 372 (H) 70 - 130 mg/dL       Radiology:  Pertinent radiology studies were reviewed as described above      Medications have been reviewed separately in chart review medication tab      ASSESSMENT:  CKD stage IIIa, stable, near baseline    Bacterial pneumonia, on IV/p.o. antibiotics    Sepsis, unspecified organism, improving with antibiotic therapy    Influenza A  Chronic diastolic CHF, on oral diuretics     Type 2 diabetes mellitus with hyperglycemia, with long-term current use of insulin    Acute respiratory failure with hypoxia, improved, on room air           DISCUSSION/PLAN:   Creatinine level remains very stable today, near baseline creatinine of around 1.4-1.8  Clinically euvolemic on exam  Remains euvolemic on current oral Lasix dose: Continue  Antibiotics per infectious disease, dosed for GFR around 40  She would need a few more weeks of IV antibiotics.  I think her chance of progressing to ESRD is pretty low so okay to place midline immediately prior to discharge with prompt removal once IV antibiotics are complete  Her potassium remains stable off Lokelma  Stop sodium bicarb  Continue Jardiance  Discharge planning      Antwan Vieira MD  [facility]   Office phone number: [phone]  Answering service phone number: [phone]    02/20/25  17:13 EST    Dictation performed using Dragon dictation  "software      Electronically signed by Antwan Vieira MD at 25 1719       Silvestre Pierre MD at 25 0984           LOS: 10 days     Name: Chrissy Gutierrez  Age: 74 y.o.  Sex: female  :  1950  MRN: [id number]         Primary Care Physician: Jai Steven MD    Subjective   Subjective  No new complaints or acute overnight events    Objective   Vital Signs  Temp:  [97 °F (36.1 °C)-97.7 °F (36.5 °C)] 97.4 °F (36.3 °C)  Heart Rate:  [74-85] 79  Resp:  [18] 18  BP: (121-134)/(74-80) 134/74  Body mass index is 31.95 kg/m².    Objective:  General Appearance:  Comfortable and in no acute distress.    Vital signs: (most recent): Blood pressure 134/74, pulse 79, temperature 97.4 °F (36.3 °C), temperature source Oral, resp. rate 18, height 170.2 cm (67\"), weight 92.5 kg (204 lb), SpO2 100%.    Lungs:  Normal effort and normal respiratory rate.  She is not in respiratory distress.  There are decreased breath sounds.    Heart: Normal rate.  Regular rhythm.    Abdomen: Abdomen is soft.  Bowel sounds are normal.   There is no abdominal tenderness.     Extremities: There is no dependent edema or local swelling.    Neurological: Patient is alert and oriented to person, place and time.    Skin:  Warm and dry.                Results Review:       I reviewed the patient's new clinical results.    Results from last 7 days   Lab Units 25  0631 25  0908 257 02/15/25  0531 25  0427   WBC 10*3/mm3 13.74* 13.47* 12.74* 11.70* 10.16 15.00*   HEMOGLOBIN g/dL 11.2* 11.1* 10.9* 10.6* 10.0* 9.7*   PLATELETS 10*3/mm3 601* 577* 575* 483* 485* 430     Results from last 7 days   Lab Units 25  04425  0631 25  0909 25  0417 02/15/25  0531 25  0427   SODIUM mmol/L 138 138 138 139 137 137   POTASSIUM mmol/L 4.5 4.6 4.2 4.8 4.3 4.3   CHLORIDE mmol/L 100 100 99 102 102 104   CO2 mmol/L 24.9 23.1 24.7 22.9 22.0 19.0*   BUN mg/dL 74* 71* 65* " 66* 55* 49*   CREATININE mg/dL 1.53* 1.37* 1.31* 1.49* 1.15* 1.43*   CALCIUM mg/dL 9.5 9.2 9.1 8.7 8.6 8.8   GLUCOSE mg/dL 158* 188* 223* 240* 137* 136*     Results from last 7 days   Lab Units 02/15/25  0530   INR  1.16*             Scheduled Meds:   aspirin, 81 mg, Oral, BID  carvedilol, 12.5 mg, Oral, BID With Meals  cefTRIAXone, 2,000 mg, Intravenous, Q24H  empagliflozin, 25 mg, Oral, Daily  furosemide, 20 mg, Oral, Daily  glipizide, 5 mg, Oral, QAM AC  insulin glargine, 10 Units, Subcutaneous, Daily  insulin glargine, 10 Units, Subcutaneous, Nightly  insulin lispro, 3-14 Units, Subcutaneous, 4x Daily AC & at Bedtime  ipratropium-albuterol, 3 mL, Nebulization, Q6H While Awake - RT  predniSONE, 30 mg, Oral, Daily With Breakfast  sodium bicarbonate, 650 mg, Oral, TID  sodium chloride, 10 mL, Intravenous, Q12H      PRN Meds:     senna-docusate sodium **AND** polyethylene glycol **AND** bisacodyl **AND** bisacodyl    calcium carbonate    dextrose    dextrose    glucagon (human recombinant)    ipratropium-albuterol    methocarbamol    nitroglycerin    ondansetron ODT **OR** ondansetron    oxyCODONE    sodium chloride    sodium chloride  Continuous Infusions:       Assessment & Plan   Active Hospital Problems    Diagnosis  POA    **Bacterial pneumonia [J15.9]  Yes    Sepsis, unspecified organism [A41.9]  Yes    Influenza A [J10.1]  Yes    Acute pain of left knee [M25.562]  Yes    Type 2 diabetes mellitus with hyperglycemia, with long-term current use of insulin [E11.65, Z79.4]  Not Applicable    CKD (chronic kidney disease) stage 3, GFR 30-59 ml/min [N18.30]  Yes    Acute respiratory failure with hypoxia [J96.01]  Yes      Resolved Hospital Problems   No resolved problems to display.       Assessment & Plan    1.  Acute respiratory failure with hypoxia, bacterial pneumonia, recent history of flu infection, superimposed bacterial pneumonia. Treated with antibiotic including Rocephin and doxycycline.  No further need of  antibiotics for pneumonia but she remains on these for possible septic arthritis of the knee as outlined below.     2.  Acute pain in the left knee, S/P OR for debridement and washout.  ID recommends a 4-week course of IV antibiotics.  Patient now agreeable to IV antibiotics.  Having trouble finding a facility that will take her.  Continue prednisone taper.  If no facility can be found that we will take her with the IV antibiotics then ID has outlined a plan for oral antibiotics as a last resort once all other options have been exhausted.     3.  Acute on chronic kidney injury, renal function has improved, appears to be close to baseline.  Avoid nephrotoxic medications.  Nephrology on board and following.     4.  Diabetes mellitus.  Blood sugars overall reasonably controlled.  Will begin decreasing her Lantus regimen now that we are tapering steroids.     5.  Transaminitis.  Gastroenterology suspected muscle related injury.  LFTs trending down     6.  CODE STATUS is full code.       Await discharge planning      Expected Discharge Date: 2025; Expected Discharge Time:      Silvestre Pierre MD  [facility]  25  09:35 EST      Electronically signed by Silvestre Pierre MD at 25 0936       Roxanne Duncan MD at 25 0713            Infectious Diseases Progress Note    Roxanne Duncan MD     [facility]  Los: 10 days  Patient Identification:  Name: Chrissy Gutierrez  Age: 74 y.o.  Sex: female  :  1950  MRN: [id number]         Primary Care Physician: Jai Steven MD        Subjective: Continues to improve denies any fever and chills.  Tolerating antibiotics without any problem.  Interval History: See consultation note.    Objective:    Scheduled Meds:aspirin, 81 mg, Oral, BID  carvedilol, 12.5 mg, Oral, BID With Meals  cefTRIAXone, 2,000 mg, Intravenous, Q24H  empagliflozin, 25 mg, Oral, Daily  furosemide, 20 mg, Oral, Daily  glipizide, 5  "mg, Oral, QAM AC  insulin glargine, 10 Units, Subcutaneous, Daily  insulin glargine, 15 Units, Subcutaneous, Nightly  insulin lispro, 3-14 Units, Subcutaneous, 4x Daily AC & at Bedtime  ipratropium-albuterol, 3 mL, Nebulization, Q6H While Awake - RT  predniSONE, 30 mg, Oral, Daily With Breakfast  sodium bicarbonate, 650 mg, Oral, TID  sodium chloride, 10 mL, Intravenous, Q12H      Continuous Infusions:     Vital signs in last 24 hours:  Temp:  [97 °F (36.1 °C)-97.7 °F (36.5 °C)] 97.4 °F (36.3 °C)  Heart Rate:  [74-85] 74  Resp:  [16-18] 18  BP: (114-134)/(66-80) 134/74    Intake/Output:    Intake/Output Summary (Last 24 hours) at 2/20/2025 0713  Last data filed at 2/20/2025 0643  Gross per 24 hour   Intake 1418 ml   Output 500 ml   Net 918 ml       Exam:  /74 (BP Location: Right arm, Patient Position: Lying)   Pulse 74   Temp 97.4 °F (36.3 °C) (Oral)   Resp 18   Ht 170.2 cm (67\")   Wt 92.5 kg (204 lb)   SpO2 98%   BMI 31.95 kg/m²   Patient is examined using the personal protective equipment as per guidelines from infection control for this particular patient as enacted.  Hand washing was performed before and after patient interaction.  General Appearance:    Alert, cooperative, no distress, AAOx3                          Head:    Normocephalic, without obvious abnormality, atraumatic                           Eyes:    PERRL, conjunctivae/corneas clear, EOM's intact, both eyes                         Throat:   Lips, tongue, gums normal; oral mucosa pink and moist                           Neck:   Supple, symmetrical, trachea midline, no JVD                         Lungs:    Clear to auscultation bilaterally, respirations unlabored                 Chest Wall:    No tenderness or deformity                          Heart:  S1-S2 regular                  Abdomen:   Soft nontender                 Extremities: Left knee and lower extremities wrapped in the Ace wrap.                        Pulses:   Pulses " palpable in all extremities                            Skin:   Skin is warm and dry,  no rashes or palpable lesions                  Neurologic: Alert and oriented x 3       Data Review:    I reviewed the patient's new clinical results.  Results from last 7 days   Lab Units 02/19/25  0442 02/18/25  0631 02/17/25  0908 02/16/25  0417 02/15/25  0531 02/14/25  0427   WBC 10*3/mm3 13.74* 13.47* 12.74* 11.70* 10.16 15.00*   HEMOGLOBIN g/dL 11.2* 11.1* 10.9* 10.6* 10.0* 9.7*   PLATELETS 10*3/mm3 601* 577* 575* 483* 485* 430     Results from last 7 days   Lab Units 02/19/25  0442 02/18/25  0631 02/17/25  0909 02/16/25  0417 02/15/25  0531 02/14/25  0427   SODIUM mmol/L 138 138 138 139 137 137   POTASSIUM mmol/L 4.5 4.6 4.2 4.8 4.3 4.3   CHLORIDE mmol/L 100 100 99 102 102 104   CO2 mmol/L 24.9 23.1 24.7 22.9 22.0 19.0*   BUN mg/dL 74* 71* 65* 66* 55* 49*   CREATININE mg/dL 1.53* 1.37* 1.31* 1.49* 1.15* 1.43*   CALCIUM mg/dL 9.5 9.2 9.1 8.7 8.6 8.8   GLUCOSE mg/dL 158* 188* 223* 240* 137* 136*     Microbiology Results (last 10 days)       Procedure Component Value - Date/Time    Tissue / Bone Culture - Tissue, Knee, Left [[id number]] Collected: 02/12/25 1341    Lab Status: Final result Specimen: Tissue from Knee, Left Updated: 02/15/25 0726     Tissue Culture No growth at 3 days     Gram Stain No WBCs or organisms seen    Anaerobic Culture - Synovial Fluid, Knee, Left [[id number]]  (Normal) Collected: 02/12/25 1327    Lab Status: Final result Specimen: Synovial Fluid from Knee, Left Updated: 02/17/25 0710     Anaerobic Culture No anaerobes isolated at 5 days    Body Fluid Culture - Synovial Fluid, Knee, Left [[id number]] Collected: 02/12/25 1327    Lab Status: Final result Specimen: Synovial Fluid from Knee, Left Updated: 02/17/25 0742     Body Fluid Culture No growth at 5 days     Gram Stain Many (4+) WBCs seen      No organisms seen    Body Fluid Culture - Synovial Fluid, Knee, Left [[id number]] Collected: 02/11/25 1748    Lab  Status: Final result Specimen: Synovial Fluid from Knee, Left Updated: 02/16/25 0620     Body Fluid Culture No growth at 5 days     Gram Stain Moderate (3+) WBCs seen      No organisms seen    Anaerobic Culture - Synovial Fluid, Knee, Left [[id number]]  (Normal) Collected: 02/11/25 1745    Lab Status: Final result Specimen: Synovial Fluid from Knee, Left Updated: 02/16/25 0701     Anaerobic Culture No anaerobes isolated at 5 days              Assessment:    Bacterial pneumonia    Sepsis, unspecified organism    Influenza A    Acute pain of left knee    Type 2 diabetes mellitus with hyperglycemia, with long-term current use of insulin    CKD (chronic kidney disease) stage 3, GFR 30-59 ml/min    Acute respiratory failure with hypoxia    74-year-old female with  1-painful tender left knee with decline in function status post joint aspiration followed by I&D and washout in the setting of respiratory tract infection with influenza A and abnormal urinalysis consistent with UTI-this presentation of tender painful knee with worsening function and abnormal joint fluid analysis with crystal for uric acid positive could very well be due to combination of multiple pathologies happening together:             -Acute gouty arthritis with             -Secondary septic arthritis due to hematogenous seeding from the lung or urine versus             -Progression of osteoarthritis.  2-acute influenza A with secondary bacterial pneumonia  3-abnormal urinalysis with urine culture without any specific urinary symptoms  4-acute on chronic renal insufficiency with prior history of left-sided hydronephrosis and history of right nephrectomy  5-osteoarthritis of the right shoulder and wrist without any fracture  6-hypertension  7-peripheral neuropathy  8-history of renal cell carcinoma-history of right nephrectomy  9-type 2 diabetes.     Recommendations/Discussions:  See my discussion and recommendations on 2/13/2025 and 2/14/2025.  Management  of concomitant gout with short course of oral steroid per primary team and orthopedic surgery service.  Patient will need out of hospital follow-up with orthopedic surgery service as per their recommendations  See discussion in the additional progress note on 2/14/2025 for alternate oral antibiotic therapy as a second line option which is better than taking no treatment as she is still very reluctant for IV treatment as recommended:  Patient is very concerned about her ability to get IV antibiotics and was hoping that she would get oral antibiotics for her infection.  I explained the patient about ideal treatment for this situation as recommended in my progress note and orders for the case management.  If patient after being explained the risks and benefits of her decision making after using oral antibiotics over IV antibiotics makes the decision to go with oral antibiotics then she could be switched to oral Keflex 500 mg every 8 hours for 28 days from 2/11/2025.  With ongoing continued management of gout  Patient will need close follow-up with the primary care provider with once weekly lab work to monitor antibiotic toxicity and side effects with instructions to reach out to Dr. Duncan on a once a week basis to go over review of system to monitor antibiotic toxicity and side effects and have a primary care provider reach out to me if there is need for discussion of further management of her left knee infection.  Ideal treatment for this presentation is discussed will be 4 weeks of IV Rocephin from last surgical intervention on 2/11/2025.  Following orders are written for case management:   Please arrange for 4 weeks of IV Rocephin at 2 g 24 hours starting 2/11/2025.  Check weekly CBC CMP and CRP and call abnormal results at [phone].  Remove midline/PICC line after completion of antibiotic therapy  Please educate patient to reach out to Dr. Duncan on once a week basis at [phone] to go over review of system to  "monitor antibiotic toxicity and effectiveness of treatment.  Diagnosis:  1-painful tender left knee with decline in function status post joint aspiration followed by I&D and washout in the setting of respiratory tract infection with influenza A and abnormal urinalysis consistent with UTI-this presentation of tender painful knee with worsening function and abnormal joint fluid analysis with crystal for uric acid positive could very well be due to combination of multiple pathologies happening together:             -Acute gouty arthritis with             -Secondary septic arthritis due to hematogenous seeding from the lung or urine versus             -Progression of osteoarthritis.  2-acute influenza A with secondary bacterial pneumonia  3-abnormal urinalysis with urine culture without any specific urinary symptoms  Roxanne Duncan MD  2025  07:13 EST    Parts of this note may be an electronic transcription/translation of spoken language to printed text using the Dragon dictation system.      Electronically signed by Roxanne Duncan MD at 25 0821       Silvestre Pierre MD at 25 0911           LOS: 9 days     Name: Chrissy Gutierrez  Age: 74 y.o.  Sex: female  :  1950  MRN: [id number]         Primary Care Physician: Jai Steven MD    Subjective   Subjective  No new complaints or acute overnight events    Objective   Vital Signs  Temp:  [96.7 °F (35.9 °C)-98.1 °F (36.7 °C)] 97 °F (36.1 °C)  Heart Rate:  [75-95] 81  Resp:  [16-18] 16  BP: (111-129)/(66-87) 114/66  Body mass index is 31.95 kg/m².    Objective:  General Appearance:  Comfortable and in no acute distress.    Vital signs: (most recent): Blood pressure 114/66, pulse 81, temperature 97 °F (36.1 °C), temperature source Oral, resp. rate 16, height 170.2 cm (67\"), weight 92.5 kg (204 lb), SpO2 94%.    Lungs:  Normal effort and normal respiratory rate.  She is not in respiratory distress.  There are decreased breath sounds.  "   Heart: Normal rate.  Regular rhythm.    Abdomen: Abdomen is soft.  Bowel sounds are normal.   There is no abdominal tenderness.     Extremities: There is no dependent edema or local swelling.    Neurological: Patient is alert and oriented to person, place and time.    Skin:  Warm and dry.                Results Review:       I reviewed the patient's new clinical results.    Results from last 7 days   Lab Units 02/19/25 0442 02/18/25  0631 02/17/25  0908 02/16/25  0417 02/15/25  0531 02/14/25  0427 02/13/25  0416   WBC 10*3/mm3 13.74* 13.47* 12.74* 11.70* 10.16 15.00* 17.05*   HEMOGLOBIN g/dL 11.2* 11.1* 10.9* 10.6* 10.0* 9.7* 9.9*   PLATELETS 10*3/mm3 601* 577* 575* 483* 485* 430 395     Results from last 7 days   Lab Units 02/19/25 0442 02/18/25  0631 02/17/25  0909 02/16/25  0417 02/15/25  0531 02/14/25 0427 02/13/25  0416   SODIUM mmol/L 138 138 138 139 137 137 139   POTASSIUM mmol/L 4.5 4.6 4.2 4.8 4.3 4.3 4.3   CHLORIDE mmol/L 100 100 99 102 102 104 104   CO2 mmol/L 24.9 23.1 24.7 22.9 22.0 19.0* 18.0*   BUN mg/dL 74* 71* 65* 66* 55* 49* 37*   CREATININE mg/dL 1.53* 1.37* 1.31* 1.49* 1.15* 1.43* 1.40*   CALCIUM mg/dL 9.5 9.2 9.1 8.7 8.6 8.8 9.0   GLUCOSE mg/dL 158* 188* 223* 240* 137* 136* 202*     Results from last 7 days   Lab Units 02/15/25  0530   INR  1.16*             Scheduled Meds:   aspirin, 81 mg, Oral, BID  carvedilol, 12.5 mg, Oral, BID With Meals  cefTRIAXone, 2,000 mg, Intravenous, Q24H  empagliflozin, 25 mg, Oral, Daily  furosemide, 20 mg, Oral, Daily  glipizide, 5 mg, Oral, QAM AC  insulin glargine, 10 Units, Subcutaneous, Daily  insulin glargine, 15 Units, Subcutaneous, Nightly  insulin lispro, 3-14 Units, Subcutaneous, 4x Daily AC & at Bedtime  ipratropium-albuterol, 3 mL, Nebulization, Q6H While Awake - RT  predniSONE, 40 mg, Oral, Daily With Breakfast  sodium bicarbonate, 650 mg, Oral, TID  sodium chloride, 10 mL, Intravenous, Q12H      PRN Meds:     senna-docusate sodium **AND**  polyethylene glycol **AND** bisacodyl **AND** bisacodyl    calcium carbonate    dextrose    dextrose    glucagon (human recombinant)    ipratropium-albuterol    methocarbamol    nitroglycerin    ondansetron ODT **OR** ondansetron    sodium chloride    sodium chloride  Continuous Infusions:       Assessment & Plan   Active Hospital Problems    Diagnosis  POA    **Bacterial pneumonia [J15.9]  Yes    Sepsis, unspecified organism [A41.9]  Yes    Influenza A [J10.1]  Yes    Acute pain of left knee [M25.562]  Yes    Type 2 diabetes mellitus with hyperglycemia, with long-term current use of insulin [E11.65, Z79.4]  Not Applicable    CKD (chronic kidney disease) stage 3, GFR 30-59 ml/min [N18.30]  Yes    Acute respiratory failure with hypoxia [J96.01]  Yes      Resolved Hospital Problems   No resolved problems to display.       Assessment & Plan    1.  Acute respiratory failure with hypoxia, bacterial pneumonia, recent history of flu infection, superimposed bacterial pneumonia. Treated with antibiotic including Rocephin and doxycycline.  No further need of antibiotics for pneumonia but she remains on these for possible septic arthritis of the knee as outlined below.     2.  Acute pain in the left knee, S/P OR for debridement and washout.  ID recommends a 4-week course of IV antibiotics.  Patient now agreeable to IV antibiotics.  Having trouble finding a facility that will take her.  She is also on prednisone which I will begin tapering today.  If no facility can be found that we will take her with the IV antibiotics then ID has outlined a plan for oral antibiotics as a last resort once all other options have been exhausted.     3.  Acute on chronic kidney injury, renal function has improved, appears to be close to baseline.  Avoid nephrotoxic medications.  Nephrology on board and following.     4.  Diabetes mellitus.  Blood sugars overall reasonably controlled.  Continue present regimen.     5.  Transaminitis.   Gastroenterology suspected muscle related injury.  LFTs trending down     6.  CODE STATUS is full code.       Await discharge planning      Expected Discharge Date: 2025; Expected Discharge Time:      Silvestre Pierre MD  [facility]  25  09:11 EST      Electronically signed by Silvestre Pierre MD at 25 0913       Roxanne Duncan MD at 25 0829            Infectious Diseases Progress Note    Roxanne Duncan MD     [facility]  [place]: 9 days  Patient Identification:  Name: Chrissy Gutierrez  Age: 74 y.o.  Sex: female  :  1950  MRN: [id number]         Primary Care Physician: Jai Steven MD        Subjective: Continues to improve.  Still waiting for final decision from insurance company and place where she can get IV antibiotics.  Leaning more towards IV antibiotics as she understands the limitations of oral antibiotic therapy.  Interval History: See consultation note.    Objective:    Scheduled Meds:aspirin, 81 mg, Oral, BID  carvedilol, 12.5 mg, Oral, BID With Meals  cefTRIAXone, 2,000 mg, Intravenous, Q24H  empagliflozin, 25 mg, Oral, Daily  furosemide, 20 mg, Oral, Daily  glipizide, 5 mg, Oral, QAM AC  insulin glargine, 10 Units, Subcutaneous, Daily  insulin glargine, 15 Units, Subcutaneous, Nightly  insulin lispro, 3-14 Units, Subcutaneous, 4x Daily AC & at Bedtime  ipratropium-albuterol, 3 mL, Nebulization, Q6H While Awake - RT  predniSONE, 40 mg, Oral, Daily With Breakfast  sodium bicarbonate, 650 mg, Oral, TID  sodium chloride, 10 mL, Intravenous, Q12H      Continuous Infusions:     Vital signs in last 24 hours:  Temp:  [96.7 °F (35.9 °C)-98.1 °F (36.7 °C)] 98.1 °F (36.7 °C)  Heart Rate:  [75-95] 81  Resp:  [16-18] 16  BP: (111-129)/(68-87) 129/87    Intake/Output:    Intake/Output Summary (Last 24 hours) at 2025 0829  Last data filed at 2025 1100  Gross per 24 hour   Intake 360 ml   Output 300 ml   Net 60 ml  "      Exam:  /87 (BP Location: Right arm, Patient Position: Lying)   Pulse 81   Temp 98.1 °F (36.7 °C) (Oral)   Resp 16   Ht 170.2 cm (67\")   Wt 92.5 kg (204 lb)   SpO2 94%   BMI 31.95 kg/m²   Patient is examined using the personal protective equipment as per guidelines from infection control for this particular patient as enacted.  Hand washing was performed before and after patient interaction.  General Appearance:    Alert, cooperative, no distress, AAOx3                          Head:    Normocephalic, without obvious abnormality, atraumatic                           Eyes:    PERRL, conjunctivae/corneas clear, EOM's intact, both eyes                         Throat:   Lips, tongue, gums normal; oral mucosa pink and moist                           Neck:   Supple, symmetrical, trachea midline, no JVD                         Lungs:    Clear to auscultation bilaterally, respirations unlabored                 Chest Wall:    No tenderness or deformity                          Heart:  S1-S2 regular                  Abdomen:   Soft nontender                 Extremities: Left knee and lower extremities wrapped in the Ace wrap.                        Pulses:   Pulses palpable in all extremities                            Skin:   Skin is warm and dry,  no rashes or palpable lesions                  Neurologic: Alert and oriented x 3       Data Review:    I reviewed the patient's new clinical results.  Results from last 7 days   Lab Units 02/19/25 0442 02/18/25 0631 02/17/25 0908 02/16/25  0417 02/15/25  0531 02/14/25  0427 02/13/25  0416   WBC 10*3/mm3 13.74* 13.47* 12.74* 11.70* 10.16 15.00* 17.05*   HEMOGLOBIN g/dL 11.2* 11.1* 10.9* 10.6* 10.0* 9.7* 9.9*   PLATELETS 10*3/mm3 601* 577* 575* 483* 485* 430 395     Results from last 7 days   Lab Units 02/19/25 0442 02/18/25 0631 02/17/25  0909 02/16/25  0417 02/15/25  0531 02/14/25  0427 02/13/25  0416   SODIUM mmol/L 138 138 138 139 137 137 139   POTASSIUM " mmol/L 4.5 4.6 4.2 4.8 4.3 4.3 4.3   CHLORIDE mmol/L 100 100 99 102 102 104 104   CO2 mmol/L 24.9 23.1 24.7 22.9 22.0 19.0* 18.0*   BUN mg/dL 74* 71* 65* 66* 55* 49* 37*   CREATININE mg/dL 1.53* 1.37* 1.31* 1.49* 1.15* 1.43* 1.40*   CALCIUM mg/dL 9.5 9.2 9.1 8.7 8.6 8.8 9.0   GLUCOSE mg/dL 158* 188* 223* 240* 137* 136* 202*     Microbiology Results (last 10 days)       Procedure Component Value - Date/Time    Tissue / Bone Culture - Tissue, Knee, Left [[id number]] Collected: 02/12/25 1341    Lab Status: Final result Specimen: Tissue from Knee, Left Updated: 02/15/25 0726     Tissue Culture No growth at 3 days     Gram Stain No WBCs or organisms seen    Anaerobic Culture - Synovial Fluid, Knee, Left [[id number]]  (Normal) Collected: 02/12/25 1327    Lab Status: Final result Specimen: Synovial Fluid from Knee, Left Updated: 02/17/25 0710     Anaerobic Culture No anaerobes isolated at 5 days    Body Fluid Culture - Synovial Fluid, Knee, Left [[id number]] Collected: 02/12/25 1327    Lab Status: Final result Specimen: Synovial Fluid from Knee, Left Updated: 02/17/25 0742     Body Fluid Culture No growth at 5 days     Gram Stain Many (4+) WBCs seen      No organisms seen    Body Fluid Culture - Synovial Fluid, Knee, Left [[id number]] Collected: 02/11/25 1745    Lab Status: Final result Specimen: Synovial Fluid from Knee, Left Updated: 02/16/25 0620     Body Fluid Culture No growth at 5 days     Gram Stain Moderate (3+) WBCs seen      No organisms seen    Anaerobic Culture - Synovial Fluid, Knee, Left [[id number]]  (Normal) Collected: 02/11/25 1745    Lab Status: Final result Specimen: Synovial Fluid from Knee, Left Updated: 02/16/25 0701     Anaerobic Culture No anaerobes isolated at 5 days    Urine Culture - Urine, Urine, Clean Catch [[id number]]  (Abnormal)  (Susceptibility) Collected: 02/10/25 0500    Lab Status: Final result Specimen: Urine, Clean Catch Updated: 02/12/25 0903     Urine Culture >100,000 CFU/mL Escherichia  coli    Narrative:      Colonization of the urinary tract without infection is common. Treatment is discouraged unless the patient is symptomatic, pregnant, or undergoing an invasive urologic procedure.    Susceptibility        Escherichia coli      TICO      Amoxicillin + Clavulanate Susceptible      Ampicillin Susceptible      Ampicillin + Sulbactam Susceptible      Cefazolin (Urine) Susceptible      Cefepime Susceptible      Ceftazidime Susceptible      Ceftriaxone Susceptible      Gentamicin Susceptible      Levofloxacin Susceptible      Nitrofurantoin Susceptible      Piperacillin + Tazobactam Susceptible      Trimethoprim + Sulfamethoxazole Susceptible                           Blood Culture - Blood, Arm, Left [[id number]]  (Normal) Collected: 02/10/25 0501    Lab Status: Final result Specimen: Blood from Arm, Left Updated: 02/15/25 0515     Blood Culture No growth at 5 days    Blood Culture - Blood, Arm, Left [[id number]]  (Normal) Collected: 02/10/25 0500    Lab Status: Final result Specimen: Blood from Arm, Left Updated: 02/15/25 0515     Blood Culture No growth at 5 days    Respiratory Panel PCR w/COVID-19(SARS-CoV-2) [facility] In-House, NP Swab in UTM/VTM, 2 HR TAT - Swab, Nasopharynx [[id number]]  (Abnormal) Collected: 02/10/25 0401    Lab Status: Final result Specimen: Swab from Nasopharynx Updated: 02/10/25 0824     ADENOVIRUS, PCR Not Detected     Coronavirus 229E Not Detected     Coronavirus HKU1 Not Detected     Coronavirus NL63 Not Detected     Coronavirus OC43 Not Detected     COVID19 Not Detected     Human Metapneumovirus Not Detected     Human Rhinovirus/Enterovirus Not Detected     Influenza A PCR Equivocal     Comment: Appended report. These results have been appended to a previously preliminary verified report.        Influenza B PCR Not Detected     Parainfluenza Virus 1 Not Detected     Parainfluenza Virus 2 Not Detected     Parainfluenza Virus 3 Not Detected     Parainfluenza  Virus 4 Not Detected     RSV, PCR Not Detected     Bordetella pertussis pcr Not Detected     Bordetella parapertussis PCR Not Detected     Chlamydophila pneumoniae PCR Not Detected     Mycoplasma pneumo by PCR Not Detected    Narrative:      In the setting of a positive respiratory panel with a viral infection PLUS a negative procalcitonin without other underlying concern for bacterial infection, consider observing off antibiotics or discontinuation of antibiotics and continue supportive care. If the respiratory panel is positive for atypical bacterial infection (Bordetella pertussis, Chlamydophila pneumoniae, or Mycoplasma pneumoniae), consider antibiotic de-escalation to target atypical bacterial infection.              Assessment:    Bacterial pneumonia    Sepsis, unspecified organism    Influenza A    Acute pain of left knee    Type 2 diabetes mellitus with hyperglycemia, with long-term current use of insulin    CKD (chronic kidney disease) stage 3, GFR 30-59 ml/min    Acute respiratory failure with hypoxia    74-year-old female with  1-painful tender left knee with decline in function status post joint aspiration followed by I&D and washout in the setting of respiratory tract infection with influenza A and abnormal urinalysis consistent with UTI-this presentation of tender painful knee with worsening function and abnormal joint fluid analysis with crystal for uric acid positive could very well be due to combination of multiple pathologies happening together:             -Acute gouty arthritis with             -Secondary septic arthritis due to hematogenous seeding from the lung or urine versus             -Progression of osteoarthritis.  2-acute influenza A with secondary bacterial pneumonia  3-abnormal urinalysis with urine culture without any specific urinary symptoms  4-acute on chronic renal insufficiency with prior history of left-sided hydronephrosis and history of right nephrectomy  5-osteoarthritis of  the right shoulder and wrist without any fracture  6-hypertension  7-peripheral neuropathy  8-history of renal cell carcinoma-history of right nephrectomy  9-type 2 diabetes.     Recommendations/Discussions:  See my discussion and recommendations on 2/13/2025 and 2/14/2025.  Management of concomitant gout with short course of oral steroid per primary team and orthopedic surgery service.  Patient will need out of hospital follow-up with orthopedic surgery service as per their recommendations  See discussion in the additional progress note on 2/14/2025 for alternate oral antibiotic therapy as a second line option which is better than taking no treatment as she is still very reluctant for IV treatment as recommended:  Patient is very concerned about her ability to get IV antibiotics and was hoping that she would get oral antibiotics for her infection.  I explained the patient about ideal treatment for this situation as recommended in my progress note and orders for the case management.  If patient after being explained the risks and benefits of her decision making after using oral antibiotics over IV antibiotics makes the decision to go with oral antibiotics then she could be switched to oral Keflex 500 mg every 8 hours for 28 days from 2/11/2025.  With ongoing continued management of gout  Patient will need close follow-up with the primary care provider with once weekly lab work to monitor antibiotic toxicity and side effects with instructions to reach out to Dr. Duncan on a once a week basis to go over review of system to monitor antibiotic toxicity and side effects and have a primary care provider reach out to me if there is need for discussion of further management of her left knee infection.  Ideal treatment for this presentation is discussed will be 4 weeks of IV Rocephin from last surgical intervention on 2/11/2025.  Following orders are written for case management:   Please arrange for 4 weeks of IV Rocephin at  2 g 24 hours starting 2025.  Check weekly CBC CMP and CRP and call abnormal results at [phone].  Remove midline/PICC line after completion of antibiotic therapy  Please educate patient to reach out to Dr. Duncan on once a week basis at [phone] to go over review of system to monitor antibiotic toxicity and effectiveness of treatment.  Diagnosis:  1-painful tender left knee with decline in function status post joint aspiration followed by I&D and washout in the setting of respiratory tract infection with influenza A and abnormal urinalysis consistent with UTI-this presentation of tender painful knee with worsening function and abnormal joint fluid analysis with crystal for uric acid positive could very well be due to combination of multiple pathologies happening together:             -Acute gouty arthritis with             -Secondary septic arthritis due to hematogenous seeding from the lung or urine versus             -Progression of osteoarthritis.  2-acute influenza A with secondary bacterial pneumonia  3-abnormal urinalysis with urine culture without any specific urinary symptoms  Roxanne Duncan MD  2025  08:29 EST    Parts of this note may be an electronic transcription/translation of spoken language to printed text using the Dragon dictation system.      Electronically signed by Roxanne Duncan MD at 25 1224       Antionette Patel MD at 25 0802           [facility]     Progress Note    Patient Name: Chrissy Gutierrez  : 1950  MRN: [id number]  Primary Care Physician:  Jai Steven MD  Date of admission: 2/10/2025    Subjective   Subjective     Chief Complaint: oral on ckd     History of Present Illness  Patient with oral on ckd III admitted with flu and knee infection     Review of Systems  She is feeling ok this am  No acute events.     Objective   Objective     Vitals:   Temp:  [96.7 °F (35.9 °C)-98.1 °F (36.7 °C)] 98.1 °F (36.7 °C)  Heart Rate:  [75-95] 81  Resp:   [16-18] 16  BP: (111-129)/(68-87) 129/87  Flow (L/min) (Oxygen Therapy):  [3] 3    Physical Exam   General Appearance:  In no acute distress.    HEENT: Normal HEENT exam.     Extremities: .  There is no deformity, effusion or dependent edema.    Neurological: Patient is alert     Result Review    Result Review:  I have personally reviewed the results from the time of this admission to 2/19/2025 08:02 EST and agree with these findings:  [x]  Laboratory list / accordion  [x]  Microbiology  []  Radiology  []  EKG/Telemetry   []  Cardiology/Vascular   []  Pathology  []  Old records  []  Other:  Most notable findings include:       Assessment & Plan   Assessment / Plan     Brief Patient Summary:  Chrissy Gutierrez is a 74 y.o. female who has influenza and ckd III    Active Hospital Problems:  Active Hospital Problems    Diagnosis     **Bacterial pneumonia     Sepsis, unspecified organism     Influenza A     Acute pain of left knee     Type 2 diabetes mellitus with hyperglycemia, with long-term current use of insulin     CKD (chronic kidney disease) stage 3, GFR 30-59 ml/min     Acute respiratory failure with hypoxia      Plan:   CKD stage IIIa, (1.7-1.8)    Metabolic acidosis    Bacterial pneumonia, on IV/p.o. antibiotics    Sepsis, unspecified organism    Influenza A  Chronic diastolic CHF, back on oral diuretics today    Acute pain of left knee- septic arthritis s/p left knee irrigation and debridement.     Type 2 diabetes mellitus with hyperglycemia, with long-term current use of insulin    CKD (chronic kidney disease) stage 3, GFR 30-59 ml/min    Acute respiratory failure with hypoxia  Hyperphosphatemia   Azotemia- worse. No uremia    Renal functions table within her baseline.  Azotemia stable, likely due to steroids   Metabolic acidosis stable. Continue PO bicarb at current dose.   Continue with current diuretic regimen   Phos a bit elevated today. Check today's   Noted plans for 4 weeks of IV abx for septic knee.    Will follow       Antionette Patel MD   [facility].                Electronically signed by Antionette Patel MD at 02/19/25 121       Arvind Ibarra MD at 02/18/25 2121          Orthopaedic Progress Note     Knee pain improving. Dressing clean and dry. To change tomorrow. Pending placement. Asa lvx held. To restart asa 81 mg 2/19/25    NAD  Alert  Respirations are nonlabored   LLE  Dressing clean and dry  Able to DF and PF at the ankle weakly   Knee ROM 0 -45   Sensation is intact distally   Foot is wwp   Compartments are soft and compressible     Ranging all other joints as tolerated with no pain right knee stiff with ROM 0-90     74 year old female with suspected left knee septic arthritis in the setting of bacterial pneumonia and UTI   POD 6 left knee irrigation and debridement   -discussed with her again that her cultures have remained negative and ID recommends 4 weeks course of IV Rocephin.   -WBAT BLE   -encourage ROM and mobilization  -wound dressing clean and dry to change tomorrow   -to chair if able  -pain control   -ice and elevation   -PT    -restart dvt ppx 81 mg ASA BID 2/19/25 remain on until fully mobilizing    -please call/chat  with any questions or concerns.    Arvind Ibarra MD   [facility]   [phone] - [place] [facility]  [phone] - [place] [facility]      Electronically signed by Arvind Ibarra MD at 02/18/25 6433

## 2025-02-21 NOTE — CASE MANAGEMENT/SOCIAL WORK
Continued Stay Note  [facility]     Patient Name: Chrissy Gutierrez  MRN: [id number]  Today's Date: 2/21/2025    Admit Date: 2/10/2025    Plan: [facility]- accepted and pre-cert initiated   Discharge Plan       Row Name 02/21/25 1558       Plan    Plan [facility]- accepted and pre-cert initiated    Patient/Family in Agreement with Plan yes    Plan Comments Multiple facilities have accepted. Spoke with patient and spouse and they would like to go to [facility]. E-mail sent to initiate pre-cert. Packet in [employer] and pharmacy updated. Bed available over the weekend if pre-cert obtained.                   Discharge Codes    No documentation.                 Expected Discharge Date and Time       Expected Discharge Date Expected Discharge Time    Feb 22, 2025               Elida Hayes RN

## 2025-02-21 NOTE — PLAN OF CARE
Goal Outcome Evaluation:  Plan of Care Reviewed With: (P) patient        Progress: (P) improving  Outcome Evaluation: (P) Upon entering room, pt was supine in bed and agreeable to do PT this AM. Pt reports having 4/10 pain at the beginning of session. Performed L TKA exercise protocol x 10 reps. Pt was SBA with supine <-> sit using bed rail for assistance. Performed STS with CGA and Rwx with bed elevated. Pt ambulated 20 ft using Rwx with CGA to the bathroom and back to chair. Will continue to follow.    Anticipated Discharge Disposition (PT): (P) skilled nursing facility

## 2025-02-21 NOTE — PROGRESS NOTES
"RENAL/KCC:     LOS: 11 days     Chief Complaint/ Reason for encounter: CKD management    Subjective   Chart reviewed  No new complaints    Vital Signs  Temp:  [96.8 °F (36 °C)-98 °F (36.7 °C)] 96.9 °F (36.1 °C)  Heart Rate:  [69-95] 69  Resp:  [16-18] 18  BP: (108-133)/(64-81) 121/81       Wt Readings from Last 1 Encounters:   02/10/25 0330 92.5 kg (204 lb)       Objective:  Vital signs: (most recent): Blood pressure 121/81, pulse 69, temperature 96.9 °F (36.1 °C), temperature source Oral, resp. rate 18, height 170.2 cm (67\"), weight 92.5 kg (204 lb), SpO2 94%.            Objective:  General Appearance:  Comfortable, obese, well-appearing, in no acute distress and not in pain.  HEENT: Mucous membranes moist  Lungs:  Normal effort and normal respiratory rate.  Breath sounds clear to auscultation: No rhonchi/Rales.  No  respiratory distress.   Heart:  S1, S2 normal.  No murmur.   Abdomen: Abdomen is soft, nontender/nondistended, + bowel sounds  Extremities: Trace edema of bilateral lower extremities  Skin:  Warm and dry with no rashes      Results Review:    Intake/Output:     Intake/Output Summary (Last 24 hours) at 2/21/2025 1016  Last data filed at 2/21/2025 0600  Gross per 24 hour   Intake 660 ml   Output --   Net 660 ml         DATA:  Radiology and Labs:  The following labs independently reviewed by me. Additional labs ordered for tomorrow a.m.  Interval notes, chart personally reviewed by me.   Old records independently reviewed showing CKD 3  Discussed with patient    Risk/ complexity of medical care/ medical decision making moderate, diuretic management with CKD      Labs:   Recent Results (from the past 24 hours)   POC Glucose Once    Collection Time: 02/20/25 11:25 AM    Specimen: Blood   Result Value Ref Range    Glucose 243 (H) 70 - 130 mg/dL   POC Glucose Once    Collection Time: 02/20/25  4:34 PM    Specimen: Blood   Result Value Ref Range    Glucose 372 (H) 70 - 130 mg/dL   POC Glucose Once    " Collection Time: 02/20/25  8:37 PM    Specimen: Blood   Result Value Ref Range    Glucose 353 (H) 70 - 130 mg/dL   POC Glucose Once    Collection Time: 02/21/25  7:19 AM    Specimen: Blood   Result Value Ref Range    Glucose 103 70 - 130 mg/dL       Radiology:  Pertinent radiology studies were reviewed as described above      Medications have been reviewed separately in chart review medication tab      ASSESSMENT:  CKD stage IIIa, stable, near baseline  Bacterial pneumonia, on IV/p.o. antibiotics  Sepsis, unspecified organism, improving with antibiotic therapy  Influenza A  Chronic diastolic CHF, on oral diuretics   Type 2 diabetes mellitus with hyperglycemia, with long-term current use of insulin  Acute respiratory failure with hypoxia, improved, on room air           DISCUSSION/PLAN:   Creatinine level has been stable near baseline creatinine of around 1.4-1.8 but no labs the past couple days  Check BMP now  Clinically euvolemic on exam on oral Lasix dose: Continue  Antibiotics per infectious disease, dosed for GFR around 40  She would need a few more weeks of IV antibiotics.  I think her chance of progressing to ESRD is pretty low so okay to place midline immediately prior to discharge with prompt removal once IV antibiotics are complete  Her potassium has been stable off Lokelma  Continue Jardiance  Discharge planning      Justice Grayson MD  [facility]   Office phone number: [phone]  Answering service phone number: [phone]  02/21/25  10:16 EST

## 2025-02-21 NOTE — PROGRESS NOTES
"  Infectious Diseases Progress Note    Roxanne Duncan MD     [facility]  Los: 11 days  Patient Identification:  Name: Chrissy Gutierrez  Age: 74 y.o.  Sex: female  :  1950  MRN: [id number]         Primary Care Physician: Jai Steven MD        Subjective: Feeling better, mobility is improving.  Awaiting decision about her rehab disposition.  Interval History: See consultation note.    Objective:    Scheduled Meds:aspirin, 81 mg, Oral, BID  carvedilol, 12.5 mg, Oral, BID With Meals  cefTRIAXone, 2,000 mg, Intravenous, Q24H  empagliflozin, 25 mg, Oral, Daily  furosemide, 20 mg, Oral, Daily  glipizide, 5 mg, Oral, QAM AC  insulin glargine, 10 Units, Subcutaneous, Nightly  insulin lispro, 3-14 Units, Subcutaneous, 4x Daily AC & at Bedtime  ipratropium-albuterol, 3 mL, Nebulization, Q6H While Awake - RT  [START ON 2025] predniSONE, 20 mg, Oral, Daily With Breakfast  sodium chloride, 10 mL, Intravenous, Q12H      Continuous Infusions:     Vital signs in last 24 hours:  Temp:  [96.8 °F (36 °C)-98 °F (36.7 °C)] 96.9 °F (36.1 °C)  Heart Rate:  [69-95] 69  Resp:  [16-18] 18  BP: (108-133)/(64-81) 121/81    Intake/Output:    Intake/Output Summary (Last 24 hours) at 2025 0928  Last data filed at 2025 0600  Gross per 24 hour   Intake 1252 ml   Output --   Net 1252 ml       Exam:  /81 (BP Location: Left arm, Patient Position: Lying)   Pulse 69   Temp 96.9 °F (36.1 °C) (Oral)   Resp 18   Ht 170.2 cm (67\")   Wt 92.5 kg (204 lb)   SpO2 94%   BMI 31.95 kg/m²   Patient is examined using the personal protective equipment as per guidelines from infection control for this particular patient as enacted.  Hand washing was performed before and after patient interaction.  General Appearance:    Alert, cooperative, no distress, AAOx3                          Head:    Normocephalic, without obvious abnormality, atraumatic                           Eyes:    PERRL, conjunctivae/corneas " clear, EOM's intact, both eyes                         Throat:   Lips, tongue, gums normal; oral mucosa pink and moist                           Neck:   Supple, symmetrical, trachea midline, no JVD                         Lungs:    Clear to auscultation bilaterally, respirations unlabored                 Chest Wall:    No tenderness or deformity                          Heart:  S1-S2 regular                  Abdomen:   Soft nontender                 Extremities: Left knee and lower extremities wrapped in the Ace wrap.                        Pulses:   Pulses palpable in all extremities                            Skin:   Skin is warm and dry,  no rashes or palpable lesions                  Neurologic: Alert and oriented x 3       Data Review:    I reviewed the patient's new clinical results.  Results from last 7 days   Lab Units 02/19/25  0442 02/18/25  0631 02/17/25  0908 02/16/25  0417 02/15/25  0531   WBC 10*3/mm3 13.74* 13.47* 12.74* 11.70* 10.16   HEMOGLOBIN g/dL 11.2* 11.1* 10.9* 10.6* 10.0*   PLATELETS 10*3/mm3 601* 577* 575* 483* 485*     Results from last 7 days   Lab Units 02/19/25  0442 02/18/25  0631 02/17/25  0909 02/16/25  0417 02/15/25  0531   SODIUM mmol/L 138 138 138 139 137   POTASSIUM mmol/L 4.5 4.6 4.2 4.8 4.3   CHLORIDE mmol/L 100 100 99 102 102   CO2 mmol/L 24.9 23.1 24.7 22.9 22.0   BUN mg/dL 74* 71* 65* 66* 55*   CREATININE mg/dL 1.53* 1.37* 1.31* 1.49* 1.15*   CALCIUM mg/dL 9.5 9.2 9.1 8.7 8.6   GLUCOSE mg/dL 158* 188* 223* 240* 137*     Microbiology Results (last 10 days)       Procedure Component Value - Date/Time    Tissue / Bone Culture - Tissue, Knee, Left [[id number]] Collected: 02/12/25 1341    Lab Status: Final result Specimen: Tissue from Knee, Left Updated: 02/15/25 0726     Tissue Culture No growth at 3 days     Gram Stain No WBCs or organisms seen    Anaerobic Culture - Synovial Fluid, Knee, Left [[id number]]  (Normal) Collected: 02/12/25 1327    Lab Status: Final result Specimen:  Synovial Fluid from Knee, Left Updated: 02/17/25 0710     Anaerobic Culture No anaerobes isolated at 5 days    Body Fluid Culture - Synovial Fluid, Knee, Left [[id number]] Collected: 02/12/25 1327    Lab Status: Final result Specimen: Synovial Fluid from Knee, Left Updated: 02/17/25 0742     Body Fluid Culture No growth at 5 days     Gram Stain Many (4+) WBCs seen      No organisms seen    Body Fluid Culture - Synovial Fluid, Knee, Left [[id number]] Collected: 02/11/25 1745    Lab Status: Final result Specimen: Synovial Fluid from Knee, Left Updated: 02/16/25 0620     Body Fluid Culture No growth at 5 days     Gram Stain Moderate (3+) WBCs seen      No organisms seen    Anaerobic Culture - Synovial Fluid, Knee, Left [[id number]]  (Normal) Collected: 02/11/25 1745    Lab Status: Final result Specimen: Synovial Fluid from Knee, Left Updated: 02/16/25 0701     Anaerobic Culture No anaerobes isolated at 5 days              Assessment:    Bacterial pneumonia    Sepsis, unspecified organism    Influenza A    Acute pain of left knee    Type 2 diabetes mellitus with hyperglycemia, with long-term current use of insulin    CKD (chronic kidney disease) stage 3, GFR 30-59 ml/min    Acute respiratory failure with hypoxia    74-year-old female with  1-painful tender left knee with decline in function status post joint aspiration followed by I&D and washout in the setting of respiratory tract infection with influenza A and abnormal urinalysis consistent with UTI-this presentation of tender painful knee with worsening function and abnormal joint fluid analysis with crystal for uric acid positive could very well be due to combination of multiple pathologies happening together:             -Acute gouty arthritis with             -Secondary septic arthritis due to hematogenous seeding from the lung or urine versus             -Progression of osteoarthritis.  2-acute influenza A with secondary bacterial pneumonia  3-abnormal urinalysis  with urine culture without any specific urinary symptoms  4-acute on chronic renal insufficiency with prior history of left-sided hydronephrosis and history of right nephrectomy  5-osteoarthritis of the right shoulder and wrist without any fracture  6-hypertension  7-peripheral neuropathy  8-history of renal cell carcinoma-history of right nephrectomy  9-type 2 diabetes.     Recommendations/Discussions:  See my discussion and recommendations on 2/13/2025 and 2/14/2025.  Management of concomitant gout with short course of oral steroid per primary team and orthopedic surgery service.  Patient will need out of hospital follow-up with orthopedic surgery service as per their recommendations  See discussion in the additional progress note on 2/14/2025 for alternate oral antibiotic therapy as a second line option which is better than taking no treatment as she is still very reluctant for IV treatment as recommended:  Patient is very concerned about her ability to get IV antibiotics and was hoping that she would get oral antibiotics for her infection.  I explained the patient about ideal treatment for this situation as recommended in my progress note and orders for the case management.  If patient after being explained the risks and benefits of her decision making after using oral antibiotics over IV antibiotics makes the decision to go with oral antibiotics then she could be switched to oral Keflex 500 mg every 8 hours for 28 days from 2/11/2025.  With ongoing continued management of gout  Patient will need close follow-up with the primary care provider with once weekly lab work to monitor antibiotic toxicity and side effects with instructions to reach out to Dr. Duncan on a once a week basis to go over review of system to monitor antibiotic toxicity and side effects and have a primary care provider reach out to me if there is need for discussion of further management of her left knee infection.  Ideal treatment for this  presentation is discussed will be 4 weeks of IV Rocephin from last surgical intervention on 2/11/2025.  Following orders are written for case management:   Please arrange for 4 weeks of IV Rocephin at 2 g 24 hours starting 2/11/2025.  Check weekly CBC CMP and CRP and call abnormal results at [phone].  Remove midline/PICC line after completion of antibiotic therapy  Please educate patient to reach out to Dr. Duncan on once a week basis at [phone] to go over review of system to monitor antibiotic toxicity and effectiveness of treatment.  Diagnosis:  1-painful tender left knee with decline in function status post joint aspiration followed by I&D and washout in the setting of respiratory tract infection with influenza A and abnormal urinalysis consistent with UTI-this presentation of tender painful knee with worsening function and abnormal joint fluid analysis with crystal for uric acid positive could very well be due to combination of multiple pathologies happening together:             -Acute gouty arthritis with             -Secondary septic arthritis due to hematogenous seeding from the lung or urine versus             -Progression of osteoarthritis.  2-acute influenza A with secondary bacterial pneumonia  3-abnormal urinalysis with urine culture without any specific urinary symptoms  Roxanne Duncan MD  2/21/2025  09:28 EST    Parts of this note may be an electronic transcription/translation of spoken language to printed text using the Dragon dictation system.

## 2025-02-21 NOTE — THERAPY TREATMENT NOTE
Patient Name: Chrissy Gutierrez  : 1950    MRN: [id number]                              Today's Date: 2025       Admit Date: 2/10/2025    Visit Dx:     ICD-10-CM ICD-9-CM   1. Acute respiratory failure with hypoxia  J96.01 518.81   2. Pneumonia of right lower lobe due to infectious organism  J18.9 486   3. Acute pain of both knees  M25.561 338.19    M25.562 719.46   4. Generalized weakness  R53.1 780.79   5. SONI (acute kidney injury)  N17.9 584.9   6. Acute UTI  N39.0 599.0   7. Septic arthritis of knee, left  M00.9 711.06   8. Follow-up exam  Z09 V67.9     Patient Active Problem List   Diagnosis    Right lower lobe pneumonia    Sepsis, unspecified organism    Influenza A    Acute pain of left knee    Bacterial pneumonia    Type 2 diabetes mellitus with hyperglycemia, with long-term current use of insulin    CKD (chronic kidney disease) stage 3, GFR 30-59 ml/min    Acute respiratory failure with hypoxia     Past Medical History:   Diagnosis Date    Cancer     right kidney    Chronic kidney disease     Hypertension     Low back pain     Osteoarthritis     Peripheral neuropathy      Past Surgical History:   Procedure Laterality Date    BACK SURGERY      EPIDURAL BLOCK      KIDNEY SURGERY Right 2017    REMOVED RIGHT KIDNEY    KNEE INCISION AND DRAINAGE Left 2025    Procedure: KNEE INCISION AND DRAINAGE;  Surgeon: Arvind Ibarra MD;  Location: [facility];  Service: Orthopedics;  Laterality: Left;    LUMBAR DISCECTOMY FUSION INSTRUMENTATION N/A 2017    Procedure: 1 LEVEL LAMINECTOMY DECOMPRESSION L4 5 EXCISION FACET CYST;  Surgeon: Negrito Oconnell DO;  Location: [facility];  Service:     TUBAL ABDOMINAL LIGATION      WISDOM TOOTH EXTRACTION        General Information       Row Name 25 0958          Physical Therapy Time and Intention    Document Type therapy note (daily note) (P)   -ZT     Mode of Treatment individual therapy;physical therapy (P)   -ZT       Row Name 25  0958          General Information    Patient Profile Reviewed yes (P)   -ZT     Existing Precautions/Restrictions fall (P)   exit alarm  -ZT       Row Name 02/21/25 0958          Cognition    Orientation Status (Cognition) oriented x 3 (P)   -ZT       Row Name 02/21/25 0958          Safety Issues/Impairments Affecting Functional Mobility    Comment, Safety Issues/Impairments (Mobility) Gait belt used for safety (P)   -ZT               User Key  (r) = Recorded By, (t) = Taken By, (c) = Cosigned By      Initials Name Provider Type    Roderick Stockton PT Student PT Student                   Mobility       Row Name 02/21/25 0959          Bed Mobility    Bed Mobility supine-sit (P)   -ZT     Supine-Sit [place] (Bed Mobility) standby assist (P)   -ZT     Assistive Device (Bed Mobility) bed rails;head of bed elevated (P)   -ZT       Row Name 02/21/25 0959          Sit-Stand Transfer    Sit-Stand [place] (Transfers) contact guard (P)   -ZT     Assistive Device (Sit-Stand Transfers) walker, front-wheeled (P)   -ZT     Comment, (Sit-Stand Transfer) STS x 2 reps. Bed elevated to assist. (P)   -ZT       Row Name 02/21/25 0959          Gait/Stairs (Locomotion)    [place] Level (Gait) contact guard (P)   -ZT     Assistive Device (Gait) walker, front-wheeled (P)   -ZT     Patient was able to Ambulate yes (P)   -ZT     Distance in Feet (Gait) 20 (P)   -ZT     Comment, (Gait/Stairs) Pt able to ambulate from bed to bathroom and back to chair. Reported being fatigued. (P)   -ZT       Row Name 02/21/25 0959          Mobility    Extremity Weight-bearing Status left lower extremity (P)   -ZT     Left Lower Extremity (Weight-bearing Status) weight-bearing as tolerated (WBAT) (P)   -ZT               User Key  (r) = Recorded By, (t) = Taken By, (c) = Cosigned By      Initials Name Provider Type    Roderick Stockton PT Student PT Student                   Obj/Interventions       Row Name 02/21/25 1003          Motor  Skills    Therapeutic Exercise knee (P)   L TKA exercise protocol x 10 reps  -ZT               User Key  (r) = Recorded By, (t) = Taken By, (c) = Cosigned By      Initials Name Provider Type    Roderick Stockton, PT Student PT Student                   Goals/Plan    No documentation.                  Clinical Impression       Row Name 02/21/25 1003          Pain    Pretreatment Pain Rating 4/10 (P)   -ZT     Posttreatment Pain Rating 4/10 (P)   -ZT     Pain Location knee (P)   -ZT     Pain Side/Orientation left (P)   -ZT     Pain Management Interventions nursing notified;premedicated for activity;positioning techniques utilized (P)   -ZT       Row Name 02/21/25 1003          Plan of Care Review    Plan of Care Reviewed With patient (P)   -ZT     Progress improving (P)   -ZT     Outcome Evaluation Upon entering room, pt was supine in bed and agreeable to do PT this AM. Pt reports having 4/10 pain at the beginning of session. Performed L TKA exercise protocol x 10 reps. Pt was SBA with supine <-> sit using bed rail for assistance. Performed STS with CGA and Rwx with bed elevated. Pt ambulated 20 ft using Rwx with CGA to the bathroom and back to chair. Will continue to follow. (P)   -ZT       Row Name 02/21/25 1003          Positioning and Restraints    Pre-Treatment Position in bed (P)   -ZT     Post Treatment Position chair (P)   -ZT     In Chair notified nsg;reclined;call light within reach;encouraged to call for assist;exit alarm on (P)   -ZT               User Key  (r) = Recorded By, (t) = Taken By, (c) = Cosigned By      Initials Name Provider Type    Roderick Stockton PT Student PT Student                   Outcome Measures       Row Name 02/21/25 1015          How much help from another person do you currently need...    Turning from your back to your side while in flat bed without using bedrails? 3 (P)   -ZT     Moving from lying on back to sitting on the side of a flat bed without bedrails? 3 (P)   -ZT      Moving to and from a bed to a chair (including a wheelchair)? 3 (P)   -ZT     Standing up from a chair using your arms (e.g., wheelchair, bedside chair)? 3 (P)   -ZT     Climbing 3-5 steps with a railing? 2 (P)   -ZT     To walk in hospital room? 3 (P)   -ZT     AM-PAC 6 Clicks Score (PT) 17 (P)   -ZT     Highest Level of Mobility Goal 5 --> Static standing (P)   -ZT       Row Name 02/21/25 1015          Functional Assessment    Outcome Measure Options AM-PAC 6 Clicks Basic Mobility (PT) (P)   -ZT               User Key  (r) = Recorded By, (t) = Taken By, (c) = Cosigned By      Initials Name Provider Type    ZT Roderick Casey, PT Student PT Student                                 Physical Therapy Education       Title: PT OT SLP Therapies (Done)       Topic: Physical Therapy (Done)       Point: Mobility training (Done)       Learning Progress Summary            Patient Acceptance, E, DU by  at 2/21/2025 1015    Acceptance, E, DU by  at 2/20/2025 1419    Acceptance, E,D, VU,NR by MS at 2/15/2025 1635    Acceptance, E, NR by  at 2/11/2025 1323                      Point: Home exercise program (Done)       Learning Progress Summary            Patient Acceptance, E, DU by ZT at 2/21/2025 1015    Acceptance, E, DU by  at 2/20/2025 1419    Acceptance, E, NR by  at 2/11/2025 1323                                      User Key       Initials Effective Dates Name Provider Type Discipline    MS 06/16/21 -  Jed Mark, PT Physical Therapist PT     09/06/24 -  Shen Gutierrez PT Physical Therapist PT     01/29/25 -  Roderick Casey, PT Student PT Student PT                  PT Recommendation and Plan     Progress: (P) improving  Outcome Evaluation: (P) Upon entering room, pt was supine in bed and agreeable to do PT this AM. Pt reports having 4/10 pain at the beginning of session. Performed L TKA exercise protocol x 10 reps. Pt was SBA with supine <-> sit using bed rail for assistance. Performed STS with CGA  and Rwx with bed elevated. Pt ambulated 20 ft using Rwx with CGA to the bathroom and back to chair. Will continue to follow.     Time Calculation:         PT Charges       Row Name 02/21/25 1016             Time Calculation    Start Time 0925 (P)   -ZT      Stop Time 0940 (P)   -ZT      Time Calculation (min) 15 min (P)   -ZT      PT Received On 02/21/25 (P)   -ZT      PT - Next Appointment 02/22/25 (P)   -ZT      PT Goal Re-Cert Due Date 02/25/25 (P)   -ZT         Time Calculation- PT    Total Timed Code Minutes- PT 13 minute(s) (P)   -ZT                User Key  (r) = Recorded By, (t) = Taken By, (c) = Cosigned By      Initials Name Provider Type    Roderick Stockton, PT Student PT Student                      PT G-Codes  Outcome Measure Options: (P) AM-PAC 6 Clicks Basic Mobility (PT)  AM-PAC 6 Clicks Score (PT): (P) 17  PT Discharge Summary  Anticipated Discharge Disposition (PT): (P) skilled nursing facility    Roderick Casey, PT Student  2/21/2025

## 2025-02-21 NOTE — CASE MANAGEMENT/SOCIAL WORK
Post-Acute Authorization Submission      Post Acute Pre-Cert Documentation  Request Submitted by Facility - Type:: Hospital  Post-Acute Authorization Type Submitted:: SNF  Date Post Acute Pre-Cert Inititated per Facility: 02/21/25  Accepting Facility: [facility] Discharge Date Requested: 02/22/25  All Clinicals Submitted?: Yes  Had Accepting Facility at Time of Submission: Yes  Response Communicated to:: , Accepting Facility Liaison  Authorization Number:: PENDING [id number]              GARDENIA Mitchell

## 2025-02-21 NOTE — PROGRESS NOTES
" LOS: 11 days     Name: Chrissy Gutierrez  Age: 74 y.o.  Sex: female  :  1950  MRN: [id number]         Primary Care Physician: Jai Steven MD    Subjective   Subjective  No new complaints or acute overnight events    Objective   Vital Signs  Temp:  [96.8 °F (36 °C)-98 °F (36.7 °C)] 96.9 °F (36.1 °C)  Heart Rate:  [69-95] 69  Resp:  [16-18] 18  BP: (108-133)/(64-81) 121/81  Body mass index is 31.95 kg/m².    Objective:  General Appearance:  Comfortable and in no acute distress.    Vital signs: (most recent): Blood pressure 121/81, pulse 69, temperature 96.9 °F (36.1 °C), temperature source Oral, resp. rate 18, height 170.2 cm (67\"), weight 92.5 kg (204 lb), SpO2 94%.    Lungs:  Normal effort and normal respiratory rate.    Heart: Normal rate.  Regular rhythm.    Abdomen: Abdomen is soft.  Bowel sounds are normal.   There is no abdominal tenderness.     Extremities: There is no dependent edema or local swelling.    Neurological: Patient is alert and oriented to person, place and time.    Skin:  Warm and dry.                Results Review:       I reviewed the patient's new clinical results.    Results from last 7 days   Lab Units 25  0908 02/16/25  0417 02/15/25  0531   WBC 10*3/mm3 13.74* 13.47* 12.74* 11.70* 10.16   HEMOGLOBIN g/dL 11.2* 11.1* 10.9* 10.6* 10.0*   PLATELETS 10*3/mm3 601* 577* 575* 483* 485*     Results from last 7 days   Lab Units 25  0909 02/16/25  0417 02/15/25  0531   SODIUM mmol/L 138 138 138 139 137   POTASSIUM mmol/L 4.5 4.6 4.2 4.8 4.3   CHLORIDE mmol/L 100 100 99 102 102   CO2 mmol/L 24.9 23.1 24.7 22.9 22.0   BUN mg/dL 74* 71* 65* 66* 55*   CREATININE mg/dL 1.53* 1.37* 1.31* 1.49* 1.15*   CALCIUM mg/dL 9.5 9.2 9.1 8.7 8.6   GLUCOSE mg/dL 158* 188* 223* 240* 137*     Results from last 7 days   Lab Units 02/15/25  0530   INR  1.16*             Scheduled Meds:   aspirin, 81 mg, Oral, BID  carvedilol, 12.5 " mg, Oral, BID With Meals  cefTRIAXone, 2,000 mg, Intravenous, Q24H  empagliflozin, 25 mg, Oral, Daily  furosemide, 20 mg, Oral, Daily  glipizide, 5 mg, Oral, QAM AC  insulin glargine, 10 Units, Subcutaneous, Daily  insulin glargine, 10 Units, Subcutaneous, Nightly  insulin lispro, 3-14 Units, Subcutaneous, 4x Daily AC & at Bedtime  ipratropium-albuterol, 3 mL, Nebulization, Q6H While Awake - RT  predniSONE, 30 mg, Oral, Daily With Breakfast  sodium chloride, 10 mL, Intravenous, Q12H      PRN Meds:     senna-docusate sodium **AND** polyethylene glycol **AND** bisacodyl **AND** bisacodyl    calcium carbonate    dextrose    dextrose    glucagon (human recombinant)    ipratropium-albuterol    methocarbamol    nitroglycerin    ondansetron ODT **OR** ondansetron    oxyCODONE    sodium chloride    sodium chloride    sodium chloride  Continuous Infusions:       Assessment & Plan   Active Hospital Problems    Diagnosis  POA    **Bacterial pneumonia [J15.9]  Yes    Sepsis, unspecified organism [A41.9]  Yes    Influenza A [J10.1]  Yes    Acute pain of left knee [M25.562]  Yes    Type 2 diabetes mellitus with hyperglycemia, with long-term current use of insulin [E11.65, Z79.4]  Not Applicable    CKD (chronic kidney disease) stage 3, GFR 30-59 ml/min [N18.30]  Yes    Acute respiratory failure with hypoxia [J96.01]  Yes      Resolved Hospital Problems   No resolved problems to display.       Assessment & Plan    1.  Acute respiratory failure with hypoxia, bacterial pneumonia, recent history of flu infection, superimposed bacterial pneumonia. Treated with antibiotic including Rocephin and doxycycline.  No further need of antibiotics for pneumonia but she remains on these for possible septic arthritis of the knee as outlined below.     2.  Acute pain in the left knee, S/P OR for debridement and washout.  ID recommends a 4-week course of IV antibiotics.  Patient now agreeable to IV antibiotics.  Having trouble finding a facility  that will take her.  Continue prednisone taper.  If no facility can be found that we will take her with the IV antibiotics then ID has outlined a plan for oral antibiotics as a last resort once all other options have been exhausted.     3.  Acute on chronic kidney injury, renal function has improved, appears to be close to baseline.  Avoid nephrotoxic medications.  Nephrology on board and following.     4.  Diabetes mellitus.  Blood sugars much better controlled this morning.  Decrease Lantus further as we continue to taper steroids.     5.  Transaminitis.  Gastroenterology suspected muscle related injury.  LFTs trending down     6.  CODE STATUS is full code.       Await discharge planning      Expected Discharge Date: 2/21/2025; Expected Discharge Time:      Silvestre Pierre MD  [facility]  02/21/25  08:52 EST

## 2025-02-22 LAB
ALBUMIN SERPL-MCNC: 3.3 G/DL (ref 3.5–5.2)
ALBUMIN/GLOB SERPL: 0.9 G/DL
ALP SERPL-CCNC: 91 U/L (ref 39–117)
ALT SERPL W P-5'-P-CCNC: 37 U/L (ref 1–33)
ANION GAP SERPL CALCULATED.3IONS-SCNC: 14 MMOL/L (ref 5–15)
AST SERPL-CCNC: 19 U/L (ref 1–32)
BILIRUB SERPL-MCNC: <0.2 MG/DL (ref 0–1.2)
BUN SERPL-MCNC: 64 MG/DL (ref 8–23)
BUN/CREAT SERPL: 51.6 (ref 7–25)
CALCIUM SPEC-SCNC: 8.9 MG/DL (ref 8.6–10.5)
CHLORIDE SERPL-SCNC: 92 MMOL/L (ref 98–107)
CO2 SERPL-SCNC: 22 MMOL/L (ref 22–29)
CREAT SERPL-MCNC: 1.24 MG/DL (ref 0.57–1)
DEPRECATED RDW RBC AUTO: 42.6 FL (ref 37–54)
EGFRCR SERPLBLD CKD-EPI 2021: 45.8 ML/MIN/1.73
ERYTHROCYTE [DISTWIDTH] IN BLOOD BY AUTOMATED COUNT: 13 % (ref 12.3–15.4)
GLOBULIN UR ELPH-MCNC: 3.6 GM/DL
GLUCOSE BLDC GLUCOMTR-MCNC: 138 MG/DL (ref 70–130)
GLUCOSE BLDC GLUCOMTR-MCNC: 217 MG/DL (ref 70–130)
GLUCOSE BLDC GLUCOMTR-MCNC: 270 MG/DL (ref 70–130)
GLUCOSE BLDC GLUCOMTR-MCNC: 331 MG/DL (ref 70–130)
GLUCOSE SERPL-MCNC: 135 MG/DL (ref 65–99)
HCT VFR BLD AUTO: 35.4 % (ref 34–46.6)
HGB BLD-MCNC: 11.2 G/DL (ref 12–15.9)
MCH RBC QN AUTO: 28.9 PG (ref 26.6–33)
MCHC RBC AUTO-ENTMCNC: 31.6 G/DL (ref 31.5–35.7)
MCV RBC AUTO: 91.5 FL (ref 79–97)
PLATELET # BLD AUTO: 545 10*3/MM3 (ref 140–450)
PMV BLD AUTO: 9.4 FL (ref 6–12)
POTASSIUM SERPL-SCNC: 3.8 MMOL/L (ref 3.5–5.2)
PROT SERPL-MCNC: 6.9 G/DL (ref 6–8.5)
RBC # BLD AUTO: 3.87 10*6/MM3 (ref 3.77–5.28)
SODIUM SERPL-SCNC: 128 MMOL/L (ref 136–145)
WBC NRBC COR # BLD AUTO: 13.56 10*3/MM3 (ref 3.4–10.8)

## 2025-02-22 PROCEDURE — 63710000001 INSULIN GLARGINE PER 5 UNITS: Performed by: INTERNAL MEDICINE

## 2025-02-22 PROCEDURE — 94760 N-INVAS EAR/PLS OXIMETRY 1: CPT

## 2025-02-22 PROCEDURE — 97530 THERAPEUTIC ACTIVITIES: CPT

## 2025-02-22 PROCEDURE — 94664 DEMO&/EVAL PT USE INHALER: CPT

## 2025-02-22 PROCEDURE — 25010000002 CEFTRIAXONE PER 250 MG: Performed by: INTERNAL MEDICINE

## 2025-02-22 PROCEDURE — 94761 N-INVAS EAR/PLS OXIMETRY MLT: CPT

## 2025-02-22 PROCEDURE — 63710000001 PREDNISONE PER 1 MG: Performed by: INTERNAL MEDICINE

## 2025-02-22 PROCEDURE — 63710000001 INSULIN LISPRO (HUMAN) PER 5 UNITS: Performed by: INTERNAL MEDICINE

## 2025-02-22 PROCEDURE — 80053 COMPREHEN METABOLIC PANEL: CPT | Performed by: INTERNAL MEDICINE

## 2025-02-22 PROCEDURE — 85027 COMPLETE CBC AUTOMATED: CPT | Performed by: INTERNAL MEDICINE

## 2025-02-22 PROCEDURE — 94799 UNLISTED PULMONARY SVC/PX: CPT

## 2025-02-22 PROCEDURE — 82948 REAGENT STRIP/BLOOD GLUCOSE: CPT

## 2025-02-22 RX ADMIN — GLIPIZIDE 5 MG: 5 TABLET ORAL at 08:46

## 2025-02-22 RX ADMIN — IPRATROPIUM BROMIDE AND ALBUTEROL SULFATE 3 ML: 2.5; .5 SOLUTION RESPIRATORY (INHALATION) at 19:59

## 2025-02-22 RX ADMIN — ASPIRIN 81 MG: 81 TABLET, COATED ORAL at 22:21

## 2025-02-22 RX ADMIN — FUROSEMIDE 20 MG: 20 TABLET ORAL at 08:46

## 2025-02-22 RX ADMIN — IPRATROPIUM BROMIDE AND ALBUTEROL SULFATE 3 ML: 2.5; .5 SOLUTION RESPIRATORY (INHALATION) at 13:36

## 2025-02-22 RX ADMIN — INSULIN LISPRO 12 UNITS: 100 INJECTION, SOLUTION INTRAVENOUS; SUBCUTANEOUS at 22:21

## 2025-02-22 RX ADMIN — IPRATROPIUM BROMIDE AND ALBUTEROL SULFATE 3 ML: 2.5; .5 SOLUTION RESPIRATORY (INHALATION) at 08:12

## 2025-02-22 RX ADMIN — Medication 10 ML: at 08:46

## 2025-02-22 RX ADMIN — CARVEDILOL 12.5 MG: 12.5 TABLET, FILM COATED ORAL at 08:46

## 2025-02-22 RX ADMIN — OXYCODONE HYDROCHLORIDE 5 MG: 5 TABLET ORAL at 14:27

## 2025-02-22 RX ADMIN — INSULIN LISPRO 5 UNITS: 100 INJECTION, SOLUTION INTRAVENOUS; SUBCUTANEOUS at 12:12

## 2025-02-22 RX ADMIN — ASPIRIN 81 MG: 81 TABLET, COATED ORAL at 08:46

## 2025-02-22 RX ADMIN — CARVEDILOL 12.5 MG: 12.5 TABLET, FILM COATED ORAL at 16:39

## 2025-02-22 RX ADMIN — PREDNISONE 20 MG: 20 TABLET ORAL at 08:46

## 2025-02-22 RX ADMIN — OXYCODONE HYDROCHLORIDE 5 MG: 5 TABLET ORAL at 22:21

## 2025-02-22 RX ADMIN — INSULIN LISPRO 8 UNITS: 100 INJECTION, SOLUTION INTRAVENOUS; SUBCUTANEOUS at 16:39

## 2025-02-22 RX ADMIN — EMPAGLIFLOZIN 25 MG: 25 TABLET, FILM COATED ORAL at 08:46

## 2025-02-22 RX ADMIN — INSULIN GLARGINE 10 UNITS: 100 INJECTION, SOLUTION SUBCUTANEOUS at 22:21

## 2025-02-22 RX ADMIN — CEFTRIAXONE 2000 MG: 2 INJECTION, POWDER, FOR SOLUTION INTRAMUSCULAR; INTRAVENOUS at 08:45

## 2025-02-22 NOTE — PROGRESS NOTES
"RENAL/KCC:     LOS: 12 days     Chief Complaint/ Reason for encounter: CKD management    Subjective   Chart reviewed  No new complaints  Denies sob or chest pain     Vital Signs  Temp:  [97.3 °F (36.3 °C)-98.3 °F (36.8 °C)] 97.8 °F (36.6 °C)  Heart Rate:  [79-98] 79  Resp:  [16-20] 18  BP: (120-150)/(73-84) 125/84       Wt Readings from Last 1 Encounters:   02/10/25 0330 92.5 kg (204 lb)       Objective:  Vital signs: (most recent): Blood pressure 121/79, pulse 90, temperature 97.6 °F (36.4 °C), temperature source Oral, resp. rate 20, height 170.2 cm (67\"), weight 92.5 kg (204 lb), SpO2 95%.            Objective:  General Appearance:  Comfortable, obese, well-appearing, in no acute distress and not in pain.  HEENT: Mucous membranes moist  Lungs:  Normal effort and normal respiratory rate.  Breath sounds clear to auscultation: No rhonchi/Rales.  No  respiratory distress.   Heart:  S1, S2 normal.  No murmur.   Abdomen: Abdomen is soft, nontender/nondistended, + bowel sounds  Extremities: Trace edema of bilateral lower extremities  Skin:  Warm and dry with no rashes      Results Review:    Intake/Output:     Intake/Output Summary (Last 24 hours) at 2/22/2025 1031  Last data filed at 2/22/2025 0317  Gross per 24 hour   Intake --   Output 400 ml   Net -400 ml         DATA:  Radiology and Labs:  The following labs independently reviewed by me. Additional labs ordered for tomorrow a.m.  Interval notes, chart personally reviewed by me.   Old records independently reviewed showing CKD 3  Discussed with patient    Risk/ complexity of medical care/ medical decision making moderate, diuretic management with CKD      Labs:   Recent Results (from the past 24 hours)   Basic Metabolic Panel    Collection Time: 02/21/25  2:18 PM    Specimen: Blood   Result Value Ref Range    Glucose 331 (H) 65 - 99 mg/dL    BUN 60 (H) 8 - 23 mg/dL    Creatinine 1.45 (H) 0.57 - 1.00 mg/dL    Sodium 134 (L) 136 - 145 mmol/L    Potassium 4.4 3.5 - 5.2 " mmol/L    Chloride 94 (L) 98 - 107 mmol/L    CO2 23.1 22.0 - 29.0 mmol/L    Calcium 8.8 8.6 - 10.5 mg/dL    BUN/Creatinine Ratio 41.4 (H) 7.0 - 25.0    Anion Gap 16.9 (H) 5.0 - 15.0 mmol/L    eGFR 37.9 (L) >60.0 mL/min/1.73   POC Glucose Once    Collection Time: 02/21/25  6:17 PM    Specimen: Blood   Result Value Ref Range    Glucose 419 (C) 70 - 130 mg/dL   POC Glucose Once    Collection Time: 02/21/25  9:36 PM    Specimen: Blood   Result Value Ref Range    Glucose 368 (H) 70 - 130 mg/dL   Comprehensive Metabolic Panel    Collection Time: 02/22/25  3:44 AM    Specimen: Blood   Result Value Ref Range    Glucose 135 (H) 65 - 99 mg/dL    BUN 64 (H) 8 - 23 mg/dL    Creatinine 1.24 (H) 0.57 - 1.00 mg/dL    Sodium 128 (L) 136 - 145 mmol/L    Potassium 3.8 3.5 - 5.2 mmol/L    Chloride 92 (L) 98 - 107 mmol/L    CO2 22.0 22.0 - 29.0 mmol/L    Calcium 8.9 8.6 - 10.5 mg/dL    Total Protein 6.9 6.0 - 8.5 g/dL    Albumin 3.3 (L) 3.5 - 5.2 g/dL    ALT (SGPT) 37 (H) 1 - 33 U/L    AST (SGOT) 19 1 - 32 U/L    Alkaline Phosphatase 91 39 - 117 U/L    Total Bilirubin <0.2 0.0 - 1.2 mg/dL    Globulin 3.6 gm/dL    A/G Ratio 0.9 g/dL    BUN/Creatinine Ratio 51.6 (H) 7.0 - 25.0    Anion Gap 14.0 5.0 - 15.0 mmol/L    eGFR 45.8 (L) >60.0 mL/min/1.73   CBC (No Diff)    Collection Time: 02/22/25  3:44 AM    Specimen: Blood   Result Value Ref Range    WBC 13.56 (H) 3.40 - 10.80 10*3/mm3    RBC 3.87 3.77 - 5.28 10*6/mm3    Hemoglobin 11.2 (L) 12.0 - 15.9 g/dL    Hematocrit 35.4 34.0 - 46.6 %    MCV 91.5 79.0 - 97.0 fL    MCH 28.9 26.6 - 33.0 pg    MCHC 31.6 31.5 - 35.7 g/dL    RDW 13.0 12.3 - 15.4 %    RDW-SD 42.6 37.0 - 54.0 fl    MPV 9.4 6.0 - 12.0 fL    Platelets 545 (H) 140 - 450 10*3/mm3   POC Glucose Once    Collection Time: 02/22/25  7:10 AM    Specimen: Blood   Result Value Ref Range    Glucose 138 (H) 70 - 130 mg/dL       Radiology:  Pertinent radiology studies were reviewed as described above      Medications have been reviewed  separately in chart review medication tab      ASSESSMENT:  CKD stage IIIa, stable, near baseline  Bacterial pneumonia, on IV/p.o. antibiotics  Sepsis, unspecified organism, improving with antibiotic therapy  Influenza A  Chronic diastolic CHF, on oral diuretics   Type 2 diabetes mellitus with hyperglycemia, with long-term current use of insulin  Acute respiratory failure with hypoxia, improved, on room air           DISCUSSION/PLAN:   Creatinine level has been stable near baseline creatinine of around 1.4-1.8 na is now trending down.  Clinically euvolemic on exam on oral Lasix dose: Continue  Fluid restriction now.   Antibiotics per infectious disease, dosed for GFR around 40  She would need a few more weeks of IV antibiotics.  I think her chance of progressing to ESRD is pretty low so okay to place midline immediately prior to discharge with prompt removal once IV antibiotics are complete  Her potassium has been stable off Lokelma  Continue Jardiance  Discharge planning      Antionette Patel MD  [facility]   Office phone number: [phone]  Answering service phone number: [phone]  02/22/25  10:31 EST

## 2025-02-22 NOTE — PLAN OF CARE
Goal Outcome Evaluation:  Plan of Care Reviewed With: patient        Progress: no change  Outcome Evaluation: Pt in bathroom with nsg upon beginning of session, required CGA for STS t/f and amb 12' to recliner with forward flexed posture and slight L antalgia. Pt refused further ambulation due to fatigue. Pt would benefit from continued skilled PT to improve mobility and functional endurance.    Anticipated Discharge Disposition (PT): skilled nursing facility

## 2025-02-22 NOTE — THERAPY TREATMENT NOTE
Patient Name: Chrissy Gutierrez  : 1950    MRN: [id number]                              Today's Date: 2025       Admit Date: 2/10/2025    Visit Dx:     ICD-10-CM ICD-9-CM   1. Acute respiratory failure with hypoxia  J96.01 518.81   2. Pneumonia of right lower lobe due to infectious organism  J18.9 486   3. Acute pain of both knees  M25.561 338.19    M25.562 719.46   4. Generalized weakness  R53.1 780.79   5. SONI (acute kidney injury)  N17.9 584.9   6. Acute UTI  N39.0 599.0   7. Septic arthritis of knee, left  M00.9 711.06   8. Follow-up exam  Z09 V67.9     Patient Active Problem List   Diagnosis    Right lower lobe pneumonia    Sepsis, unspecified organism    Influenza A    Acute pain of left knee    Bacterial pneumonia    Type 2 diabetes mellitus with hyperglycemia, with long-term current use of insulin    CKD (chronic kidney disease) stage 3, GFR 30-59 ml/min    Acute respiratory failure with hypoxia     Past Medical History:   Diagnosis Date    Cancer     right kidney    Chronic kidney disease     Hypertension     Low back pain     Osteoarthritis     Peripheral neuropathy      Past Surgical History:   Procedure Laterality Date    BACK SURGERY      EPIDURAL BLOCK      KIDNEY SURGERY Right 2017    REMOVED RIGHT KIDNEY    KNEE INCISION AND DRAINAGE Left 2025    Procedure: KNEE INCISION AND DRAINAGE;  Surgeon: Arvind Ibarra MD;  Location: [facility];  Service: Orthopedics;  Laterality: Left;    LUMBAR DISCECTOMY FUSION INSTRUMENTATION N/A 2017    Procedure: 1 LEVEL LAMINECTOMY DECOMPRESSION L4 5 EXCISION FACET CYST;  Surgeon: Negrito Oconnell DO;  Location: [facility];  Service:     TUBAL ABDOMINAL LIGATION      WISDOM TOOTH EXTRACTION        General Information       Row Name 25 1557          Physical Therapy Time and Intention    Document Type therapy note (daily note)  -CN     Mode of Treatment individual therapy;physical therapy  -CN       Row Name 25 1558           General Information    Patient Profile Reviewed yes  -CN     Existing Precautions/Restrictions fall  -CN       Row Name 02/22/25 1557          Cognition    Orientation Status (Cognition) oriented x 3  -CN       Row Name 02/22/25 1557          Safety Issues/Impairments Affecting Functional Mobility    Impairments Affecting Function (Mobility) balance;pain;shortness of breath;strength;endurance/activity tolerance  -CN               User Key  (r) = Recorded By, (t) = Taken By, (c) = Cosigned By      Initials Name Provider Type    Tiffany Lindquist, ISABEL Physical Therapist                   Mobility       Row Name 02/22/25 1557          Bed Mobility    Comment, (Bed Mobility) Sitting on toilet at beginning of session  -CN       Row Name 02/22/25 1557          Sit-Stand Transfer    Sit-Stand [place] (Transfers) contact guard  -CN     Assistive Device (Sit-Stand Transfers) walker, front-wheeled  -CN       Row Name 02/22/25 1557          Gait/Stairs (Locomotion)    [place] Level (Gait) contact guard  -CN     Assistive Device (Gait) walker, front-wheeled  -CN     Distance in Feet (Gait) 12  -CN     Deviations/Abnormal Patterns (Gait) trinh decreased;gait speed decreased  -CN     Bilateral Gait Deviations forward flexed posture  -CN     Comment, (Gait/Stairs) Amb back to chair from bathroom, refused further ambuluation due to fatigue.  -CN               User Key  (r) = Recorded By, (t) = Taken By, (c) = Cosigned By      Initials Name Provider Type    Tiffany Lindquist, ISABEL Physical Therapist                   Obj/Interventions    No documentation.                  Goals/Plan    No documentation.                  Clinical Impression       Row Name 02/22/25 1559          Pain    Pretreatment Pain Rating 4/10  -CN     Posttreatment Pain Rating 4/10  -CN     Pain Location knee  -CN     Pain Side/Orientation left  -CN       Row Name 02/22/25 1559          Plan of Care Review    Plan of Care Reviewed  With patient  -CN     Progress no change  -CN     Outcome Evaluation Pt in bathroom with nsg upon beginning of session, required CGA for STS t/f and amb 12' to recliner with forward flexed posture and slight L antalgia. Pt refused further ambulation due to fatigue. Pt would benefit from continued skilled PT to improve mobility and functional endurance.  -CN       Row Name 02/22/25 1559          Therapy Assessment/Plan (PT)    Rehab Potential (PT) fair  -CN     Criteria for Skilled Interventions Met (PT) yes  -CN     Therapy Frequency (PT) 5 times/wk  -CN       Row Name 02/22/25 155          Positioning and Restraints    Pre-Treatment Position bathroom  -CN     Post Treatment Position chair  -CN     In Chair reclined;call light within reach;encouraged to call for assist;exit alarm on;legs elevated  -CN               User Key  (r) = Recorded By, (t) = Taken By, (c) = Cosigned By      Initials Name Provider Type    Tiffany Lindquist, PT Physical Therapist                   Outcome Measures       Row Name 02/22/25 1601          How much help from another person do you currently need...    Turning from your back to your side while in flat bed without using bedrails? 3  -CN     Moving from lying on back to sitting on the side of a flat bed without bedrails? 3  -CN     Moving to and from a bed to a chair (including a wheelchair)? 3  -CN     Standing up from a chair using your arms (e.g., wheelchair, bedside chair)? 3  -CN     Climbing 3-5 steps with a railing? 2  -CN     To walk in hospital room? 3  -CN     AM-PAC 6 Clicks Score (PT) 17  -CN     Highest Level of Mobility Goal 5 --> Static standing  -CN       Row Name 02/22/25 1601          Functional Assessment    Outcome Measure Options AM-PAC 6 Clicks Basic Mobility (PT)  -CN               User Key  (r) = Recorded By, (t) = Taken By, (c) = Cosigned By      Initials Name Provider Type    Tiffany Lindquist, ISABEL Physical Therapist                                  Physical Therapy Education       Title: PT OT SLP Therapies (Done)       Topic: Physical Therapy (Done)       Point: Mobility training (Done)       Learning Progress Summary            Patient Acceptance, E, VU,NR by CN at 2/22/2025 1601    Acceptance, E, DU by ZT at 2/21/2025 1015    Acceptance, E, DU by ZT at 2/20/2025 1419    Acceptance, E,D, VU,NR by MS at 2/15/2025 1635    Acceptance, E, NR by CS at 2/11/2025 1323                      Point: Home exercise program (Done)       Learning Progress Summary            Patient Acceptance, E, VU,NR by CN at 2/22/2025 1601    Acceptance, E, DU by ZT at 2/21/2025 1015    Acceptance, E, DU by ZT at 2/20/2025 1419    Acceptance, E, NR by CS at 2/11/2025 1323                                      User Key       Initials Effective Dates Name Provider Type Discipline    MS 06/16/21 -  Jed Mark, PT Physical Therapist PT    CN 06/16/21 -  Tiffany Hguhes, PT Physical Therapist PT    CS 09/06/24 -  Shen Gutierrez, PT Physical Therapist PT    ZT 01/29/25 -  Roderick Casey, PT Student PT Student PT                  PT Recommendation and Plan     Progress: no change  Outcome Evaluation: Pt in bathroom with nsg upon beginning of session, required CGA for STS t/f and amb 12' to recliner with forward flexed posture and slight L antalgia. Pt refused further ambulation due to fatigue. Pt would benefit from continued skilled PT to improve mobility and functional endurance.     Time Calculation:         PT Charges       Row Name 02/22/25 1601             Time Calculation    Start Time 1357  -CN      Stop Time 1408  -CN      Time Calculation (min) 11 min  -CN      PT Received On 02/22/25  -CN      PT - Next Appointment 02/24/25  -CN         Timed Charges    40069 - PT Therapeutic Activity Minutes 11  -CN         Total Minutes    Timed Charges Total Minutes 11  -CN       Total Minutes 11  -CN                User Key  (r) = Recorded By, (t) = Taken By, (c) = Cosigned  By      Initials Name Provider Type    CN Tiffany Hughes, PT Physical Therapist                  Therapy Charges for Today       Code Description Service Date Service Provider Modifiers Qty    66145060616 HC PT THERAPEUTIC ACT EA 15 MIN 2/22/2025 Tiffany Hughes, PT GP 1            PT G-Codes  Outcome Measure Options: AM-PAC 6 Clicks Basic Mobility (PT)  AM-PAC 6 Clicks Score (PT): 17  PT Discharge Summary  Anticipated Discharge Disposition (PT): skilled nursing facility    Tfifany Hughes PT  2/22/2025

## 2025-02-22 NOTE — PLAN OF CARE
Problem: Adult Inpatient Plan of Care  Goal: Absence of Hospital-Acquired Illness or Injury  Intervention: Identify and Manage Fall Risk  Recent Flowsheet Documentation  Taken 2/22/2025 0200 by Johnny Amaro, RN  Safety Promotion/Fall Prevention:   activity supervised   assistive device/personal items within reach   clutter free environment maintained   safety round/check completed   nonskid shoes/slippers when out of bed  Taken 2/21/2025 2144 by Johnny Amaro, RN  Safety Promotion/Fall Prevention:   activity supervised   assistive device/personal items within reach   clutter free environment maintained   safety round/check completed   nonskid shoes/slippers when out of bed   Goal Outcome Evaluation:

## 2025-02-22 NOTE — PROGRESS NOTES
"  Infectious Diseases Progress Note    Roxanne Duncan MD     [facility]  Los: 12 days  Patient Identification:  Name: Chrissy Gutierrez  Age: 74 y.o.  Sex: female  :  1950  MRN: [id number]         Primary Care Physician: Jai Steven MD        Subjective: Continues to improve awaiting decision from insurance company for eventual disposition.  Tolerating antibiotics without any problem.  Decreased pain and discomfort in her joints.  Interval History: See consultation note.    Objective:    Scheduled Meds:aspirin, 81 mg, Oral, BID  carvedilol, 12.5 mg, Oral, BID With Meals  cefTRIAXone, 2,000 mg, Intravenous, Q24H  empagliflozin, 25 mg, Oral, Daily  furosemide, 20 mg, Oral, Daily  glipizide, 5 mg, Oral, QAM AC  insulin glargine, 10 Units, Subcutaneous, Nightly  insulin lispro, 3-14 Units, Subcutaneous, 4x Daily AC & at Bedtime  ipratropium-albuterol, 3 mL, Nebulization, Q6H While Awake - RT  predniSONE, 20 mg, Oral, Daily With Breakfast  sodium chloride, 10 mL, Intravenous, Q12H      Continuous Infusions:     Vital signs in last 24 hours:  Temp:  [97.3 °F (36.3 °C)-98.3 °F (36.8 °C)] 97.8 °F (36.6 °C)  Heart Rate:  [79-98] 79  Resp:  [16-20] 18  BP: (120-150)/(73-84) 125/84    Intake/Output:    Intake/Output Summary (Last 24 hours) at 2025 1110  Last data filed at 2025 0317  Gross per 24 hour   Intake --   Output 400 ml   Net -400 ml       Exam:  /84 (BP Location: Right arm)   Pulse 79   Temp 97.8 °F (36.6 °C) (Oral)   Resp 18   Ht 170.2 cm (67\")   Wt 92.5 kg (204 lb)   SpO2 96%   BMI 31.95 kg/m²   Patient is examined using the personal protective equipment as per guidelines from infection control for this particular patient as enacted.  Hand washing was performed before and after patient interaction.  General Appearance:    Alert, cooperative, no distress, AAOx3                          Head:    Normocephalic, without obvious abnormality, atraumatic                   "         Eyes:    PERRL, conjunctivae/corneas clear, EOM's intact, both eyes                         Throat:   Lips, tongue, gums normal; oral mucosa pink and moist                           Neck:   Supple, symmetrical, trachea midline, no JVD                         Lungs:    Clear to auscultation bilaterally, respirations unlabored                 Chest Wall:    No tenderness or deformity                          Heart:  S1-S2 regular                  Abdomen:   Soft nontender                 Extremities: Left knee and lower extremities wrapped in the Ace wrap.                        Pulses:   Pulses palpable in all extremities                            Skin:   Skin is warm and dry,  no rashes or palpable lesions                  Neurologic: Alert and oriented x 3       Data Review:    I reviewed the patient's new clinical results.  Results from last 7 days   Lab Units 02/22/25  0344 02/19/25  0442 02/18/25  0631 02/17/25  0908 02/16/25  0417   WBC 10*3/mm3 13.56* 13.74* 13.47* 12.74* 11.70*   HEMOGLOBIN g/dL 11.2* 11.2* 11.1* 10.9* 10.6*   PLATELETS 10*3/mm3 545* 601* 577* 575* 483*     Results from last 7 days   Lab Units 02/22/25  0344 02/21/25  1418 02/19/25  0442 02/18/25  0631 02/17/25  0909 02/16/25  0417   SODIUM mmol/L 128* 134* 138 138 138 139   POTASSIUM mmol/L 3.8 4.4 4.5 4.6 4.2 4.8   CHLORIDE mmol/L 92* 94* 100 100 99 102   CO2 mmol/L 22.0 23.1 24.9 23.1 24.7 22.9   BUN mg/dL 64* 60* 74* 71* 65* 66*   CREATININE mg/dL 1.24* 1.45* 1.53* 1.37* 1.31* 1.49*   CALCIUM mg/dL 8.9 8.8 9.5 9.2 9.1 8.7   GLUCOSE mg/dL 135* 331* 158* 188* 223* 240*     Microbiology Results (last 10 days)       Procedure Component Value - Date/Time    Tissue / Bone Culture - Tissue, Knee, Left [[id number]] Collected: 02/12/25 1341    Lab Status: Final result Specimen: Tissue from Knee, Left Updated: 02/15/25 0742     Tissue Culture No growth at 3 days     Gram Stain No WBCs or organisms seen    Anaerobic Culture - Synovial Fluid,  Knee, Left [[id number]]  (Normal) Collected: 02/12/25 1327    Lab Status: Final result Specimen: Synovial Fluid from Knee, Left Updated: 02/17/25 0710     Anaerobic Culture No anaerobes isolated at 5 days    Body Fluid Culture - Synovial Fluid, Knee, Left [[id number]] Collected: 02/12/25 1327    Lab Status: Final result Specimen: Synovial Fluid from Knee, Left Updated: 02/17/25 0742     Body Fluid Culture No growth at 5 days     Gram Stain Many (4+) WBCs seen      No organisms seen              Assessment:    Bacterial pneumonia    Sepsis, unspecified organism    Influenza A    Acute pain of left knee    Type 2 diabetes mellitus with hyperglycemia, with long-term current use of insulin    CKD (chronic kidney disease) stage 3, GFR 30-59 ml/min    Acute respiratory failure with hypoxia    74-year-old female with  1-painful tender left knee with decline in function status post joint aspiration followed by I&D and washout in the setting of respiratory tract infection with influenza A and abnormal urinalysis consistent with UTI-this presentation of tender painful knee with worsening function and abnormal joint fluid analysis with crystal for uric acid positive could very well be due to combination of multiple pathologies happening together:             -Acute gouty arthritis with             -Secondary septic arthritis due to hematogenous seeding from the lung or urine versus             -Progression of osteoarthritis.  2-acute influenza A with secondary bacterial pneumonia  3-abnormal urinalysis with urine culture without any specific urinary symptoms  4-acute on chronic renal insufficiency with prior history of left-sided hydronephrosis and history of right nephrectomy  5-osteoarthritis of the right shoulder and wrist without any fracture  6-hypertension  7-peripheral neuropathy  8-history of renal cell carcinoma-history of right nephrectomy  9-type 2 diabetes.     Recommendations/Discussions:  See my discussion and  recommendations on 2/13/2025 and 2/14/2025.  Management of concomitant gout with short course of oral steroid per primary team and orthopedic surgery service.  Patient will need out of hospital follow-up with orthopedic surgery service as per their recommendations  See discussion in the additional progress note on 2/14/2025 for alternate oral antibiotic therapy as a second line option which is better than taking no treatment as she is still very reluctant for IV treatment as recommended:  Patient is very concerned about her ability to get IV antibiotics and was hoping that she would get oral antibiotics for her infection.  I explained the patient about ideal treatment for this situation as recommended in my progress note and orders for the case management.  If patient after being explained the risks and benefits of her decision making after using oral antibiotics over IV antibiotics makes the decision to go with oral antibiotics then she could be switched to oral Keflex 500 mg every 8 hours for 28 days from 2/11/2025.  With ongoing continued management of gout  Patient will need close follow-up with the primary care provider with once weekly lab work to monitor antibiotic toxicity and side effects with instructions to reach out to Dr. Duncan on a once a week basis to go over review of system to monitor antibiotic toxicity and side effects and have a primary care provider reach out to me if there is need for discussion of further management of her left knee infection.  Ideal treatment for this presentation is discussed will be 4 weeks of IV Rocephin from last surgical intervention on 2/11/2025.  Following orders are written for case management:   Please arrange for 4 weeks of IV Rocephin at 2 g 24 hours starting 2/11/2025.  Check weekly CBC CMP and CRP and call abnormal results at [phone].  Remove midline/PICC line after completion of antibiotic therapy  Please educate patient to reach out to Dr. Duncan on once a  week basis at [phone] to go over review of system to monitor antibiotic toxicity and effectiveness of treatment.  Diagnosis:  1-painful tender left knee with decline in function status post joint aspiration followed by I&D and washout in the setting of respiratory tract infection with influenza A and abnormal urinalysis consistent with UTI-this presentation of tender painful knee with worsening function and abnormal joint fluid analysis with crystal for uric acid positive could very well be due to combination of multiple pathologies happening together:             -Acute gouty arthritis with             -Secondary septic arthritis due to hematogenous seeding from the lung or urine versus             -Progression of osteoarthritis.  2-acute influenza A with secondary bacterial pneumonia  3-abnormal urinalysis with urine culture without any specific urinary symptoms  Roxanne Duncan MD  2/22/2025  11:10 EST    Parts of this note may be an electronic transcription/translation of spoken language to printed text using the Dragon dictation system.

## 2025-02-22 NOTE — PLAN OF CARE
Goal Outcome Evaluation:  Plan of Care Reviewed With: patient        Progress: improving  Outcome Evaluation: vss. telemetry monitor, sr/st. alert and oriented x4, room air. up with assist x1 chair/brp. fluid restriction started, non-compliant, educated patient.

## 2025-02-23 LAB
ANION GAP SERPL CALCULATED.3IONS-SCNC: 13.4 MMOL/L (ref 5–15)
BUN SERPL-MCNC: 55 MG/DL (ref 8–23)
BUN/CREAT SERPL: 45.1 (ref 7–25)
CALCIUM SPEC-SCNC: 9 MG/DL (ref 8.6–10.5)
CHLORIDE SERPL-SCNC: 100 MMOL/L (ref 98–107)
CO2 SERPL-SCNC: 24.6 MMOL/L (ref 22–29)
CREAT SERPL-MCNC: 1.22 MG/DL (ref 0.57–1)
EGFRCR SERPLBLD CKD-EPI 2021: 46.7 ML/MIN/1.73
GLUCOSE BLDC GLUCOMTR-MCNC: 106 MG/DL (ref 70–130)
GLUCOSE BLDC GLUCOMTR-MCNC: 253 MG/DL (ref 70–130)
GLUCOSE BLDC GLUCOMTR-MCNC: 269 MG/DL (ref 70–130)
GLUCOSE BLDC GLUCOMTR-MCNC: 295 MG/DL (ref 70–130)
GLUCOSE SERPL-MCNC: 108 MG/DL (ref 65–99)
POTASSIUM SERPL-SCNC: 3.7 MMOL/L (ref 3.5–5.2)
SODIUM SERPL-SCNC: 138 MMOL/L (ref 136–145)

## 2025-02-23 PROCEDURE — 82948 REAGENT STRIP/BLOOD GLUCOSE: CPT

## 2025-02-23 PROCEDURE — 94664 DEMO&/EVAL PT USE INHALER: CPT

## 2025-02-23 PROCEDURE — 63710000001 INSULIN GLARGINE PER 5 UNITS: Performed by: INTERNAL MEDICINE

## 2025-02-23 PROCEDURE — 94799 UNLISTED PULMONARY SVC/PX: CPT

## 2025-02-23 PROCEDURE — 94760 N-INVAS EAR/PLS OXIMETRY 1: CPT

## 2025-02-23 PROCEDURE — 25010000002 CEFTRIAXONE PER 250 MG: Performed by: INTERNAL MEDICINE

## 2025-02-23 PROCEDURE — 63710000001 PREDNISONE PER 1 MG: Performed by: INTERNAL MEDICINE

## 2025-02-23 PROCEDURE — 63710000001 INSULIN LISPRO (HUMAN) PER 5 UNITS: Performed by: INTERNAL MEDICINE

## 2025-02-23 PROCEDURE — 94761 N-INVAS EAR/PLS OXIMETRY MLT: CPT

## 2025-02-23 PROCEDURE — 80048 BASIC METABOLIC PNL TOTAL CA: CPT | Performed by: INTERNAL MEDICINE

## 2025-02-23 RX ORDER — PREDNISONE 10 MG/1
10 TABLET ORAL
Status: COMPLETED | OUTPATIENT
Start: 2025-02-24 | End: 2025-02-25

## 2025-02-23 RX ADMIN — EMPAGLIFLOZIN 25 MG: 25 TABLET, FILM COATED ORAL at 09:12

## 2025-02-23 RX ADMIN — IPRATROPIUM BROMIDE AND ALBUTEROL SULFATE 3 ML: 2.5; .5 SOLUTION RESPIRATORY (INHALATION) at 07:49

## 2025-02-23 RX ADMIN — FUROSEMIDE 20 MG: 20 TABLET ORAL at 09:12

## 2025-02-23 RX ADMIN — CARVEDILOL 12.5 MG: 12.5 TABLET, FILM COATED ORAL at 09:12

## 2025-02-23 RX ADMIN — CARVEDILOL 12.5 MG: 12.5 TABLET, FILM COATED ORAL at 16:53

## 2025-02-23 RX ADMIN — IPRATROPIUM BROMIDE AND ALBUTEROL SULFATE 3 ML: 2.5; .5 SOLUTION RESPIRATORY (INHALATION) at 19:17

## 2025-02-23 RX ADMIN — INSULIN GLARGINE 10 UNITS: 100 INJECTION, SOLUTION SUBCUTANEOUS at 20:38

## 2025-02-23 RX ADMIN — GLIPIZIDE 5 MG: 5 TABLET ORAL at 09:11

## 2025-02-23 RX ADMIN — OXYCODONE HYDROCHLORIDE 5 MG: 5 TABLET ORAL at 20:38

## 2025-02-23 RX ADMIN — ASPIRIN 81 MG: 81 TABLET, COATED ORAL at 20:38

## 2025-02-23 RX ADMIN — ASPIRIN 81 MG: 81 TABLET, COATED ORAL at 09:11

## 2025-02-23 RX ADMIN — INSULIN LISPRO 8 UNITS: 100 INJECTION, SOLUTION INTRAVENOUS; SUBCUTANEOUS at 23:12

## 2025-02-23 RX ADMIN — CEFTRIAXONE 2000 MG: 2 INJECTION, POWDER, FOR SOLUTION INTRAMUSCULAR; INTRAVENOUS at 09:12

## 2025-02-23 RX ADMIN — PREDNISONE 20 MG: 20 TABLET ORAL at 09:12

## 2025-02-23 RX ADMIN — INSULIN LISPRO 8 UNITS: 100 INJECTION, SOLUTION INTRAVENOUS; SUBCUTANEOUS at 12:12

## 2025-02-23 RX ADMIN — Medication 10 ML: at 09:12

## 2025-02-23 RX ADMIN — IPRATROPIUM BROMIDE AND ALBUTEROL SULFATE 3 ML: 2.5; .5 SOLUTION RESPIRATORY (INHALATION) at 11:54

## 2025-02-23 RX ADMIN — INSULIN LISPRO 8 UNITS: 100 INJECTION, SOLUTION INTRAVENOUS; SUBCUTANEOUS at 16:53

## 2025-02-23 RX ADMIN — OXYCODONE HYDROCHLORIDE 5 MG: 5 TABLET ORAL at 09:18

## 2025-02-23 NOTE — PLAN OF CARE
Goal Outcome Evaluation:  Plan of Care Reviewed With: patient        Progress: improving  Outcome Evaluation: vss. telemetry monitor, sr/st. alert and oriented x4, room air. up with standby assist, falls precautions. fluid restriction, non-compliant.

## 2025-02-23 NOTE — PROGRESS NOTES
"  Infectious Diseases Progress Note    Roxanne Duncan MD     [facility]  Los: 13 days  Patient Identification:  Name: Chrissy Gutierrez  Age: 74 y.o.  Sex: female  :  1950  MRN: [id number]         Primary Care Physician: Jai Steven MD        Subjective: Feeling better.  Tolerating antibiotics without any problem.  Joint discomfort is much improved.  Still awaiting decision about disposition.  Interval History: See consultation note.    Objective:    Scheduled Meds:aspirin, 81 mg, Oral, BID  carvedilol, 12.5 mg, Oral, BID With Meals  cefTRIAXone, 2,000 mg, Intravenous, Q24H  empagliflozin, 25 mg, Oral, Daily  furosemide, 20 mg, Oral, Daily  glipizide, 5 mg, Oral, QAM AC  insulin glargine, 10 Units, Subcutaneous, Nightly  insulin lispro, 3-14 Units, Subcutaneous, 4x Daily AC & at Bedtime  ipratropium-albuterol, 3 mL, Nebulization, Q6H While Awake - RT  predniSONE, 20 mg, Oral, Daily With Breakfast  sodium chloride, 10 mL, Intravenous, Q12H      Continuous Infusions:     Vital signs in last 24 hours:  Temp:  [96.6 °F (35.9 °C)-97.8 °F (36.6 °C)] 96.6 °F (35.9 °C)  Heart Rate:  [73-90] 73  Resp:  [16-20] 16  BP: (103-135)/(69-84) 135/84    Intake/Output:    Intake/Output Summary (Last 24 hours) at 2025 0703  Last data filed at 2025 2200  Gross per 24 hour   Intake 942 ml   Output --   Net 942 ml       Exam:  /84 (BP Location: Right arm, Patient Position: Lying)   Pulse 73   Temp 96.6 °F (35.9 °C) (Oral)   Resp 16   Ht 170.2 cm (67\")   Wt 92.5 kg (204 lb)   SpO2 94%   BMI 31.95 kg/m²   Patient is examined using the personal protective equipment as per guidelines from infection control for this particular patient as enacted.  Hand washing was performed before and after patient interaction.  General Appearance:    Alert, cooperative, no distress, AAOx3                          Head:    Normocephalic, without obvious abnormality, atraumatic                           Eyes: "    PERRL, conjunctivae/corneas clear, EOM's intact, both eyes                         Throat:   Lips, tongue, gums normal; oral mucosa pink and moist                           Neck:   Supple, symmetrical, trachea midline, no JVD                         Lungs:    Clear to auscultation bilaterally, respirations unlabored                 Chest Wall:    No tenderness or deformity                          Heart:  S1-S2 regular                  Abdomen:   Soft nontender                 Extremities: Left knee and lower extremities wrapped in the Ace wrap.                        Pulses:   Pulses palpable in all extremities                            Skin:   Skin is warm and dry,  no rashes or palpable lesions                  Neurologic: Alert and oriented x 3       Data Review:    I reviewed the patient's new clinical results.  Results from last 7 days   Lab Units 02/22/25  0344 02/19/25  0442 02/18/25  0631 02/17/25  0908   WBC 10*3/mm3 13.56* 13.74* 13.47* 12.74*   HEMOGLOBIN g/dL 11.2* 11.2* 11.1* 10.9*   PLATELETS 10*3/mm3 545* 601* 577* 575*     Results from last 7 days   Lab Units 02/22/25  0344 02/21/25  1418 02/19/25  0442 02/18/25  0631 02/17/25  0909   SODIUM mmol/L 128* 134* 138 138 138   POTASSIUM mmol/L 3.8 4.4 4.5 4.6 4.2   CHLORIDE mmol/L 92* 94* 100 100 99   CO2 mmol/L 22.0 23.1 24.9 23.1 24.7   BUN mg/dL 64* 60* 74* 71* 65*   CREATININE mg/dL 1.24* 1.45* 1.53* 1.37* 1.31*   CALCIUM mg/dL 8.9 8.8 9.5 9.2 9.1   GLUCOSE mg/dL 135* 331* 158* 188* 223*     Microbiology Results (last 10 days)       ** No results found for the last 240 hours. **              Assessment:    Bacterial pneumonia    Sepsis, unspecified organism    Influenza A    Acute pain of left knee    Type 2 diabetes mellitus with hyperglycemia, with long-term current use of insulin    CKD (chronic kidney disease) stage 3, GFR 30-59 ml/min    Acute respiratory failure with hypoxia    74-year-old female with  1-painful tender left knee with  decline in function status post joint aspiration followed by I&D and washout in the setting of respiratory tract infection with influenza A and abnormal urinalysis consistent with UTI-this presentation of tender painful knee with worsening function and abnormal joint fluid analysis with crystal for uric acid positive could very well be due to combination of multiple pathologies happening together:             -Acute gouty arthritis with             -Secondary septic arthritis due to hematogenous seeding from the lung or urine versus             -Progression of osteoarthritis.  2-acute influenza A with secondary bacterial pneumonia  3-abnormal urinalysis with urine culture without any specific urinary symptoms  4-acute on chronic renal insufficiency with prior history of left-sided hydronephrosis and history of right nephrectomy  5-osteoarthritis of the right shoulder and wrist without any fracture  6-hypertension  7-peripheral neuropathy  8-history of renal cell carcinoma-history of right nephrectomy  9-type 2 diabetes.     Recommendations/Discussions:  See my discussion and recommendations on 2/13/2025 and 2/14/2025.  Management of concomitant gout with short course of oral steroid per primary team and orthopedic surgery service.  Patient will need out of hospital follow-up with orthopedic surgery service as per their recommendations  See discussion in the additional progress note on 2/14/2025 for alternate oral antibiotic therapy as a second line option which is better than taking no treatment as she is still very reluctant for IV treatment as recommended:  Patient is very concerned about her ability to get IV antibiotics and was hoping that she would get oral antibiotics for her infection.  I explained the patient about ideal treatment for this situation as recommended in my progress note and orders for the case management.  If patient after being explained the risks and benefits of her decision making after  using oral antibiotics over IV antibiotics makes the decision to go with oral antibiotics then she could be switched to oral Keflex 500 mg every 8 hours for 28 days from 2/11/2025.  With ongoing continued management of gout  Patient will need close follow-up with the primary care provider with once weekly lab work to monitor antibiotic toxicity and side effects with instructions to reach out to Dr. Duncan on a once a week basis to go over review of system to monitor antibiotic toxicity and side effects and have a primary care provider reach out to me if there is need for discussion of further management of her left knee infection.  Ideal treatment for this presentation is discussed will be 4 weeks of IV Rocephin from last surgical intervention on 2/11/2025.  Following orders are written for case management:   Please arrange for 4 weeks of IV Rocephin at 2 g 24 hours starting 2/11/2025.  Check weekly CBC CMP and CRP and call abnormal results at [phone].  Remove midline/PICC line after completion of antibiotic therapy  Please educate patient to reach out to Dr. Duncan on once a week basis at [phone] to go over review of system to monitor antibiotic toxicity and effectiveness of treatment.  Diagnosis:  1-painful tender left knee with decline in function status post joint aspiration followed by I&D and washout in the setting of respiratory tract infection with influenza A and abnormal urinalysis consistent with UTI-this presentation of tender painful knee with worsening function and abnormal joint fluid analysis with crystal for uric acid positive could very well be due to combination of multiple pathologies happening together:             -Acute gouty arthritis with             -Secondary septic arthritis due to hematogenous seeding from the lung or urine versus             -Progression of osteoarthritis.  2-acute influenza A with secondary bacterial pneumonia  3-abnormal urinalysis with urine culture without any  specific urinary symptoms  Roxanne Duncan MD  2/23/2025  07:03 EST    Parts of this note may be an electronic transcription/translation of spoken language to printed text using the Dragon dictation system.

## 2025-02-23 NOTE — PROGRESS NOTES
"RENAL/KCC:     LOS: 13 days     Chief Complaint/ Reason for encounter: CKD management    Subjective   Chart reviewed  No new complaints  Denies sob or chest pain     Vital Signs  Temp:  [96.6 °F (35.9 °C)-97.7 °F (36.5 °C)] 97.3 °F (36.3 °C)  Heart Rate:  [] 115  Resp:  [16-20] 20  BP: (103-135)/(61-84) 118/61       Wt Readings from Last 1 Encounters:   02/10/25 0330 92.5 kg (204 lb)       Objective:  Vital signs: (most recent): Blood pressure 149/90, pulse 101, temperature 95 °F (35 °C), temperature source Oral, resp. rate 20, height 170.2 cm (67\"), weight 92.5 kg (204 lb), SpO2 93%.            Objective:  General Appearance:  Comfortable, obese, well-appearing, in no acute distress and not in pain.  HEENT: Mucous membranes moist  Lungs:  Normal effort and normal respiratory rate.  Breath sounds clear to auscultation: No rhonchi/Rales.  No  respiratory distress.   Heart:  S1, S2 normal.  No murmur.   Abdomen: Abdomen is soft, nontender/nondistended, + bowel sounds  Extremities: Trace edema of bilateral lower extremities  Skin:  Warm and dry with no rashes      Results Review:    Intake/Output:     Intake/Output Summary (Last 24 hours) at 2/23/2025 1213  Last data filed at 2/23/2025 0912  Gross per 24 hour   Intake 1142 ml   Output --   Net 1142 ml         DATA:  Radiology and Labs:  The following labs independently reviewed by me. Additional labs ordered for tomorrow a.m.  Interval notes, chart personally reviewed by me.   Old records independently reviewed showing CKD 3  Discussed with patient    Risk/ complexity of medical care/ medical decision making moderate, diuretic management with CKD      Labs:   Recent Results (from the past 24 hours)   POC Glucose Once    Collection Time: 02/22/25  3:58 PM    Specimen: Blood   Result Value Ref Range    Glucose 270 (H) 70 - 130 mg/dL   POC Glucose Once    Collection Time: 02/22/25  8:05 PM    Specimen: Blood   Result Value Ref Range    Glucose 331 (H) 70 - 130 mg/dL "   POC Glucose Once    Collection Time: 02/23/25  8:09 AM    Specimen: Blood   Result Value Ref Range    Glucose 106 70 - 130 mg/dL   Basic Metabolic Panel    Collection Time: 02/23/25  8:15 AM    Specimen: Blood   Result Value Ref Range    Glucose 108 (H) 65 - 99 mg/dL    BUN 55 (H) 8 - 23 mg/dL    Creatinine 1.22 (H) 0.57 - 1.00 mg/dL    Sodium 138 136 - 145 mmol/L    Potassium 3.7 3.5 - 5.2 mmol/L    Chloride 100 98 - 107 mmol/L    CO2 24.6 22.0 - 29.0 mmol/L    Calcium 9.0 8.6 - 10.5 mg/dL    BUN/Creatinine Ratio 45.1 (H) 7.0 - 25.0    Anion Gap 13.4 5.0 - 15.0 mmol/L    eGFR 46.7 (L) >60.0 mL/min/1.73   POC Glucose Once    Collection Time: 02/23/25 11:39 AM    Specimen: Blood   Result Value Ref Range    Glucose 253 (H) 70 - 130 mg/dL       Radiology:  Pertinent radiology studies were reviewed as described above      Medications have been reviewed separately in chart review medication tab      ASSESSMENT:  CKD stage IIIa, stable, near baseline  Bacterial pneumonia, on IV/p.o. antibiotics  Sepsis, unspecified organism, improving with antibiotic therapy  Influenza A  Chronic diastolic CHF, on oral diuretics   Type 2 diabetes mellitus with hyperglycemia, with long-term current use of insulin  Acute respiratory failure with hypoxia, improved, on room air           DISCUSSION/PLAN:   Creatinine level has been stable near baseline creatinine of around 1.4-1.8 na at goal today  Clinically euvolemic on exam on oral Lasix dose: Continue  Continue fluid restriction   Antibiotics per infectious disease, dosed for GFR around 40  She would need a few more weeks of IV antibiotics.  I think her chance of progressing to ESRD is pretty low so okay to place midline immediately prior to discharge with prompt removal once IV antibiotics are complete  Her potassium has been stable off Lokelma  Continue Jardiance  Discharge planning      Antionette Patel MD  [facility]   Office phone number: [phone]  Answering service  phone number: [phone]  02/23/25  12:13 EST

## 2025-02-23 NOTE — PROGRESS NOTES
" LOS: 13 days     Name: Chrissy Gutierrez  Age: 74 y.o.  Sex: female  :  1950  MRN: [id number]         Primary Care Physician: Jai Steven MD    Subjective   Subjective  No new complaints or acute overnight events    Objective   Vital Signs  Temp:  [96.6 °F (35.9 °C)-97.7 °F (36.5 °C)] 97.3 °F (36.3 °C)  Heart Rate:  [] 115  Resp:  [16-20] 18  BP: (103-135)/(61-84) 118/61  Body mass index is 31.95 kg/m².    Objective:  General Appearance:  Comfortable and in no acute distress.    Vital signs: (most recent): Blood pressure 118/61, pulse 115, temperature 97.3 °F (36.3 °C), temperature source Oral, resp. rate 18, height 170.2 cm (67\"), weight 92.5 kg (204 lb), SpO2 93%.    Lungs:  Normal effort and normal respiratory rate.    Heart: Normal rate.  Regular rhythm.    Abdomen: Abdomen is soft.  Bowel sounds are normal.   There is no abdominal tenderness.     Extremities: There is no dependent edema or local swelling.    Neurological: Patient is alert and oriented to person, place and time.    Skin:  Warm and dry.                Results Review:       I reviewed the patient's new clinical results.    Results from last 7 days   Lab Units 25  0344 25  0442 25  0631 25  0908   WBC 10*3/mm3 13.56* 13.74* 13.47* 12.74*   HEMOGLOBIN g/dL 11.2* 11.2* 11.1* 10.9*   PLATELETS 10*3/mm3 545* 601* 577* 575*     Results from last 7 days   Lab Units 25  0815 25  0344 25  1418 25  0442 25  0631 25  0909   SODIUM mmol/L 138 128* 134* 138 138 138   POTASSIUM mmol/L 3.7 3.8 4.4 4.5 4.6 4.2   CHLORIDE mmol/L 100 92* 94* 100 100 99   CO2 mmol/L 24.6 22.0 23.1 24.9 23.1 24.7   BUN mg/dL 55* 64* 60* 74* 71* 65*   CREATININE mg/dL 1.22* 1.24* 1.45* 1.53* 1.37* 1.31*   CALCIUM mg/dL 9.0 8.9 8.8 9.5 9.2 9.1   GLUCOSE mg/dL 108* 135* 331* 158* 188* 223*                 Scheduled Meds:   aspirin, 81 mg, Oral, BID  carvedilol, 12.5 mg, Oral, BID With Meals  cefTRIAXone, " 2,000 mg, Intravenous, Q24H  empagliflozin, 25 mg, Oral, Daily  furosemide, 20 mg, Oral, Daily  glipizide, 5 mg, Oral, QAM AC  insulin glargine, 10 Units, Subcutaneous, Nightly  insulin lispro, 3-14 Units, Subcutaneous, 4x Daily AC & at Bedtime  ipratropium-albuterol, 3 mL, Nebulization, Q6H While Awake - RT  predniSONE, 20 mg, Oral, Daily With Breakfast  sodium chloride, 10 mL, Intravenous, Q12H      PRN Meds:     senna-docusate sodium **AND** polyethylene glycol **AND** bisacodyl **AND** bisacodyl    calcium carbonate    dextrose    dextrose    glucagon (human recombinant)    ipratropium-albuterol    methocarbamol    nitroglycerin    ondansetron ODT **OR** ondansetron    oxyCODONE    sodium chloride    sodium chloride    sodium chloride  Continuous Infusions:       Assessment & Plan   Active Hospital Problems    Diagnosis  POA    **Bacterial pneumonia [J15.9]  Yes    Sepsis, unspecified organism [A41.9]  Yes    Influenza A [J10.1]  Yes    Acute pain of left knee [M25.562]  Yes    Type 2 diabetes mellitus with hyperglycemia, with long-term current use of insulin [E11.65, Z79.4]  Not Applicable    CKD (chronic kidney disease) stage 3, GFR 30-59 ml/min [N18.30]  Yes    Acute respiratory failure with hypoxia [J96.01]  Yes      Resolved Hospital Problems   No resolved problems to display.       Assessment & Plan    1.  Acute respiratory failure with hypoxia, bacterial pneumonia, recent history of flu infection, superimposed bacterial pneumonia. Treated with antibiotic including Rocephin and doxycycline.  No further need of antibiotics for pneumonia but she remains on these for possible septic arthritis of the knee as outlined below.     2.  Acute pain in the left knee, S/P OR for debridement and washout.  ID recommends a 4-week course of IV antibiotics.  Patient now agreeable to IV antibiotics.  Continue prednisone taper.       3.  Acute on chronic kidney injury/hyponatremia.  Management as per nephrology.  Renal  function stable and hyponatremia resolved.     4.  Diabetes mellitus.  Blood sugars reasonably controlled this morning.  Continue present insulin regimen.     5.  Transaminitis.  Gastroenterology suspected muscle related injury.  LFTs trending down     6.  CODE STATUS is full code.       Rehab facility has been found.  Awaiting insurance.  Hopeful that insurance approval will come through tomorrow.  I will go ahead and put an order for midline.      Expected Discharge Date: 2/22/2025; Expected Discharge Time:      Silvestre Pierre MD  [facility]  02/23/25  09:33 EST

## 2025-02-24 LAB
GLUCOSE BLDC GLUCOMTR-MCNC: 111 MG/DL (ref 70–130)
GLUCOSE BLDC GLUCOMTR-MCNC: 244 MG/DL (ref 70–130)
GLUCOSE BLDC GLUCOMTR-MCNC: 285 MG/DL (ref 70–130)
GLUCOSE BLDC GLUCOMTR-MCNC: 296 MG/DL (ref 70–130)

## 2025-02-24 PROCEDURE — 94799 UNLISTED PULMONARY SVC/PX: CPT

## 2025-02-24 PROCEDURE — 94664 DEMO&/EVAL PT USE INHALER: CPT

## 2025-02-24 PROCEDURE — 63710000001 INSULIN GLARGINE PER 5 UNITS: Performed by: INTERNAL MEDICINE

## 2025-02-24 PROCEDURE — 63710000001 INSULIN LISPRO (HUMAN) PER 5 UNITS: Performed by: INTERNAL MEDICINE

## 2025-02-24 PROCEDURE — 25010000002 CEFTRIAXONE PER 250 MG: Performed by: INTERNAL MEDICINE

## 2025-02-24 PROCEDURE — 82948 REAGENT STRIP/BLOOD GLUCOSE: CPT

## 2025-02-24 PROCEDURE — 63710000001 PREDNISONE PER 5 MG: Performed by: INTERNAL MEDICINE

## 2025-02-24 PROCEDURE — 94760 N-INVAS EAR/PLS OXIMETRY 1: CPT

## 2025-02-24 PROCEDURE — 94761 N-INVAS EAR/PLS OXIMETRY MLT: CPT

## 2025-02-24 RX ADMIN — Medication 10 ML: at 09:19

## 2025-02-24 RX ADMIN — IPRATROPIUM BROMIDE AND ALBUTEROL SULFATE 3 ML: 2.5; .5 SOLUTION RESPIRATORY (INHALATION) at 20:50

## 2025-02-24 RX ADMIN — INSULIN LISPRO 8 UNITS: 100 INJECTION, SOLUTION INTRAVENOUS; SUBCUTANEOUS at 21:00

## 2025-02-24 RX ADMIN — IPRATROPIUM BROMIDE AND ALBUTEROL SULFATE 3 ML: 2.5; .5 SOLUTION RESPIRATORY (INHALATION) at 11:04

## 2025-02-24 RX ADMIN — CARVEDILOL 12.5 MG: 12.5 TABLET, FILM COATED ORAL at 16:38

## 2025-02-24 RX ADMIN — INSULIN LISPRO 8 UNITS: 100 INJECTION, SOLUTION INTRAVENOUS; SUBCUTANEOUS at 16:38

## 2025-02-24 RX ADMIN — PREDNISONE 10 MG: 10 TABLET ORAL at 09:18

## 2025-02-24 RX ADMIN — CARVEDILOL 12.5 MG: 12.5 TABLET, FILM COATED ORAL at 09:19

## 2025-02-24 RX ADMIN — EMPAGLIFLOZIN 25 MG: 25 TABLET, FILM COATED ORAL at 09:18

## 2025-02-24 RX ADMIN — CEFTRIAXONE 2000 MG: 2 INJECTION, POWDER, FOR SOLUTION INTRAMUSCULAR; INTRAVENOUS at 09:18

## 2025-02-24 RX ADMIN — FUROSEMIDE 20 MG: 20 TABLET ORAL at 09:18

## 2025-02-24 RX ADMIN — Medication 10 ML: at 21:01

## 2025-02-24 RX ADMIN — GLIPIZIDE 5 MG: 5 TABLET ORAL at 09:18

## 2025-02-24 RX ADMIN — ASPIRIN 81 MG: 81 TABLET, COATED ORAL at 21:01

## 2025-02-24 RX ADMIN — OXYCODONE HYDROCHLORIDE 5 MG: 5 TABLET ORAL at 15:46

## 2025-02-24 RX ADMIN — ASPIRIN 81 MG: 81 TABLET, COATED ORAL at 09:19

## 2025-02-24 RX ADMIN — IPRATROPIUM BROMIDE AND ALBUTEROL SULFATE 3 ML: 2.5; .5 SOLUTION RESPIRATORY (INHALATION) at 06:36

## 2025-02-24 RX ADMIN — INSULIN GLARGINE 10 UNITS: 100 INJECTION, SOLUTION SUBCUTANEOUS at 21:00

## 2025-02-24 RX ADMIN — INSULIN LISPRO 5 UNITS: 100 INJECTION, SOLUTION INTRAVENOUS; SUBCUTANEOUS at 11:57

## 2025-02-24 NOTE — PLAN OF CARE
Goal Outcome Evaluation:  Plan of Care Reviewed With: patient        Progress: improving  Outcome Evaluation: vss. telemetry  monitor, sr. alrt and oriented x4, room air. up with assist, falls precautions. midline to be placed prior to dc, iv nurse notified. fluid restriction lifted per md orders

## 2025-02-24 NOTE — PROGRESS NOTES
"   LOS: 14 days     Chief Complaint/ Reason for encounter: CKD management    Subjective   02/14/25 : Patient is doing well today with no new complaints  Good appetite with no nausea or vomiting  No shortness of breath chest pain or edema  Voiding well with no dysuria  Denies new complaints    2/20: No new complaints, just waiting on rehab placement at this time  Needs IV access for outpatient antibiotics    2/24: She is doing well today denies complaints awaiting rehab placement  No shortness of breath or chest pain no fevers chills or edema    Medical history reviewed:  History of Present Illness    Subjective    History taken from: Chart and patient/family as able    Vital Signs  Temp:  [97.2 °F (36.2 °C)-97.5 °F (36.4 °C)] 97.2 °F (36.2 °C)  Heart Rate:  [73-95] 95  Resp:  [17-18] 18  BP: (111-141)/(75-86) 141/86       Wt Readings from Last 1 Encounters:   02/10/25 0330 92.5 kg (204 lb)       Objective:  Vital signs: (most recent): Blood pressure 141/86, pulse 95, temperature 97.2 °F (36.2 °C), temperature source Oral, resp. rate 18, height 170.2 cm (67\"), weight 92.5 kg (204 lb), SpO2 92%.                Objective:  General Appearance:  Comfortable, obese, well-appearing, in no acute distress and not in pain.  HEENT: Mucous membranes moist  Lungs:  Normal effort and normal respiratory rate.  Breath sounds clear to auscultation: No rhonchi/Rales.  No  respiratory distress.   Heart:  S1, S2 normal.  No murmur.   Abdomen: Abdomen is soft, nontender/nondistended, + bowel sounds  Extremities: Trace edema of bilateral lower extremities  Skin:  Warm and dry with no rashes      Results Review:    Intake/Output:     Intake/Output Summary (Last 24 hours) at 2/24/2025 1351  Last data filed at 2/24/2025 1130  Gross per 24 hour   Intake 960 ml   Output --   Net 960 ml         DATA:  Radiology and Labs:  The following labs independently reviewed by me. Additional labs ordered for tomorrow a.m.  Interval notes, chart " personally reviewed by me.   Old records independently reviewed showing CKD 3  Discussed with patient    Risk/ complexity of medical care/ medical decision making moderate, diuretic management with CKD      Labs:   Recent Results (from the past 24 hours)   POC Glucose Once    Collection Time: 02/23/25  4:44 PM    Specimen: Blood   Result Value Ref Range    Glucose 269 (H) 70 - 130 mg/dL   POC Glucose Once    Collection Time: 02/23/25  9:53 PM    Specimen: Blood   Result Value Ref Range    Glucose 295 (H) 70 - 130 mg/dL   POC Glucose Once    Collection Time: 02/24/25  7:55 AM    Specimen: Blood   Result Value Ref Range    Glucose 111 70 - 130 mg/dL   POC Glucose Once    Collection Time: 02/24/25 11:37 AM    Specimen: Blood   Result Value Ref Range    Glucose 244 (H) 70 - 130 mg/dL       Radiology:  Pertinent radiology studies were reviewed as described above      Medications have been reviewed separately in chart review medication tab      ASSESSMENT:  CKD stage IIIa, stable, near baseline    Bacterial pneumonia, remains on IV/p.o. antibiotics    Sepsis, unspecified organism, improving with antibiotic therapy    Influenza A  Chronic diastolic CHF, on oral diuretics     Type 2 diabetes mellitus with hyperglycemia, with long-term current use of insulin    Acute respiratory failure with hypoxia, improved, on room air           DISCUSSION/PLAN:   Creatinine level remains very stable today, below usual baseline creatinine of around 1.4-1.8  Clinically euvolemic on exam  Remains euvolemic on current oral Lasix dose: Continue 20 mg daily dose  Antibiotics per infectious disease, dosed for GFR around 50  Acidosis improved off sodium bicarb  Continue Jardiance  Discharge planning      Antwan Vieira MD  [facility]   Office phone number: [phone]  Answering service phone number: [phone]    02/24/25  13:51 EST    Dictation performed using Dragon dictation software

## 2025-02-24 NOTE — NURSING NOTE
Iv team consulted to place a midline for discharge iv abx. Chart reviewed -hx of CKD3 and gfr of 46.7. Dr Patel nephrologist approved line to be placed immediately before discharge and removal of line promptly at completion of iv abx. Spoke with Shannan ROSAS - no consent, behavorial contract, or education has been completed she will notify iv team if pt is to be discharged today and once consents have been obtained.

## 2025-02-24 NOTE — CASE MANAGEMENT/SOCIAL WORK
Continued Stay Note  [facility]     Patient Name: Chrissy Gutierrez  MRN: [id number]  Today's Date: 2/24/2025    Admit Date: 2/10/2025    Plan: [facility]- pre-cert obtained   Discharge Plan       Row Name 02/24/25 1458       Plan    Plan [facility]- pre-cert obtained    Patient/Family in Agreement with Plan yes    Plan Comments Pre-cert has been obtained. Patient updated and agreeable. Nurse aware and will notify IV therapy so that midline can be placed. Anticipate dc tomorrow.                   Discharge Codes    No documentation.                 Expected Discharge Date and Time       Expected Discharge Date Expected Discharge Time    Feb 25, 2025               Elida Hayes RN

## 2025-02-24 NOTE — CASE MANAGEMENT/SOCIAL WORK
Post-Acute Authorization Submission      Post Acute Pre-Cert Documentation  Request Submitted by Facility - Type:: Hospital  Post-Acute Authorization Type Submitted:: SNF  Date Post Acute Pre-Cert Inititated per Facility: 02/21/25  Date Post Acute Pre-Cert Completed: 02/24/25  Accepting Facility: [facility] Discharge Date Requested: 02/22/25  All Clinicals Submitted?: Yes  Had Accepting Facility at Time of Submission: Yes  Response Received from Insurance?: Approval  Response Communicated to:: , Accepting Facility Liaison, Accepting Facility Auth Department  Authorization Number:: APPROVED [id number]  Post Acute Pre-Cert Initiated Comment: VALID TO ADMIT UPTO 2/26/25.              Shaggy Fenton, PCT

## 2025-02-24 NOTE — PLAN OF CARE
Problem: Adult Inpatient Plan of Care  Goal: Absence of Hospital-Acquired Illness or Injury  Intervention: Identify and Manage Fall Risk  Recent Flowsheet Documentation  Taken 2/24/2025 0209 by Johnny Amaro, RN  Safety Promotion/Fall Prevention:   activity supervised   assistive device/personal items within reach   clutter free environment maintained   safety round/check completed   nonskid shoes/slippers when out of bed  Taken 2/23/2025 2038 by Johnny Amaro, RN  Safety Promotion/Fall Prevention:   activity supervised   assistive device/personal items within reach   clutter free environment maintained   safety round/check completed   nonskid shoes/slippers when out of bed   Goal Outcome Evaluation:

## 2025-02-24 NOTE — PROGRESS NOTES
" LOS: 14 days     Name: Chrissy Gutierrez  Age: 74 y.o.  Sex: female  :  1950  MRN: [id number]         Primary Care Physician: Jai Steven MD    Subjective   Subjective  No new complaints or acute overnight events.    Objective   Vital Signs  Temp:  [95 °F (35 °C)-97.5 °F (36.4 °C)] 97.2 °F (36.2 °C)  Heart Rate:  [] 73  Resp:  [17-22] 18  BP: (111-149)/(75-90) 141/86  Body mass index is 31.95 kg/m².    Objective:  General Appearance:  Comfortable and in no acute distress.    Vital signs: (most recent): Blood pressure 141/86, pulse 73, temperature 97.2 °F (36.2 °C), temperature source Oral, resp. rate 18, height 170.2 cm (67\"), weight 92.5 kg (204 lb), SpO2 95%.    Lungs:  Normal effort and normal respiratory rate.  She is not in respiratory distress.  No decreased breath sounds.    Heart: Normal rate.  Regular rhythm.    Abdomen: Abdomen is soft.  Bowel sounds are normal.   There is no abdominal tenderness.     Extremities: There is no dependent edema or local swelling.    Neurological: Patient is alert and oriented to person, place and time.    Skin:  Warm and dry.                Results Review:       I reviewed the patient's new clinical results.    Results from last 7 days   Lab Units 25  0344 25  0442 25  0631   WBC 10*3/mm3 13.56* 13.74* 13.47*   HEMOGLOBIN g/dL 11.2* 11.2* 11.1*   PLATELETS 10*3/mm3 545* 601* 577*     Results from last 7 days   Lab Units 25  0815 25  0344 25  1418 25  0442 25  0631   SODIUM mmol/L 138 128* 134* 138 138   POTASSIUM mmol/L 3.7 3.8 4.4 4.5 4.6   CHLORIDE mmol/L 100 92* 94* 100 100   CO2 mmol/L 24.6 22.0 23.1 24.9 23.1   BUN mg/dL 55* 64* 60* 74* 71*   CREATININE mg/dL 1.22* 1.24* 1.45* 1.53* 1.37*   CALCIUM mg/dL 9.0 8.9 8.8 9.5 9.2   GLUCOSE mg/dL 108* 135* 331* 158* 188*                 Scheduled Meds:   aspirin, 81 mg, Oral, BID  carvedilol, 12.5 mg, Oral, BID With Meals  cefTRIAXone, 2,000 mg, Intravenous, " Q24H  empagliflozin, 25 mg, Oral, Daily  furosemide, 20 mg, Oral, Daily  glipizide, 5 mg, Oral, QAM AC  insulin glargine, 10 Units, Subcutaneous, Nightly  insulin lispro, 3-14 Units, Subcutaneous, 4x Daily AC & at Bedtime  ipratropium-albuterol, 3 mL, Nebulization, Q6H While Awake - RT  predniSONE, 10 mg, Oral, Daily With Breakfast  sodium chloride, 10 mL, Intravenous, Q12H      PRN Meds:     senna-docusate sodium **AND** polyethylene glycol **AND** bisacodyl **AND** bisacodyl    calcium carbonate    dextrose    dextrose    glucagon (human recombinant)    ipratropium-albuterol    methocarbamol    nitroglycerin    ondansetron ODT **OR** ondansetron    oxyCODONE    sodium chloride    sodium chloride    sodium chloride  Continuous Infusions:       Assessment & Plan   Active Hospital Problems    Diagnosis  POA    **Bacterial pneumonia [J15.9]  Yes    Sepsis, unspecified organism [A41.9]  Yes    Influenza A [J10.1]  Yes    Acute pain of left knee [M25.562]  Yes    Type 2 diabetes mellitus with hyperglycemia, with long-term current use of insulin [E11.65, Z79.4]  Not Applicable    CKD (chronic kidney disease) stage 3, GFR 30-59 ml/min [N18.30]  Yes    Acute respiratory failure with hypoxia [J96.01]  Yes      Resolved Hospital Problems   No resolved problems to display.       Assessment & Plan  1.  Acute respiratory failure with hypoxia, bacterial pneumonia, recent history of flu infection, superimposed bacterial pneumonia. Treated with antibiotic including Rocephin and doxycycline.  No further need of antibiotics for pneumonia but she remains on these for possible septic arthritis of the knee as outlined below.     2.  Acute pain in the left knee, S/P OR for debridement and washout.  ID recommends a 4-week course of IV antibiotics.  Patient now agreeable to IV antibiotics.  Continue prednisone taper.  This will be completed tomorrow.     3.  Acute on chronic kidney injury/hyponatremia.  Management as per nephrology.   Renal function stable and hyponatremia resolved.     4.  Diabetes mellitus.  Blood sugars reasonably controlled this morning.  Continue present insulin regimen.     5.  Transaminitis.  Gastroenterology suspected muscle related injury.  LFTs trending down     6.  CODE STATUS is full code.       Rehab facility has been found.  Awaiting insurance.  Hopeful that insurance approval will come through today.  Midline will be placed before discharge.      Expected Discharge Date: 2/22/2025; Expected Discharge Time:      Silvestre Pierre MD  [facility]  02/24/25  09:33 EST

## 2025-02-25 VITALS
HEIGHT: 67 IN | HEART RATE: 88 BPM | TEMPERATURE: 97 F | BODY MASS INDEX: 32.02 KG/M2 | RESPIRATION RATE: 18 BRPM | DIASTOLIC BLOOD PRESSURE: 75 MMHG | SYSTOLIC BLOOD PRESSURE: 112 MMHG | WEIGHT: 204 LBS | OXYGEN SATURATION: 97 %

## 2025-02-25 LAB
GLUCOSE BLDC GLUCOMTR-MCNC: 103 MG/DL (ref 70–130)
GLUCOSE BLDC GLUCOMTR-MCNC: 208 MG/DL (ref 70–130)

## 2025-02-25 PROCEDURE — 94664 DEMO&/EVAL PT USE INHALER: CPT

## 2025-02-25 PROCEDURE — 97530 THERAPEUTIC ACTIVITIES: CPT

## 2025-02-25 PROCEDURE — 63710000001 INSULIN LISPRO (HUMAN) PER 5 UNITS: Performed by: INTERNAL MEDICINE

## 2025-02-25 PROCEDURE — 63710000001 PREDNISONE PER 5 MG: Performed by: INTERNAL MEDICINE

## 2025-02-25 PROCEDURE — 82948 REAGENT STRIP/BLOOD GLUCOSE: CPT

## 2025-02-25 PROCEDURE — 94799 UNLISTED PULMONARY SVC/PX: CPT

## 2025-02-25 PROCEDURE — 36410 VNPNXR 3YR/> PHY/QHP DX/THER: CPT

## 2025-02-25 PROCEDURE — 25010000002 CEFTRIAXONE PER 250 MG: Performed by: INTERNAL MEDICINE

## 2025-02-25 PROCEDURE — C1751 CATH, INF, PER/CENT/MIDLINE: HCPCS

## 2025-02-25 RX ORDER — SODIUM CHLORIDE 0.9 % (FLUSH) 0.9 %
10 SYRINGE (ML) INJECTION AS NEEDED
Status: DISCONTINUED | OUTPATIENT
Start: 2025-02-25 | End: 2025-02-25 | Stop reason: HOSPADM

## 2025-02-25 RX ORDER — ACETAMINOPHEN 325 MG/1
650 TABLET ORAL EVERY 6 HOURS PRN
Status: DISCONTINUED | OUTPATIENT
Start: 2025-02-25 | End: 2025-02-25 | Stop reason: HOSPADM

## 2025-02-25 RX ORDER — CARVEDILOL 12.5 MG/1
12.5 TABLET ORAL 2 TIMES DAILY WITH MEALS
Start: 2025-02-25

## 2025-02-25 RX ORDER — SODIUM CHLORIDE 0.9 % (FLUSH) 0.9 %
10 SYRINGE (ML) INJECTION EVERY 12 HOURS SCHEDULED
Status: DISCONTINUED | OUTPATIENT
Start: 2025-02-25 | End: 2025-02-25 | Stop reason: HOSPADM

## 2025-02-25 RX ORDER — ACETAMINOPHEN 325 MG/1
650 TABLET ORAL EVERY 6 HOURS PRN
Start: 2025-02-25

## 2025-02-25 RX ADMIN — Medication 10 ML: at 08:57

## 2025-02-25 RX ADMIN — IPRATROPIUM BROMIDE AND ALBUTEROL SULFATE 3 ML: 2.5; .5 SOLUTION RESPIRATORY (INHALATION) at 07:01

## 2025-02-25 RX ADMIN — IPRATROPIUM BROMIDE AND ALBUTEROL SULFATE 3 ML: 2.5; .5 SOLUTION RESPIRATORY (INHALATION) at 12:04

## 2025-02-25 RX ADMIN — Medication 10 ML: at 12:31

## 2025-02-25 RX ADMIN — ASPIRIN 81 MG: 81 TABLET, COATED ORAL at 08:56

## 2025-02-25 RX ADMIN — FUROSEMIDE 20 MG: 20 TABLET ORAL at 08:56

## 2025-02-25 RX ADMIN — Medication 10 ML: at 12:30

## 2025-02-25 RX ADMIN — CEFTRIAXONE 2000 MG: 2 INJECTION, POWDER, FOR SOLUTION INTRAMUSCULAR; INTRAVENOUS at 08:57

## 2025-02-25 RX ADMIN — ACETAMINOPHEN 650 MG: 325 TABLET, FILM COATED ORAL at 10:21

## 2025-02-25 RX ADMIN — GLIPIZIDE 5 MG: 5 TABLET ORAL at 08:56

## 2025-02-25 RX ADMIN — PREDNISONE 10 MG: 10 TABLET ORAL at 08:56

## 2025-02-25 RX ADMIN — EMPAGLIFLOZIN 25 MG: 25 TABLET, FILM COATED ORAL at 08:56

## 2025-02-25 RX ADMIN — CARVEDILOL 12.5 MG: 12.5 TABLET, FILM COATED ORAL at 08:56

## 2025-02-25 RX ADMIN — INSULIN LISPRO 5 UNITS: 100 INJECTION, SOLUTION INTRAVENOUS; SUBCUTANEOUS at 12:30

## 2025-02-25 NOTE — PROGRESS NOTES
"  Infectious Diseases Progress Note    Roxanne Duncan MD     [facility]  Los: 14 days  Patient Identification:  Name: Chrissy Gutierrez  Age: 74 y.o.  Sex: female  :  1950  MRN: [id number]         Primary Care Physician: Jai Steven MD        Subjective: Continues to feel well and progressing well.  Excited about being discharged tomorrow as her pre-CERT is approved.  Interval History: See consultation note.    Objective:    Scheduled Meds:aspirin, 81 mg, Oral, BID  carvedilol, 12.5 mg, Oral, BID With Meals  cefTRIAXone, 2,000 mg, Intravenous, Q24H  empagliflozin, 25 mg, Oral, Daily  furosemide, 20 mg, Oral, Daily  glipizide, 5 mg, Oral, QAM AC  insulin glargine, 10 Units, Subcutaneous, Nightly  insulin lispro, 3-14 Units, Subcutaneous, 4x Daily AC & at Bedtime  ipratropium-albuterol, 3 mL, Nebulization, Q6H While Awake - RT  predniSONE, 10 mg, Oral, Daily With Breakfast  sodium chloride, 10 mL, Intravenous, Q12H      Continuous Infusions:     Vital signs in last 24 hours:  Temp:  [97.1 °F (36.2 °C)-98.1 °F (36.7 °C)] 97.1 °F (36.2 °C)  Heart Rate:  [73-95] 95  Resp:  [17-18] 18  BP: (111-141)/(71-86) 129/71    Intake/Output:    Intake/Output Summary (Last 24 hours) at 2025  Last data filed at 2025 1811  Gross per 24 hour   Intake 1200 ml   Output --   Net 1200 ml       Exam:  /71 (BP Location: Right arm, Patient Position: Sitting)   Pulse 95   Temp 97.1 °F (36.2 °C) (Oral)   Resp 18   Ht 170.2 cm (67\")   Wt 92.5 kg (204 lb)   SpO2 92%   BMI 31.95 kg/m²   Patient is examined using the personal protective equipment as per guidelines from infection control for this particular patient as enacted.  Hand washing was performed before and after patient interaction.  General Appearance:    Alert, cooperative, no distress, AAOx3                          Head:    Normocephalic, without obvious abnormality, atraumatic                           Eyes:    PERRL, " conjunctivae/corneas clear, EOM's intact, both eyes                         Throat:   Lips, tongue, gums normal; oral mucosa pink and moist                           Neck:   Supple, symmetrical, trachea midline, no JVD                         Lungs:    Clear to auscultation bilaterally, respirations unlabored                 Chest Wall:    No tenderness or deformity                          Heart:  S1-S2 regular                  Abdomen:   Soft nontender                 Extremities: Left knee and lower extremities wrapped in the Ace wrap.                        Pulses:   Pulses palpable in all extremities                            Skin:   Skin is warm and dry,  no rashes or palpable lesions                  Neurologic: Alert and oriented x 3       Data Review:    I reviewed the patient's new clinical results.  Results from last 7 days   Lab Units 02/22/25  0344 02/19/25  0442 02/18/25  0631   WBC 10*3/mm3 13.56* 13.74* 13.47*   HEMOGLOBIN g/dL 11.2* 11.2* 11.1*   PLATELETS 10*3/mm3 545* 601* 577*     Results from last 7 days   Lab Units 02/23/25  0815 02/22/25  0344 02/21/25  1418 02/19/25  0442 02/18/25  0631   SODIUM mmol/L 138 128* 134* 138 138   POTASSIUM mmol/L 3.7 3.8 4.4 4.5 4.6   CHLORIDE mmol/L 100 92* 94* 100 100   CO2 mmol/L 24.6 22.0 23.1 24.9 23.1   BUN mg/dL 55* 64* 60* 74* 71*   CREATININE mg/dL 1.22* 1.24* 1.45* 1.53* 1.37*   CALCIUM mg/dL 9.0 8.9 8.8 9.5 9.2   GLUCOSE mg/dL 108* 135* 331* 158* 188*     Microbiology Results (last 10 days)       ** No results found for the last 240 hours. **              Assessment:    Bacterial pneumonia    Sepsis, unspecified organism    Influenza A    Acute pain of left knee    Type 2 diabetes mellitus with hyperglycemia, with long-term current use of insulin    CKD (chronic kidney disease) stage 3, GFR 30-59 ml/min    Acute respiratory failure with hypoxia    74-year-old female with  1-painful tender left knee with decline in function status post joint aspiration  followed by I&D and washout in the setting of respiratory tract infection with influenza A and abnormal urinalysis consistent with UTI-this presentation of tender painful knee with worsening function and abnormal joint fluid analysis with crystal for uric acid positive could very well be due to combination of multiple pathologies happening together:             -Acute gouty arthritis with             -Secondary septic arthritis due to hematogenous seeding from the lung or urine versus             -Progression of osteoarthritis.  2-acute influenza A with secondary bacterial pneumonia  3-abnormal urinalysis with urine culture without any specific urinary symptoms  4-acute on chronic renal insufficiency with prior history of left-sided hydronephrosis and history of right nephrectomy  5-osteoarthritis of the right shoulder and wrist without any fracture  6-hypertension  7-peripheral neuropathy  8-history of renal cell carcinoma-history of right nephrectomy  9-type 2 diabetes.     Recommendations/Discussions:  See my discussion and recommendations on 2/13/2025 and 2/14/2025.  Management of concomitant gout with short course of oral steroid per primary team and orthopedic surgery service.  Patient will need out of hospital follow-up with orthopedic surgery service as per their recommendations  See discussion in the additional progress note on 2/14/2025 for alternate oral antibiotic therapy as a second line option which is better than taking no treatment as she is still very reluctant for IV treatment as recommended:  Patient is very concerned about her ability to get IV antibiotics and was hoping that she would get oral antibiotics for her infection.  I explained the patient about ideal treatment for this situation as recommended in my progress note and orders for the case management.  If patient after being explained the risks and benefits of her decision making after using oral antibiotics over IV antibiotics makes the  decision to go with oral antibiotics then she could be switched to oral Keflex 500 mg every 8 hours for 28 days from 2/11/2025.  With ongoing continued management of gout  Patient will need close follow-up with the primary care provider with once weekly lab work to monitor antibiotic toxicity and side effects with instructions to reach out to Dr. Duncan on a once a week basis to go over review of system to monitor antibiotic toxicity and side effects and have a primary care provider reach out to me if there is need for discussion of further management of her left knee infection.  Ideal treatment for this presentation is discussed will be 4 weeks of IV Rocephin from last surgical intervention on 2/11/2025.  Following orders are written for case management:   Please arrange for 4 weeks of IV Rocephin at 2 g 24 hours starting 2/11/2025.  Check weekly CBC CMP and CRP and call abnormal results at [phone].  Remove midline/PICC line after completion of antibiotic therapy  Please educate patient to reach out to Dr. Duncan on once a week basis at [phone] to go over review of system to monitor antibiotic toxicity and effectiveness of treatment.  Diagnosis:  1-painful tender left knee with decline in function status post joint aspiration followed by I&D and washout in the setting of respiratory tract infection with influenza A and abnormal urinalysis consistent with UTI-this presentation of tender painful knee with worsening function and abnormal joint fluid analysis with crystal for uric acid positive could very well be due to combination of multiple pathologies happening together:             -Acute gouty arthritis with             -Secondary septic arthritis due to hematogenous seeding from the lung or urine versus             -Progression of osteoarthritis.  2-acute influenza A with secondary bacterial pneumonia  3-abnormal urinalysis with urine culture without any specific urinary symptoms  Roxanne Duncan  MD  2/24/2025  19:48 EST    Parts of this note may be an electronic transcription/translation of spoken language to printed text using the Dragon dictation system.

## 2025-02-25 NOTE — THERAPY TREATMENT NOTE
Patient Name: Chrissy Gutierrez  : 1950    MRN: [id number]                              Today's Date: 2025       Admit Date: 2/10/2025    Visit Dx:     ICD-10-CM ICD-9-CM   1. Acute respiratory failure with hypoxia  J96.01 518.81   2. Pneumonia of right lower lobe due to infectious organism  J18.9 486   3. Acute pain of both knees  M25.561 338.19    M25.562 719.46   4. Generalized weakness  R53.1 780.79   5. SONI (acute kidney injury)  N17.9 584.9   6. Acute UTI  N39.0 599.0   7. Septic arthritis of knee, left  M00.9 711.06   8. Follow-up exam  Z09 V67.9     Patient Active Problem List   Diagnosis    Right lower lobe pneumonia    Sepsis, unspecified organism    Influenza A    Acute pain of left knee    Bacterial pneumonia    Type 2 diabetes mellitus with hyperglycemia, with long-term current use of insulin    CKD (chronic kidney disease) stage 3, GFR 30-59 ml/min    Acute respiratory failure with hypoxia     Past Medical History:   Diagnosis Date    Cancer     right kidney    Chronic kidney disease     Hypertension     Low back pain     Osteoarthritis     Peripheral neuropathy      Past Surgical History:   Procedure Laterality Date    BACK SURGERY      EPIDURAL BLOCK      KIDNEY SURGERY Right 2017    REMOVED RIGHT KIDNEY    KNEE INCISION AND DRAINAGE Left 2025    Procedure: KNEE INCISION AND DRAINAGE;  Surgeon: Arvind Ibarra MD;  Location: [facility];  Service: Orthopedics;  Laterality: Left;    LUMBAR DISCECTOMY FUSION INSTRUMENTATION N/A 2017    Procedure: 1 LEVEL LAMINECTOMY DECOMPRESSION L4 5 EXCISION FACET CYST;  Surgeon: Negrito Oconnell DO;  Location: [facility];  Service:     TUBAL ABDOMINAL LIGATION      WISDOM TOOTH EXTRACTION        General Information       Row Name 25 0806          Physical Therapy Time and Intention    Document Type therapy note (daily note)  -MS     Mode of Treatment physical therapy;individual therapy  -MS       Row Name 25 0806           General Information    Patient Profile Reviewed yes  -MS     Existing Precautions/Restrictions fall   Exit alarm  -MS     Barriers to Rehab none identified  -MS       Row Name 02/25/25 0806          Cognition    Orientation Status (Cognition) oriented x 3  -MS       Row Name 02/25/25 0806          Safety Issues/Impairments Affecting Functional Mobility    Comment, Safety Issues/Impairments (Mobility) Gait belt used for safety.  -MS               User Key  (r) = Recorded By, (t) = Taken By, (c) = Cosigned By      Initials Name Provider Type    Jed Savage, PT Physical Therapist                   Mobility       Row Name 02/25/25 0806          Bed Mobility    Supine-Sit [place] (Bed Mobility) contact guard  -MS     Sit-Supine [place] (Bed Mobility) contact guard  -MS       Row Name 02/25/25 0806          Sit-Stand Transfer    Sit-Stand [place] (Transfers) contact guard  -MS     Assistive Device (Sit-Stand Transfers) walker, front-wheeled  -MS       Row Name 02/25/25 0806          Gait/Stairs (Locomotion)    [place] Level (Gait) contact guard  -MS     Assistive Device (Gait) walker, front-wheeled  -MS     Distance in Feet (Gait) 20  -MS     Deviations/Abnormal Patterns (Gait) trinh decreased  -MS     Bilateral Gait Deviations forward flexed posture  -MS     Comment, (Gait/Stairs) Tactile cues for posture correction.  -MS       Row Name 02/25/25 0806          Mobility    Left Lower Extremity (Weight-bearing Status) weight-bearing as tolerated (WBAT)  -MS               User Key  (r) = Recorded By, (t) = Taken By, (c) = Cosigned By      Initials Name Provider Type    Jed Savage PT Physical Therapist                   Obj/Interventions       Row Name 02/25/25 0807          Motor Skills    Therapeutic Exercise --  Left TKR ther. ex. program x 10 reps completed  -MS               User Key  (r) = Recorded By, (t) = Taken By, (c) = Cosigned By      Initials Name Provider Type    MS  Jed Mark, PT Physical Therapist                   Goals/Plan    No documentation.                  Clinical Impression       Row Name 02/25/25 0807          Pain    Pretreatment Pain Rating 3/10  -MS     Posttreatment Pain Rating 3/10  -MS     Pain Location knee  -MS     Pain Side/Orientation left  -MS       Row Name 02/25/25 0807          Positioning and Restraints    Pre-Treatment Position in bed  -MS     Post Treatment Position bed  -MS     In Bed notified nsg;supine;encouraged to call for assist;call light within reach;exit alarm on  -MS               User Key  (r) = Recorded By, (t) = Taken By, (c) = Cosigned By      Initials Name Provider Type    Jed Savage, PT Physical Therapist                   Outcome Measures       Row Name 02/25/25 0808          How much help from another person do you currently need...    Turning from your back to your side while in flat bed without using bedrails? 3  -MS     Moving from lying on back to sitting on the side of a flat bed without bedrails? 3  -MS     Moving to and from a bed to a chair (including a wheelchair)? 3  -MS     Standing up from a chair using your arms (e.g., wheelchair, bedside chair)? 3  -MS     Climbing 3-5 steps with a railing? 3  -MS     To walk in hospital room? 3  -MS     AM-PAC 6 Clicks Score (PT) 18  -MS     Highest Level of Mobility Goal 6 --> Walk 10 steps or more  -MS       Row Name 02/25/25 0808          Functional Assessment    Outcome Measure Options AM-PAC 6 Clicks Basic Mobility (PT)  -MS               User Key  (r) = Recorded By, (t) = Taken By, (c) = Cosigned By      Initials Name Provider Type    Jed Savage, PT Physical Therapist                                 Physical Therapy Education       Title: PT OT SLP Therapies (Done)       Topic: Physical Therapy (Done)       Point: Mobility training (Done)       Learning Progress Summary            Patient Acceptance, E,D, VU,NR by MS at 2/25/2025 0808    Acceptance,  E, VU,NR by CN at 2/22/2025 1601    Acceptance, E, DU by ZT at 2/21/2025 1015    Acceptance, E, DU by ZT at 2/20/2025 1419    Acceptance, E,D, VU,NR by MS at 2/15/2025 1635    Acceptance, E, NR by CS at 2/11/2025 1323                      Point: Home exercise program (Done)       Learning Progress Summary            Patient Acceptance, E,D, VU,NR by MS at 2/25/2025 0808    Acceptance, E, VU,NR by CN at 2/22/2025 1601    Acceptance, E, DU by ZT at 2/21/2025 1015    Acceptance, E, DU by ZT at 2/20/2025 1419    Acceptance, E, NR by CS at 2/11/2025 1323                                      User Key       Initials Effective Dates Name Provider Type Discipline    MS 06/16/21 -  Jed Mark, PT Physical Therapist PT    CN 06/16/21 -  Tiffany Hughes, PT Physical Therapist PT    CS 09/06/24 -  Shen Gutierrez, PT Physical Therapist PT    ZT 01/29/25 -  Roderick Casey, PT Student PT Student PT                  PT Recommendation and Plan     Outcome Evaluation: Upon entering room, pt. supine in bed, awake/alert, and agreeable to work with P.T. this date despite c/o fatigue and Left knee pain (3/10).  This AM, pt. able to ambulate 20 feet, CGA x 1, with use of Rwx.  Pt. requires CGA x 1 for bed mobility and CGA x 1 for sit <-> stand transfers. Left TKR ther. ex. program x 10 reps completed for general strengthening. Tactile cueing given during ambulation for posture correction.  Will continue to progress functional mobility as tolerated.     Time Calculation:         PT Charges       Row Name 02/25/25 0811             Time Calculation    Start Time 0745  -MS      Stop Time 0800  -MS      Time Calculation (min) 15 min  -MS      PT Received On 02/25/25  -MS      PT - Next Appointment 02/26/25  -MS         Time Calculation- PT    Total Timed Code Minutes- PT 14 minute(s)  -MS                User Key  (r) = Recorded By, (t) = Taken By, (c) = Cosigned By      Initials Name Provider Type    MS Jed Mark, PT  Physical Therapist                  Therapy Charges for Today       Code Description Service Date Service Provider Modifiers Qty    [id number]  PT THERAPEUTIC ACT EA 15 MIN 2/25/2025 Jed Mark, PT GP 1            PT G-Codes  Outcome Measure Options: AM-PAC 6 Clicks Basic Mobility (PT)  AM-PAC 6 Clicks Score (PT): 18       Jed Mark, PT  2/25/2025

## 2025-02-25 NOTE — SIGNIFICANT NOTE
"   02/25/25 1155   Midline Catheter - Single Lumen 02/25/25 Right Basilic   Placement date: If unknown, DO NOT use \"Add Comment\" note/Placement time: If unknown, DO NOT use \"Add Comment\" note: 02/25/25 1154   Hand Hygiene Completed: Yes  Site Prep: Chlorhexidine isopropyl alcohol  All 5 Sterile Barriers Used (Gloves, Gown, Ca...   Site Assessment Clean;Dry;Intact   Line Status Blood return noted;Capped;Flushed;Saline locked   Length marcial (cm) 17 cm   Extremity Circumference (cm) 36 cm   Dressing Type Border Dressing;Transparent;Securing device;Antimicrobial dressing/disc   Liquid Adhesive Applied   Indication/Daily Review of Necessity long-term IV access >7 days             MIDLINE IS APPROVED FOR USE.            [id number]     EXP 11-  "

## 2025-02-25 NOTE — PLAN OF CARE
Goal Outcome Evaluation:  Plan of Care Reviewed With: patient        Progress: improving  Pt discharging today  2/25/25 at 1400 to [facility] by wheelchair van. Pt had midline placed in right upper arm today to continue the IV antibiotics outpt.

## 2025-02-25 NOTE — CASE MANAGEMENT/SOCIAL WORK
Case Management Discharge Note      Final Note: dc to skilled bed at [facility] via [facility] w/c kin    Provided Post Acute Provider List?: Yes  Post Acute Provider List: Inpatient Rehab  Provided Post Acute Provider Quality & Resource List?: Yes  Post Acute Provider Quality and Resource List: Inpatient Rehab  Delivered To: Support Person, Patient  Support Person: Percy (son)  Method of Delivery: In person    Selected Continued Care - Discharged on 2/25/2025 Admission date: 2/10/2025 - Discharge disposition: Skilled Nursing Facility (DC - External)      Destination Coordination complete.      Service Provider Services Address Phone Fax Patient Preferred    [facility] Skilled Nursing [address] [place] [id number] [phone] --              Durable Medical Equipment    No services have been selected for the patient.                Dialysis/Infusion    No services have been selected for the patient.                Home Medical Care    No services have been selected for the patient.                Therapy    No services have been selected for the patient.                Community Resources    No services have been selected for the patient.                Community & DME    No services have been selected for the patient.                    Transportation Services  W/C Van: [facility] Espinoza    Final Discharge Disposition Code: 03 - skilled nursing facility (SNF)

## 2025-02-25 NOTE — PLAN OF CARE
Goal Outcome Evaluation:   Pt resting well overnight, no issues.  Awaiting midline placement and transfer to SNF later today.

## 2025-02-25 NOTE — PROGRESS NOTES
"  Infectious Diseases Progress Note    Roxanne Duncan MD     [facility]  Los: 15 days  Patient Identification:  Name: Chrissy Gutierrez  Age: 74 y.o.  Sex: female  :  1950  MRN: [id number]         Primary Care Physician: Jai Steven MD        Subjective: Feeling better and ready to be discharged.  Interval History: See consultation note.    Objective:    Scheduled Meds:aspirin, 81 mg, Oral, BID  carvedilol, 12.5 mg, Oral, BID With Meals  cefTRIAXone, 2,000 mg, Intravenous, Q24H  empagliflozin, 25 mg, Oral, Daily  furosemide, 20 mg, Oral, Daily  glipizide, 5 mg, Oral, QAM AC  insulin glargine, 10 Units, Subcutaneous, Nightly  insulin lispro, 3-14 Units, Subcutaneous, 4x Daily AC & at Bedtime  ipratropium-albuterol, 3 mL, Nebulization, Q6H While Awake - RT  sodium chloride, 10 mL, Intravenous, Q12H      Continuous Infusions:     Vital signs in last 24 hours:  Temp:  [97 °F (36.1 °C)-98.1 °F (36.7 °C)] 97 °F (36.1 °C)  Heart Rate:  [] 76  Resp:  [18] 18  BP: (112-137)/(71-83) 112/75    Intake/Output:    Intake/Output Summary (Last 24 hours) at 2025 0931  Last data filed at 2025 0857  Gross per 24 hour   Intake 820 ml   Output --   Net 820 ml       Exam:  /75 (BP Location: Right arm, Patient Position: Lying)   Pulse 76   Temp 97 °F (36.1 °C) (Oral)   Resp 18   Ht 170.2 cm (67\")   Wt 92.5 kg (204 lb)   SpO2 94%   BMI 31.95 kg/m²   Patient is examined using the personal protective equipment as per guidelines from infection control for this particular patient as enacted.  Hand washing was performed before and after patient interaction.  General Appearance:    Alert, cooperative, no distress, AAOx3                          Head:    Normocephalic, without obvious abnormality, atraumatic                           Eyes:    PERRL, conjunctivae/corneas clear, EOM's intact, both eyes                         Throat:   Lips, tongue, gums normal; oral mucosa pink and moist    "                        Neck:   Supple, symmetrical, trachea midline, no JVD                         Lungs:    Clear to auscultation bilaterally, respirations unlabored                 Chest Wall:    No tenderness or deformity                          Heart:  S1-S2 regular                  Abdomen:   Soft nontender                 Extremities: Left knee and lower extremities wrapped in the Ace wrap.                        Pulses:   Pulses palpable in all extremities                            Skin:   Skin is warm and dry,  no rashes or palpable lesions                  Neurologic: Alert and oriented x 3       Data Review:    I reviewed the patient's new clinical results.  Results from last 7 days   Lab Units 02/22/25  0344 02/19/25  0442   WBC 10*3/mm3 13.56* 13.74*   HEMOGLOBIN g/dL 11.2* 11.2*   PLATELETS 10*3/mm3 545* 601*     Results from last 7 days   Lab Units 02/23/25  0815 02/22/25  0344 02/21/25  1418 02/19/25  0442   SODIUM mmol/L 138 128* 134* 138   POTASSIUM mmol/L 3.7 3.8 4.4 4.5   CHLORIDE mmol/L 100 92* 94* 100   CO2 mmol/L 24.6 22.0 23.1 24.9   BUN mg/dL 55* 64* 60* 74*   CREATININE mg/dL 1.22* 1.24* 1.45* 1.53*   CALCIUM mg/dL 9.0 8.9 8.8 9.5   GLUCOSE mg/dL 108* 135* 331* 158*     Microbiology Results (last 10 days)       ** No results found for the last 240 hours. **              Assessment:    Bacterial pneumonia    Sepsis, unspecified organism    Influenza A    Acute pain of left knee    Type 2 diabetes mellitus with hyperglycemia, with long-term current use of insulin    CKD (chronic kidney disease) stage 3, GFR 30-59 ml/min    Acute respiratory failure with hypoxia    74-year-old female with  1-painful tender left knee with decline in function status post joint aspiration followed by I&D and washout in the setting of respiratory tract infection with influenza A and abnormal urinalysis consistent with UTI-this presentation of tender painful knee with worsening function and abnormal joint fluid  analysis with crystal for uric acid positive could very well be due to combination of multiple pathologies happening together:             -Acute gouty arthritis with             -Secondary septic arthritis due to hematogenous seeding from the lung or urine versus             -Progression of osteoarthritis.  2-acute influenza A with secondary bacterial pneumonia  3-abnormal urinalysis with urine culture without any specific urinary symptoms  4-acute on chronic renal insufficiency with prior history of left-sided hydronephrosis and history of right nephrectomy  5-osteoarthritis of the right shoulder and wrist without any fracture  6-hypertension  7-peripheral neuropathy  8-history of renal cell carcinoma-history of right nephrectomy  9-type 2 diabetes.     Recommendations/Discussions:  See my discussion and recommendations on 2/13/2025 and 2/14/2025.  Management of concomitant gout with short course of oral steroid per primary team and orthopedic surgery service.  Patient will need out of hospital follow-up with orthopedic surgery service as per their recommendations  See discussion in the additional progress note on 2/14/2025 for alternate oral antibiotic therapy as a second line option which is better than taking no treatment as she is still very reluctant for IV treatment as recommended:  Patient is very concerned about her ability to get IV antibiotics and was hoping that she would get oral antibiotics for her infection.  I explained the patient about ideal treatment for this situation as recommended in my progress note and orders for the case management.  If patient after being explained the risks and benefits of her decision making after using oral antibiotics over IV antibiotics makes the decision to go with oral antibiotics then she could be switched to oral Keflex 500 mg every 8 hours for 28 days from 2/11/2025.  With ongoing continued management of gout  Patient will need close follow-up with the primary  care provider with once weekly lab work to monitor antibiotic toxicity and side effects with instructions to reach out to Dr. Duncan on a once a week basis to go over review of system to monitor antibiotic toxicity and side effects and have a primary care provider reach out to me if there is need for discussion of further management of her left knee infection.  Ideal treatment for this presentation is discussed will be 4 weeks of IV Rocephin from last surgical intervention on 2/11/2025.  Following orders are written for case management:   Please arrange for 4 weeks of IV Rocephin at 2 g 24 hours starting 2/11/2025.  Check weekly CBC CMP and CRP and call abnormal results at [phone].  Remove midline/PICC line after completion of antibiotic therapy  Please educate patient to reach out to Dr. Duncan on once a week basis at [phone] to go over review of system to monitor antibiotic toxicity and effectiveness of treatment.  Diagnosis:  1-painful tender left knee with decline in function status post joint aspiration followed by I&D and washout in the setting of respiratory tract infection with influenza A and abnormal urinalysis consistent with UTI-this presentation of tender painful knee with worsening function and abnormal joint fluid analysis with crystal for uric acid positive could very well be due to combination of multiple pathologies happening together:             -Acute gouty arthritis with             -Secondary septic arthritis due to hematogenous seeding from the lung or urine versus             -Progression of osteoarthritis.  2-acute influenza A with secondary bacterial pneumonia  3-abnormal urinalysis with urine culture without any specific urinary symptoms  Roxanne Duncan MD  2/25/2025  09:31 EST    Parts of this note may be an electronic transcription/translation of spoken language to printed text using the Dragon dictation system.

## 2025-02-25 NOTE — PLAN OF CARE
Goal Outcome Evaluation:  Plan of Care Reviewed With: patient           Outcome Evaluation: Upon entering room, pt. supine in bed, awake/alert, and agreeable to work with P.T. this date despite c/o fatigue and Left knee pain (3/10).  This AM, pt. able to ambulate 20 feet, CGA x 1, with use of Rwx.  Pt. requires CGA x 1 for bed mobility and CGA x 1 for sit <-> stand transfers. Left TKR ther. ex. program x 10 reps completed for general strengthening. Tactile cueing given during ambulation for posture correction.  Will continue to progress functional mobility as tolerated.

## 2025-02-25 NOTE — DISCHARGE SUMMARY
Date of Admission: 2/10/2025  Date of Discharge:  2/25/2025  Primary Care Physician: Jai Steven MD     Discharge Diagnosis:  Active Hospital Problems    Diagnosis  POA    **Bacterial pneumonia [J15.9]  Yes    Sepsis, unspecified organism [A41.9]  Yes    Influenza A [J10.1]  Yes    Acute pain of left knee [M25.562]  Yes    Type 2 diabetes mellitus with hyperglycemia, with long-term current use of insulin [E11.65, Z79.4]  Not Applicable    CKD (chronic kidney disease) stage 3, GFR 30-59 ml/min [N18.30]  Yes    Acute respiratory failure with hypoxia [J96.01]  Yes      Resolved Hospital Problems   No resolved problems to display.       DETAILS OF HOSPITAL STAY     Pertinent Test Results and Procedures Performed    X-ray of the bilateral knees showed moderately severe degenerative arthritis but no fractures    Chest x-ray showed mild atelectasis of the right lung base    X-ray of the right shoulder and forearm:  1. No acute appearing fracture. Suspect an old distal radius fracture.   2. Decreased acromiohumeral interval suggestive of underlying rotator   cuff tendinopathy or tear.   3. Degenerative AC joint.   4. Moderate first carpal metacarpal joint osteoarthritis     Liver ultrasound was unremarkable    Hospital Course  This is a 74-year-old female who presented to the emergency room complaining of weakness of the lower extremities and inability to walk.  EMS was called and she was found to be hypotensive and hypoxic.  She had recent flu infection.  Initial workup was consistent with secondary bacterial pneumonia for which she was placed on antibiotics.  Infectious disease followed her.  Also complained of pain in the left knee.  There was suspicion for septic arthritis.  Orthopedic surgery was consulted.  She underwent debridement and washout in the operating room.  ID has recommended a 4-week course of antibiotics with Rocephin.  She also received steroids for possible gout of the knee as well.  She  has completed a taper at this point.  Gastroenterology evaluated her for transaminitis.  They suspected this was related to muscle injury.  No specific treatment was required.  Liver ultrasound was negative.  LFTs have improved on their own.  She was followed by nephrology for SONI on chronic kidney disease stage III.  Her renal function has improved and now at baseline.  She also had some hyponatremia that is resolved.  Patient has been approved for rehab.  She is medically stable.  PICC line will be placed (approved by nephrology) and she will be released.  See below for final ID recommendations.  She will be discharged today.    Final ID recommendations:  -4 weeks of IV Rocephin at 2 g 24 hours starting 2/11/2025.  -Check weekly CBC CMP and CRP and call abnormal results at [phone].  -Remove midline/PICC line immediately after completion of antibiotic therapy  -Please educate patient to reach out to Dr. Duncan on once a week basis at [phone] to go over review of system to monitor antibiotic toxicity and effectiveness of treatment.    Physical Exam at Discharge:  General: No acute distress, AAOx3  HEENT: EOMI, PERRL  Cardiovascular: +s1 and s2, RRR  Lungs: No rhonchi or wheezing  Abdomen: soft, nontender    Consults:   Consults       Date and Time Order Name Status Description    2/14/2025  9:47 AM Inpatient Gastroenterology Consult      2/12/2025  6:24 PM Inpatient Nephrology Consult Completed     2/12/2025 12:25 PM Inpatient Infectious Diseases Consult Completed     2/10/2025 11:08 AM Inpatient Orthopedic Surgery Consult      2/10/2025  5:05 AM [facility] (on-call MD unless specified) Details                Condition on Discharge: Stable, improved    Discharge Disposition  Skilled Nursing Facility (DC - External)    Discharge Medications     Discharge Medications        New Medications        Instructions Start Date   cefTRIAXone 2,000 mg in sodium chloride 0.9 % 100 mL IVPB   2,000 mg, Intravenous, Every 24  Hours      empagliflozin 25 MG tablet tablet  Commonly known as: JARDIANCE  Replaces: Farxiga 10 MG tablet   25 mg, Oral, Daily             Changes to Medications        Instructions Start Date   acetaminophen 325 MG tablet  Commonly known as: TYLENOL  What changed:   medication strength  how much to take   650 mg, Oral, Every 6 Hours PRN      carvedilol 12.5 MG tablet  Commonly known as: COREG  What changed:   medication strength  how much to take  when to take this   12.5 mg, Oral, 2 Times Daily With Meals             Continue These Medications        Instructions Start Date   aspirin 81 MG EC tablet   81 mg, Daily      furosemide 20 MG tablet  Commonly known as: LASIX   20 mg, Daily      glimepiride 2 MG tablet  Commonly known as: AMARYL   2 mg      insulin glargine 100 UNIT/ML injection  Commonly known as: LANTUS, SEMGLEE   10 Units, Daily      methocarbamol 500 MG tablet  Commonly known as: ROBAXIN   500 mg, Oral, 4 Times Daily PRN      multivitamin with minerals tablet tablet   1 tablet, Oral, Daily      OSTEO BI-FLEX ADV DOUBLE ST PO   2 tablets, Daily             Stop These Medications      amLODIPine-benazepril 10-40 MG per capsule  Commonly known as: LOTREL     Farxiga 10 MG tablet  Generic drug: dapagliflozin Propanediol  Replaced by: empagliflozin 25 MG tablet tablet     FISH OIL CONCENTRATE PO     Garlic 400 MG tablet delayed-release     hydroCHLOROthiazide 25 MG tablet     HYDROcodone-acetaminophen 5-325 MG per tablet  Commonly known as: NORCO     Lokelma 5 g packet  Generic drug: sodium zirconium cyclosilicate     metFORMIN 500 MG tablet  Commonly known as: GLUCOPHAGE     NIACIN PO     rosuvastatin 10 MG tablet  Commonly known as: CRESTOR     simvastatin 20 MG tablet  Commonly known as: ZOCOR     sodium bicarbonate 650 MG tablet              Discharge Diet:   Diet Instructions       Diet: Regular/House Diet, Diabetic Diets; Consistent Carbohydrate; Regular (IDDSI 7); Thin (IDDSI 0)      Discharge  Diet:  Regular/House Diet  Diabetic Diets       Diabetic Diet: Consistent Carbohydrate    Texture: Regular (IDDSI 7)    Fluid Consistency: Thin (IDDSI 0)            Activity at Discharge:   Activity Instructions       Activity as Tolerated              Follow-up Appointments  No future appointments.  Additional Instructions for the Follow-ups that You Need to Schedule       Discharge Follow-up with PCP   As directed       Currently Documented PCP:    Jai Steven MD    PCP Phone Number:    [phone]     Follow Up Details: after rehab                  I have examined and discussed discharge planning with the patient today.    Silvestre Pierre MD  02/25/25  11:00 EST    Time: Discharge greater than 30 min

## 2025-02-25 NOTE — CASE MANAGEMENT/SOCIAL WORK
Continued Stay Note  [facility]     Patient Name: Chrissy Gutierrez  MRN: [id number]  Today's Date: 2/25/2025    Admit Date: 2/10/2025    Plan: [facility]- pre-cert obtained   Discharge Plan       Row Name 02/25/25 1017       Plan    Plan Comments DC order in EPIC. Transfer packet in cubbie; awaiting dc summary. [facility] w[facility]c kin arranged for 1400. Patient and son updated and agreeable. Spoke with Alyson/[facility] who confirms they can accept today. Will need midline placed to dc.                   Discharge Codes    No documentation.                 Expected Discharge Date and Time       Expected Discharge Date Expected Discharge Time    Feb 25, 2025               Elida Hayes RN

## 2025-02-25 NOTE — PROGRESS NOTES
"   LOS: 15 days     Chief Complaint/ Reason for encounter: CKD management    Subjective   02/14/25 : Patient is doing well today with no new complaints  Good appetite with no nausea or vomiting  No shortness of breath chest pain or edema  Voiding well with no dysuria  Denies new complaints    2/20: No new complaints, just waiting on rehab placement at this time  Needs IV access for outpatient antibiotics    2/24: She is doing well today denies complaints awaiting rehab placement  No shortness of breath or chest pain no fevers chills or edema    2/25: Fluid restriction was stopped yesterday at patient request.  She looks and feels well today denies complaints  No shortness of breath chest pain fevers chills nausea or vomiting    Medical history reviewed:  History of Present Illness    Subjective    History taken from: Chart and patient/family as able    Vital Signs  Temp:  [97 °F (36.1 °C)-98.1 °F (36.7 °C)] 97 °F (36.1 °C)  Heart Rate:  [] 76  Resp:  [18] 18  BP: (112-137)/(71-83) 112/75       Wt Readings from Last 1 Encounters:   02/10/25 0330 92.5 kg (204 lb)       Objective:  Vital signs: (most recent): Blood pressure 112/75, pulse 76, temperature 97 °F (36.1 °C), temperature source Oral, resp. rate 18, height 170.2 cm (67\"), weight 92.5 kg (204 lb), SpO2 94%.                Objective:  General Appearance:  Comfortable, obese, well-appearing, in no acute distress and not in pain.  HEENT: Mucous membranes moist  Lungs:  Normal effort and normal respiratory rate.  Breath sounds clear to auscultation: No rhonchi/Rales.  No  respiratory distress.   Heart:  S1, S2 normal.  No murmur.   Abdomen: Abdomen is soft, nontender/nondistended, + bowel sounds  Extremities: Trace edema of bilateral lower extremities  Skin:  Warm and dry with no rashes      Results Review:    Intake/Output:     Intake/Output Summary (Last 24 hours) at 2/25/2025 1059  Last data filed at 2/25/2025 0900  Gross per 24 hour   Intake 1180 ml "   Output --   Net 1180 ml         DATA:  Radiology and Labs:  The following labs independently reviewed by me. Additional labs ordered for tomorrow a.m.  Interval notes, chart personally reviewed by me.   Old records independently reviewed showing CKD 3  Discussed with patient    Risk/ complexity of medical care/ medical decision making moderate, diuretic management with CKD      Labs:   Recent Results (from the past 24 hours)   POC Glucose Once    Collection Time: 02/24/25 11:37 AM    Specimen: Blood   Result Value Ref Range    Glucose 244 (H) 70 - 130 mg/dL   POC Glucose Once    Collection Time: 02/24/25  4:06 PM    Specimen: Blood   Result Value Ref Range    Glucose 296 (H) 70 - 130 mg/dL   POC Glucose Once    Collection Time: 02/24/25  8:34 PM    Specimen: Blood   Result Value Ref Range    Glucose 285 (H) 70 - 130 mg/dL   POC Glucose Once    Collection Time: 02/25/25  7:10 AM    Specimen: Blood   Result Value Ref Range    Glucose 103 70 - 130 mg/dL   POC Glucose Once    Collection Time: 02/25/25 10:56 AM    Specimen: Blood   Result Value Ref Range    Glucose 208 (H) 70 - 130 mg/dL       Radiology:  Pertinent radiology studies were reviewed as described above      Medications have been reviewed separately in chart review medication tab      ASSESSMENT:  CKD stage IIIa, stable, near baseline    Bacterial pneumonia, remains on IV/p.o. antibiotics    Sepsis, unspecified organism, improving with antibiotic therapy    Influenza A  Chronic diastolic CHF, on oral diuretics     Type 2 diabetes mellitus with hyperglycemia, with long-term current use of insulin    Acute respiratory failure with hypoxia, improved, on room air           DISCUSSION/PLAN:   No recent labs but her creatinine level has been fairly stable,, below usual baseline creatinine of around 1.4-1.8  Clinically euvolemic on exam  Remains euvolemic on current oral Lasix dose: Continue 20 mg daily dose today and at discharge  Fluid restriction was  lifted  Recommend repeat chemistries in 1 week at [facility]  Antibiotics per infectious disease, dosed for GFR around 50  Acidosis improved off sodium bicarb  Continue Jardiance  Discharge planning: Okay from renal standpoint anytime      Antwan Vieira MD  [facility]   Office phone number: [phone]  Answering service phone number: [phone]    02/25/25  10:59 EST    Dictation performed using Dragon dictation software

## 2025-02-26 NOTE — PROGRESS NOTES
Enter Query Response Below      Query Response: Other - irrigation and debridement of the knee joint with open arthrotomy using scalpel and bovie and use of normal saline.              If applicable, please update the problem list.

## 2025-03-02 NOTE — PROGRESS NOTES
Enter Query Response Below      Query Response: Severe sepsis causing Acute hypoxic respiratory failure and SONI              If applicable, please update the problem list.

## 2025-03-02 NOTE — PROGRESS NOTES
Enter Query Response Below      Query Response:   - Sepsis due to E-coli Urinary tract infection              If applicable, please update the problem list.

## (undated) DEVICE — SYR LUERLOK 30CC

## (undated) DEVICE — PENCL SMOKE/EVAC MEGADYNE TELESCP 10FT

## (undated) DEVICE — DRSNG WND GZ CURAD OIL EMULSION 3X8IN LF STRL 1PK

## (undated) DEVICE — GOWN,REINF,POLY,SIRUS,BRTH SLV,XLNG/XXL: Brand: MEDLINE

## (undated) DEVICE — SUT VIC 2/0 CT2 27IN J269H

## (undated) DEVICE — DRP MICROSCP LEICA W/GLASS LENS

## (undated) DEVICE — NEEDLE, QUINCKE, 18GX3.5": Brand: MEDLINE

## (undated) DEVICE — NDL HYPO ECLPS SFTY 22G 1 1/2IN

## (undated) DEVICE — SUT MNCRYL 3/0 PS2 18IN MCP497G

## (undated) DEVICE — UNDERCAST PADDING: Brand: DEROYAL

## (undated) DEVICE — 3.0MM NEURO (MATCH HEAD) LESS AGGRESSIVE

## (undated) DEVICE — TRAP FLD MINIVAC MEGADYNE 100ML

## (undated) DEVICE — BNDG,ELSTC,MATRIX,STRL,6"X5YD,LF,HOOK&LP: Brand: MEDLINE

## (undated) DEVICE — CONN TBG Y 5 IN 1 LF STRL

## (undated) DEVICE — APPL CHLORAPREP HI/LITE 26ML ORNG

## (undated) DEVICE — CULT AER/ANAEROB FASTIDIOUS BACT

## (undated) DEVICE — TOWEL,OR,DSP,ST,BLUE,STD,4/PK,20PK/CS: Brand: MEDLINE

## (undated) DEVICE — DRSNG WND BORDR/ADHS NONADHR/GZ LF 2X2IN STRL

## (undated) DEVICE — EMERALD ARTHROSCOPY SHEET: Brand: CONVERTORS

## (undated) DEVICE — PK NEURO SPINE 40

## (undated) DEVICE — TUBING, SUCTION, 1/4" X 20', STRAIGHT: Brand: MEDLINE INDUSTRIES, INC.

## (undated) DEVICE — GLV SURG TRIUMPH CLASSIC PF LTX 8 STRL

## (undated) DEVICE — FLOSEAL MATRIX IS INDICATED IN SURGICAL PROCEDURES (OTHER THAN IN OPHTHALMIC) AS AN ADJUNCT TO HEMOSTASIS WHEN CONTROL OF BLEEDING BY LIGATURE OR CONVENTIONAL PROCEDURES IS INEFFECTIVE OR IMPRACTICAL.: Brand: FLOSEAL HEMOSTATIC MATRIX

## (undated) DEVICE — SUT MNCRYL 2/0 CT1 36IN UD MCP945H

## (undated) DEVICE — CODMAN® SURGICAL PATTIES 3/4" X 3/4" (1.91CM X 1.91CM): Brand: CODMAN®

## (undated) DEVICE — APPL DURAPREP IODOPHOR APL 26ML

## (undated) DEVICE — SUT VIC 0/0 UR6 27IN DYED J603H

## (undated) DEVICE — GLV SURG SENSICARE PI PF LF 8.5 GRN STRL

## (undated) DEVICE — STPLR SKIN VISISTAT WD 35CT

## (undated) DEVICE — ELECTRD BLD EXT EDGE 1P COAT 6.5IN STRL

## (undated) DEVICE — GLV SURG BIOGEL LTX PF 8

## (undated) DEVICE — PK ORTHO MAJ 40

## (undated) DEVICE — GLV SURG PREMIERPRO ORTHO LTX PF SZ8.5 BRN

## (undated) DEVICE — COVER,MAYO STAND,STERILE: Brand: MEDLINE

## (undated) DEVICE — ADHS SKIN DERMABOND TOP ADVANCED

## (undated) DEVICE — MAT FLR ABSORBENT LG 4FT 10 2.5FT

## (undated) DEVICE — GLV SURG SIGNATURE ESSENTIAL PF LTX SZ8.5

## (undated) DEVICE — SMOKE EVACUATION TUBING WITH 7/8 IN TO 1/4 IN REDUCER: Brand: BUFFALO FILTER

## (undated) DEVICE — T-DRAPE,EXTREMITY,STERILE: Brand: MEDLINE

## (undated) DEVICE — NDL SPINE 22G 31/2IN BLK

## (undated) DEVICE — 6.0MM PRECISION ROUND

## (undated) DEVICE — SUT PDS 1 CT1 36IN Z347H

## (undated) DEVICE — SET,IRRIGATION,CYSTO/TUR,90": Brand: MEDLINE

## (undated) DEVICE — CONTAINER,SPECIMEN,OR STERILE,4OZ: Brand: MEDLINE